# Patient Record
Sex: MALE | Race: BLACK OR AFRICAN AMERICAN | NOT HISPANIC OR LATINO | Employment: FULL TIME | URBAN - METROPOLITAN AREA
[De-identification: names, ages, dates, MRNs, and addresses within clinical notes are randomized per-mention and may not be internally consistent; named-entity substitution may affect disease eponyms.]

---

## 2017-03-06 DIAGNOSIS — J45.41 MODERATE PERSISTENT ASTHMA WITH EXACERBATION: ICD-10-CM

## 2017-03-06 RX ORDER — BUDESONIDE AND FORMOTEROL FUMARATE DIHYDRATE 160; 4.5 UG/1; UG/1
2 AEROSOL RESPIRATORY (INHALATION) 2 TIMES DAILY
Qty: 3 INHALER | Refills: 0 | Status: SHIPPED | OUTPATIENT
Start: 2017-03-06 | End: 2017-06-23

## 2017-03-06 NOTE — TELEPHONE ENCOUNTER
Prescription approved per [facility] Refill Protocol.  Ashley Vitale PharmD   [facility]  [email or web address]   Float pharmacist on behalf of [facility].

## 2017-03-14 DIAGNOSIS — E78.5 HYPERLIPIDEMIA LDL GOAL <100: ICD-10-CM

## 2017-03-14 DIAGNOSIS — I10 ESSENTIAL HYPERTENSION: ICD-10-CM

## 2017-03-14 DIAGNOSIS — E11.9 TYPE 2 DIABETES MELLITUS WITHOUT COMPLICATION (H): ICD-10-CM

## 2017-03-14 RX ORDER — PRAVASTATIN SODIUM 20 MG
20 TABLET ORAL DAILY
Qty: 90 TABLET | Refills: 1 | Status: SHIPPED | OUTPATIENT
Start: 2017-03-14 | End: 2017-06-23

## 2017-03-14 RX ORDER — NIFEDIPINE 30 MG/1
30 TABLET, EXTENDED RELEASE ORAL DAILY
Qty: 90 TABLET | Refills: 1 | Status: SHIPPED | OUTPATIENT
Start: 2017-03-14 | End: 2017-06-23

## 2017-04-18 DIAGNOSIS — I10 ESSENTIAL HYPERTENSION: ICD-10-CM

## 2017-04-18 NOTE — TELEPHONE ENCOUNTER
losartan (COZAAR) 100 MG tablet      Last Written Prescription Date: 8/24/16  Last Fill Quantity: 90, # refills: 3  Last Office Visit with G, P or [facility] prescribing provider: 10/26/16       Potassium   Date Value Ref Range Status   03/03/2016 4.1 3.4 - 5.3 mmol/L Final     Creatinine   Date Value Ref Range Status   03/03/2016 0.92 0.66 - 1.25 mg/dL Final     BP Readings from Last 3 Encounters:   10/26/16 125/87   02/24/16 122/86   01/07/16 (!) 147/99

## 2017-04-21 RX ORDER — LOSARTAN POTASSIUM 100 MG/1
TABLET ORAL
Qty: 90 TABLET | Refills: 1 | Status: SHIPPED | OUTPATIENT
Start: 2017-04-21 | End: 2017-06-23

## 2017-04-25 DIAGNOSIS — K21.9 GASTROESOPHAGEAL REFLUX DISEASE WITHOUT ESOPHAGITIS: ICD-10-CM

## 2017-04-26 NOTE — TELEPHONE ENCOUNTER
Omeprazole      Last Written Prescription Date: 2/24/2016  Last Fill Quantity: 90,  # refills: 3   Last Office Visit with G, [facility] or [facility] prescribing provider: 10/26/2016

## 2017-05-31 ENCOUNTER — TELEPHONE (OUTPATIENT)
Dept: FAMILY MEDICINE | Facility: CLINIC | Age: 47
End: 2017-05-31

## 2017-06-01 ASSESSMENT — ASTHMA QUESTIONNAIRES: ACT_TOTALSCORE: 15

## 2017-06-18 DIAGNOSIS — I10 ESSENTIAL HYPERTENSION: ICD-10-CM

## 2017-06-18 DIAGNOSIS — J45.40 MODERATE PERSISTENT ASTHMA WITHOUT COMPLICATION: ICD-10-CM

## 2017-06-18 RX ORDER — CHLORTHALIDONE 25 MG/1
TABLET ORAL
Qty: 90 TABLET | Refills: 3 | Status: CANCELLED | OUTPATIENT
Start: 2017-06-18

## 2017-06-18 RX ORDER — ALBUTEROL SULFATE 90 UG/1
AEROSOL, METERED RESPIRATORY (INHALATION)
Qty: 18 G | Refills: 0 | Status: CANCELLED | OUTPATIENT
Start: 2017-06-18

## 2017-06-19 NOTE — TELEPHONE ENCOUNTER
Ventolin       Last Written Prescription Date: 10/26/2016  Last Fill Quantity: 1 inhaler, # refills: 11    Last Office Visit with [facility], [facility] or  Health prescribing provider:  10/26/2016   Future Office Visit:    Next 5 appointments (look out 90 days)     Jun 27, 2017  8:40 AM CDT   [facility]man [facility]   [facility]    [address] [address]   [phone]                   Date of Last Asthma Action Plan Letter:   Asthma Action Plan Q1 Year    Topic Date Due     Asthma Action Plan - yearly  10/26/2017      Asthma Control Test:   ACT Total Scores 5/31/2017   ACT TOTAL SCORE (Goal Greater than or Equal to 20) 15   In the past 12 months, how many times did you visit the emergency room for your asthma without being admitted to the hospital? 0   In the past 12 months, how many times were you hospitalized overnight because of your asthma? 0       Date of Last Spirometry Test:   No results found for this or any previous visit.      Chlorthalidone      Last Written Prescription Date: 02/24/2016  Last Fill Quantity: 90, # refills: 3  Last Office Visit with [facility], [facility] or  Health prescribing provider: 10/26/2016  Next 5 appointments (look out 90 days)     Jun 27, 2017  8:40 AM CDT   SHORT with Deann Gilman NP   [facility]    [address] [address]   [phone]                   Potassium   Date Value Ref Range Status   03/03/2016 4.1 3.4 - 5.3 mmol/L Final     Creatinine   Date Value Ref Range Status   03/03/2016 0.92 0.66 - 1.25 mg/dL Final     BP Readings from Last 3 Encounters:   10/26/16 125/87   02/24/16 122/86   01/07/16 (!) 147/99       Klever CERNA (R)

## 2017-06-23 ENCOUNTER — OFFICE VISIT (OUTPATIENT)
Dept: FAMILY MEDICINE | Facility: CLINIC | Age: 47
End: 2017-06-23
Payer: COMMERCIAL

## 2017-06-23 VITALS
DIASTOLIC BLOOD PRESSURE: 92 MMHG | OXYGEN SATURATION: 96 % | WEIGHT: 229 LBS | HEART RATE: 103 BPM | BODY MASS INDEX: 28.47 KG/M2 | SYSTOLIC BLOOD PRESSURE: 133 MMHG | TEMPERATURE: 98.6 F | HEIGHT: 75 IN

## 2017-06-23 DIAGNOSIS — E78.5 HYPERLIPIDEMIA LDL GOAL <100: ICD-10-CM

## 2017-06-23 DIAGNOSIS — I10 BENIGN ESSENTIAL HYPERTENSION: ICD-10-CM

## 2017-06-23 DIAGNOSIS — J45.40 MODERATE PERSISTENT ASTHMA WITHOUT COMPLICATION: ICD-10-CM

## 2017-06-23 DIAGNOSIS — E11.9 TYPE 2 DIABETES MELLITUS WITHOUT COMPLICATION, WITHOUT LONG-TERM CURRENT USE OF INSULIN (H): Primary | ICD-10-CM

## 2017-06-23 PROCEDURE — 99214 OFFICE O/P EST MOD 30 MIN: CPT | Performed by: INTERNAL MEDICINE

## 2017-06-23 RX ORDER — NIFEDIPINE 30 MG/1
30 TABLET, EXTENDED RELEASE ORAL DAILY
Qty: 90 TABLET | Refills: 3 | Status: SHIPPED | OUTPATIENT
Start: 2017-06-23 | End: 2018-07-31

## 2017-06-23 RX ORDER — CHLORTHALIDONE 25 MG/1
25 TABLET ORAL DAILY
Qty: 90 TABLET | Refills: 3 | Status: SHIPPED | OUTPATIENT
Start: 2017-06-23 | End: 2017-09-28

## 2017-06-23 RX ORDER — LOSARTAN POTASSIUM 100 MG/1
100 TABLET ORAL DAILY
Qty: 90 TABLET | Refills: 3 | Status: SHIPPED | OUTPATIENT
Start: 2017-06-23 | End: 2018-07-31

## 2017-06-23 RX ORDER — BUDESONIDE AND FORMOTEROL FUMARATE DIHYDRATE 160; 4.5 UG/1; UG/1
2 AEROSOL RESPIRATORY (INHALATION) 2 TIMES DAILY
Qty: 1 INHALER | Refills: 11 | Status: SHIPPED | OUTPATIENT
Start: 2017-06-23 | End: 2018-07-21

## 2017-06-23 RX ORDER — PRAVASTATIN SODIUM 20 MG
20 TABLET ORAL DAILY
Qty: 90 TABLET | Refills: 3 | Status: SHIPPED | OUTPATIENT
Start: 2017-06-23 | End: 2018-07-31

## 2017-06-23 RX ORDER — ALBUTEROL SULFATE 90 UG/1
2 AEROSOL, METERED RESPIRATORY (INHALATION) EVERY 4 HOURS PRN
Qty: 1 INHALER | Refills: 11 | Status: SHIPPED | OUTPATIENT
Start: 2017-06-23 | End: 2018-01-14

## 2017-06-23 NOTE — NURSING NOTE
"Chief Complaint   Patient presents with     Asthma     Diabetes       Initial BP (!) 133/92 (BP Location: Right arm, Patient Position: Chair, Cuff Size: Adult Large)  Pulse 103  Temp 98.6  F (37  C) (Tympanic)  Ht 6' 3\" (1.905 m)  Wt 229 lb (103.9 kg)  SpO2 96%  BMI 28.62 kg/m2 Estimated body mass index is 28.62 kg/(m^2) as calculated from the following:    Height as of this encounter: 6' 3\" (1.905 m).    Weight as of this encounter: 229 lb (103.9 kg).  Medication Reconciliation: complete   Tiffany CAABLLERO CMA (AAMA)    "

## 2017-06-23 NOTE — MR AVS SNAPSHOT
After Visit Summary   6/23/2017    Juan Pablo Abraham    MRN: [id number]           Patient Information     Date Of Birth          1970        Visit Information        Provider Department      6/23/2017 7:00 AM John Langford MD [facility]        Today's Diagnoses     Type 2 diabetes mellitus without complication, without long-term current use of insulin (H)    -  1    Benign essential hypertension        Hyperlipidemia LDL goal <100        Essential hypertension        Moderate persistent asthma with exacerbation        Moderate persistent asthma without complication          Care Instructions    I would recommend getting an eye exam done soon- this should be done yearly when people have diabetes.            Follow-ups after your visit        Future tests that were ordered for you today     Open Future Orders        Priority Expected Expires Ordered    Basic metabolic panel Routine  7/24/2017 6/23/2017            Who to contact     If you have questions or need follow up information about today's clinic visit or your schedule please contact [facility] directly at [phone].  Normal or non-critical lab and imaging results will be communicated to you by MyChart, letter or phone within 4 business days after the clinic has received the results. If you do not hear from us within 7 days, please contact the clinic through [employer] or phone. If you have a critical or abnormal lab result, we will notify you by phone as soon as possible.  Submit refill requests through [employer] or call your pharmacy and they will forward the refill request to us. Please allow 3 business days for your refill to be completed.          Additional Information About Your Visit        MyChart Information     [employer] gives you secure access to your electronic health record. If you see a primary care provider, you can also send messages to your care team and make appointments. If you have questions, please  "call your primary care clinic.  If you do not have a primary care provider, please call [phone] and they will assist you.        Care EveryWhere ID     This is your Care EveryWhere ID. This could be used by other organizations to access your [facility] medical records  [id number]        Your Vitals Were     Pulse Temperature Height Pulse Oximetry BMI (Body Mass Index)       103 98.6  F (37  C) (Tympanic) 6' 3\" (1.905 m) 96% 28.62 kg/m2        Blood Pressure from Last 3 Encounters:   06/23/17 (!) 133/92   10/26/16 125/87   02/24/16 122/86    Weight from Last 3 Encounters:   06/23/17 229 lb (103.9 kg)   10/26/16 234 lb (106.1 kg)   02/24/16 217 lb (98.4 kg)              We Performed the Following     Albumin Random Urine Quantitative     Hemoglobin A1c          Today's Medication Changes          These changes are accurate as of: 6/23/17  7:26 AM.  If you have any questions, ask your nurse or doctor.               These medicines have changed or have updated prescriptions.        Dose/Directions    losartan 100 MG tablet   Commonly known as:  COZAAR   This may have changed:  See the new instructions.   Used for:  Essential hypertension   Changed by:  John Langford MD        Dose:  100 mg   Take 1 tablet (100 mg) by mouth daily   Quantity:  90 tablet   Refills:  3         Stop taking these medicines if you haven't already. Please contact your care team if you have questions.     predniSONE 20 MG tablet   Commonly known as:  DELTASONE   Stopped by:  John Langford MD                Where to get your medicines      These medications were sent to [facility] [address]  [address] [address]     Phone:  [phone]     albuterol 108 (90 BASE) MCG/ACT Inhaler    budesonide-formoterol 160-4.5 MCG/ACT Inhaler    chlorthalidone 25 MG tablet    losartan 100 MG tablet    NIFEdipine ER osmotic 30 MG 24 hr tablet    pravastatin 20 MG tablet                Primary Care " Provider Office Phone # Fax #    Deann Gilman, EBONY [phone]       [facility] [address] [address]        Equal Access to Services     PERLA GUTIERREZ : Hadii aad ku hadoliviero Soomaali, waaxda luqadaha, qaybta kaalmada adeegyada, gilbert sharpn samiranish monroeflo zambrano. So [facility] [phone].    ATENCIÓN: Si habla español, tiene a lees disposición servicios gratuitos de asistencia lingüística. Llame al [phone].    We comply with applicable federal civil rights laws and [place] laws. We do not discriminate on the basis of race, color, national origin, age, disability sex, sexual orientation or gender identity.            Thank you!     Thank you for choosing [facility]  for your care. Our goal is always to provide you with excellent care. Hearing back from our patients is one way we can continue to improve our services. Please take a few minutes to complete the written survey that you may receive in the mail after your visit with us. Thank you!             Your Updated Medication List - Protect others around you: Learn how to safely use, store and throw away your medicines at www.disposemymeds.org.          This list is accurate as of: 6/23/17  7:26 AM.  Always use your most recent med list.                   Brand Name Dispense Instructions for use Diagnosis    * albuterol (2.5 MG/3ML) 0.083% neb solution     30 vial    Take 1 vial (2.5 mg) by nebulization every 6 hours as needed for shortness of breath / dyspnea or wheezing    Moderate persistent asthma without complication       * albuterol 108 (90 BASE) MCG/ACT Inhaler    VENTOLIN HFA    1 Inhaler    Inhale 2 puffs into the lungs every 4 hours as needed ntbs    Moderate persistent asthma without complication       ASPIRIN NOT PRESCRIBED    INTENTIONAL    1 each    continuous prn for other Antiplatelet medication not prescribed intentionally due to Not indicated based on age        blood glucose  monitoring lancets     100 each    Use 1 daily    Type 2 diabetes, HbA1c goal < 7% (H)       blood glucose monitoring test strip    FREESTYLE LITE    100 strip    Use to test blood sugars 2 times daily or as directed.    Type 2 diabetes, HbA1c goal < 7% (H)       budesonide-formoterol 160-4.5 MCG/ACT Inhaler    SYMBICORT    1 Inhaler    Inhale 2 puffs into the lungs 2 times daily    Moderate persistent asthma with exacerbation       chlorthalidone 25 MG tablet    HYGROTON    90 tablet    Take 1 tablet (25 mg) by mouth daily    Essential hypertension       losartan 100 MG tablet    COZAAR    90 tablet    Take 1 tablet (100 mg) by mouth daily    Essential hypertension       NIFEdipine ER osmotic 30 MG 24 hr tablet    PROCARDIA XL    90 tablet    Take 1 tablet (30 mg) by mouth daily    Essential hypertension       omeprazole 20 MG CR capsule    priLOSEC    90 capsule    TAKE ONE CAPSULE BY MOUTH EVERY DAY    Gastroesophageal reflux disease without esophagitis       pravastatin 20 MG tablet    PRAVACHOL    90 tablet    Take 1 tablet (20 mg) by mouth daily    Hyperlipidemia LDL goal <100       STATIN NOT PRESCRIBED (INTENTIONAL)     0 each    Statin not prescribed intentionally due to Intolerance (with supporting documentation of trying a statin at least once within the last 5 years)    Statin intolerance       * Notice:  This list has 2 medication(s) that are the same as other medications prescribed for you. Read the directions carefully, and ask your doctor or other care provider to review them with you.

## 2017-06-23 NOTE — LETTER
[facility]  [address] [address]  [phone]        October 4, 2018    Juan Pablo Abraham  [address] [address]              Dear Juan Pablo Abraham    This is to remind you that your Urinalysis is due.    You may call our office at [phone] to schedule an appointment.    Please disregard this notice if you have already had your labs drawn or made an appointment.        Sincerely,        John Langford MD

## 2017-06-23 NOTE — PROGRESS NOTES
SUBJECTIVE:                                                    Juan Pablo Abraham is a 47 year old male who presents to clinic today for the following health issues:    Chief Complaint   Patient presents with     Asthma     Diabetes         Diabetes Follow-up      Patient is checking blood sugars: not at all    Diabetic concerns: None     Symptoms of hypoglycemia (low blood sugar): none     Paresthesias (numbness or burning in feet) or sores: No     Date of last diabetic eye exam: never had one     Diabetes is diet controlled.      Hyperlipidemia Follow-Up      Rate your low fat/cholesterol diet?: not monitoring fat    Taking statin?  Yes, no muscle aches from statin    Other lipid medications/supplements?:  none     Hypertension Follow-up      Outpatient blood pressures are being checked at work.  Results are 130s/90s.    Low Salt Diet: not monitoring salt       Asthma Follow-Up    Was ACT completed today?    Yes    ACT Total Scores 6/23/2017   ACT TOTAL SCORE (Goal Greater than or Equal to 20) 19   In the past 12 months, how many times did you visit the emergency room for your asthma without being admitted to the hospital? 0   In the past 12 months, how many times were you hospitalized overnight because of your asthma? 0       Recent asthma triggers that patient is dealing with: None        Amount of exercise or physical activity: 2-3 days/week for an average of 30-45 minutes    Problems taking medications regularly: No    Medication side effects: none    Diet: regular (no restrictions) and tries to watch carbohydrates and sugar    Juan Pablo states that his asthma is at its baseline.  Triggers are cold weather, exercise, and dust.  He does not believe he has any problems with seasonal allergies, and does not routinely take antihistamine.  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   Occasional coughing and wheezing.        Problem list and histories reviewed & adjusted, as indicated.  Additional history: as  documented    Current Outpatient Prescriptions   Medication Sig Dispense Refill     pravastatin (PRAVACHOL) 20 MG tablet Take 1 tablet (20 mg) by mouth daily 90 tablet 3     chlorthalidone (HYGROTON) 25 MG tablet Take 1 tablet (25 mg) by mouth daily 90 tablet 3     budesonide-formoterol (SYMBICORT) 160-4.5 MCG/ACT Inhaler Inhale 2 puffs into the lungs 2 times daily 1 Inhaler 11     albuterol (VENTOLIN HFA) 108 (90 BASE) MCG/ACT Inhaler Inhale 2 puffs into the lungs every 4 hours as needed ntbs 1 Inhaler 11     NIFEdipine ER osmotic (PROCARDIA XL) 30 MG 24 hr tablet Take 1 tablet (30 mg) by mouth daily 90 tablet 3     losartan (COZAAR) 100 MG tablet Take 1 tablet (100 mg) by mouth daily 90 tablet 3     omeprazole (PRILOSEC) 20 MG CR capsule TAKE ONE CAPSULE BY MOUTH EVERY DAY 90 capsule 1     [DISCONTINUED] losartan (COZAAR) 100 MG tablet TAKE ONE TABLET BY MOUTH EVERY DAY 90 tablet 1     [DISCONTINUED] NIFEdipine ER osmotic (PROCARDIA XL) 30 MG 24 hr tablet Take 1 tablet (30 mg) by mouth daily 90 tablet 1     [DISCONTINUED] pravastatin (PRAVACHOL) 20 MG tablet Take 1 tablet (20 mg) by mouth daily 90 tablet 1     [DISCONTINUED] budesonide-formoterol (SYMBICORT) 160-4.5 MCG/ACT Inhaler Inhale 2 puffs into the lungs 2 times daily 3 Inhaler 0     albuterol (2.5 MG/3ML) 0.083% nebulizer solution Take 1 vial (2.5 mg) by nebulization every 6 hours as needed for shortness of breath / dyspnea or wheezing 30 vial 2     [DISCONTINUED] albuterol (VENTOLIN HFA) 108 (90 BASE) MCG/ACT inhaler Inhale 2 puffs into the lungs every 4 hours as needed ntbs 1 Inhaler 11     [DISCONTINUED] chlorthalidone (HYGROTON) 25 MG tablet Take 1 tablet (25 mg) by mouth daily 90 tablet 3     STATIN NOT PRESCRIBED, INTENTIONAL, Statin not prescribed intentionally due to Intolerance (with supporting documentation of trying a statin at least once within the last 5 years) 0 each 0     ASPIRIN NOT PRESCRIBED, INTENTIONAL, continuous prn for other  "Antiplatelet medication not prescribed intentionally due to Not indicated based on age 1 each 0     glucose blood VI test strips (FREESTYLE LITE) strip Use to test blood sugars 2 times daily or as directed. 100 strip 12     FREESTYLE LANCETS MISC Use 1 daily 100 each 12     No Known Allergies    Reviewed and updated as needed this visit by clinical staff  Tobacco  Allergies  Med Hx  Surg Hx  Fam Hx  Soc Hx      Reviewed and updated as needed this visit by Provider         ROS:  Constitutional, pulmonary systems are negative, except as otherwise noted.    OBJECTIVE:                                                    BP (!) 133/92 (BP Location: Right arm, Patient Position: Chair, Cuff Size: Adult Large)  Pulse 103  Temp 98.6  F (37  C) (Tympanic)  Ht 6' 3\" (1.905 m)  Wt 229 lb (103.9 kg)  SpO2 96%  BMI 28.62 kg/m2  Body mass index is 28.62 kg/(m^2).  GENERAL: healthy, alert and no distress  RESP: lungs clear to auscultation - no rales, rhonchi or wheezes  CV: regular rate and rhythm, normal S1 S2, no S3 or S4, no murmur, click or rub, no peripheral edema   Diabetic foot exam: normal DP and PT pulses, no trophic changes or ulcerative lesions and normal monofilament exam       ASSESSMENT/PLAN:                                                        1. Type 2 diabetes mellitus without complication, without long-term current use of insulin (H)    Diet controlled.  Due for A1C and microalbumin- ordered.  Reminded him to get annual eye exam.  Normal foot exam today.      - **A1C FUTURE anytime; Future  - **Albumin Random Urine Quant FUTURE anytime; Future    2. Benign essential hypertension    DBP slightly high, but SBP good, so will continue current meds.  Due for BMP- ordered.     - Basic metabolic panel; Future  - chlorthalidone (HYGROTON) 25 MG tablet; Take 1 tablet (25 mg) by mouth daily  Dispense: 90 tablet; Refill: 3  - NIFEdipine ER osmotic (PROCARDIA XL) 30 MG 24 hr tablet; Take 1 tablet (30 mg) by mouth " daily  Dispense: 90 tablet; Refill: 3  - losartan (COZAAR) 100 MG tablet; Take 1 tablet (100 mg) by mouth daily  Dispense: 90 tablet; Refill: 3    3. Hyperlipidemia LDL goal <100    Lipids checked last November, continue pravastatin.     - pravastatin (PRAVACHOL) 20 MG tablet; Take 1 tablet (20 mg) by mouth daily  Dispense: 90 tablet; Refill: 3    6. Moderate persistent asthma without complication    ACT score is 19, but he reports he feels at his baseline and is overall satisfied with the current control, so will not change meds.  Discussed getting updated PFTs since it has been awhile, but he says he knows it will show obstruction so he's not really interested in updating this at the current time.  Print out of asthma action plan provided.     - budesonide-formoterol (SYMBICORT) 160-4.5 MCG/ACT Inhaler; Inhale 2 puffs into the lungs 2 times daily  Dispense: 1 Inhaler; Refill: 11  - albuterol (VENTOLIN HFA) 108 (90 BASE) MCG/ACT Inhaler; Inhale 2 puffs into the lungs every 4 hours as needed ntbs  Dispense: 1 Inhaler; Refill: 11      John Langford MD  [facility]

## 2017-06-23 NOTE — PATIENT INSTRUCTIONS
I would recommend getting an eye exam done soon- this should be done yearly when people have diabetes.

## 2017-06-24 DIAGNOSIS — E11.9 TYPE 2 DIABETES MELLITUS WITHOUT COMPLICATION, WITHOUT LONG-TERM CURRENT USE OF INSULIN (H): ICD-10-CM

## 2017-06-24 DIAGNOSIS — I10 BENIGN ESSENTIAL HYPERTENSION: ICD-10-CM

## 2017-06-24 LAB
ANION GAP SERPL CALCULATED.3IONS-SCNC: 7 MMOL/L (ref 3–14)
BUN SERPL-MCNC: 16 MG/DL (ref 7–30)
CALCIUM SERPL-MCNC: 10 MG/DL (ref 8.5–10.1)
CHLORIDE SERPL-SCNC: 107 MMOL/L (ref 94–109)
CO2 SERPL-SCNC: 25 MMOL/L (ref 20–32)
CREAT SERPL-MCNC: 0.9 MG/DL (ref 0.66–1.25)
GFR SERPL CREATININE-BSD FRML MDRD: ABNORMAL ML/MIN/1.7M2
GLUCOSE SERPL-MCNC: 107 MG/DL (ref 70–99)
HBA1C MFR BLD: 6 % (ref 4.3–6)
POTASSIUM SERPL-SCNC: 3.4 MMOL/L (ref 3.4–5.3)
SODIUM SERPL-SCNC: 139 MMOL/L (ref 133–144)

## 2017-06-24 PROCEDURE — 36415 COLL VENOUS BLD VENIPUNCTURE: CPT | Performed by: INTERNAL MEDICINE

## 2017-06-24 PROCEDURE — 80048 BASIC METABOLIC PNL TOTAL CA: CPT | Performed by: INTERNAL MEDICINE

## 2017-06-24 PROCEDURE — 83036 HEMOGLOBIN GLYCOSYLATED A1C: CPT | Performed by: INTERNAL MEDICINE

## 2017-06-24 ASSESSMENT — ASTHMA QUESTIONNAIRES: ACT_TOTALSCORE: 19

## 2017-08-09 ENCOUNTER — TELEPHONE (OUTPATIENT)
Dept: FAMILY MEDICINE | Facility: CLINIC | Age: 47
End: 2017-08-09

## 2017-08-09 NOTE — TELEPHONE ENCOUNTER
Reviewing chart and noticed your blood pressure was above goal  BP Readings from Last 3 Encounters:   06/23/17 (!) 133/92   10/26/16 125/87   02/24/16 122/86     Can you come and have the RN check blood pressure some time over the next 1-2 weeks.    Ask patient is he is still smoking as well.  If so find out if he is interested in any resources or medications to help him quit smoking.  We also have MTM to help with this as well.  Place referral if patient interested.    Call patient with above.  Update smoking history if patient no longer smoking.    DENISSE Kerr

## 2017-08-09 NOTE — LETTER
September 5, 2017      Juan Pablo Abraham  [address] [address]        Dear Juan Pablo,     We have been unable to reach you by phone.     Your provider was reviewing your chart and noticed your blood pressure was above goal.  Could you please come into the clinic for a no charge nurse visit for a recheck of your blood pressure.  You can call the clinic at [phone] to schedule this.    Thank you for trusting us with your health care.      Sincerely,        Your [facility] Team/lw

## 2017-09-05 NOTE — TELEPHONE ENCOUNTER
Panel Management Review        Composite cancer screening  Chart review shows that this patient is due/due soon for the following None  Summary:    Patient is due/failing the following:   BP CHECK    Action needed:   Patient needs nurse only appointment.    Type of outreach:    No response to phone calls, letter with recommendations mailed to patient.    Questions for provider review:    None                                                                                                                                    FRIDA Faria, KIMBERLY

## 2017-09-28 ENCOUNTER — OFFICE VISIT (OUTPATIENT)
Dept: FAMILY MEDICINE | Facility: CLINIC | Age: 47
End: 2017-09-28
Payer: COMMERCIAL

## 2017-09-28 VITALS
DIASTOLIC BLOOD PRESSURE: 81 MMHG | BODY MASS INDEX: 27.48 KG/M2 | HEIGHT: 75 IN | WEIGHT: 221 LBS | HEART RATE: 78 BPM | TEMPERATURE: 98.5 F | SYSTOLIC BLOOD PRESSURE: 123 MMHG

## 2017-09-28 DIAGNOSIS — K21.9 GASTROESOPHAGEAL REFLUX DISEASE WITHOUT ESOPHAGITIS: ICD-10-CM

## 2017-09-28 DIAGNOSIS — I10 ESSENTIAL HYPERTENSION: ICD-10-CM

## 2017-09-28 DIAGNOSIS — J45.40 MODERATE PERSISTENT ASTHMA WITHOUT COMPLICATION: ICD-10-CM

## 2017-09-28 DIAGNOSIS — E78.5 HYPERLIPIDEMIA LDL GOAL <100: Primary | ICD-10-CM

## 2017-09-28 DIAGNOSIS — E11.9 TYPE 2 DIABETES MELLITUS WITHOUT COMPLICATION, WITHOUT LONG-TERM CURRENT USE OF INSULIN (H): ICD-10-CM

## 2017-09-28 PROCEDURE — 99214 OFFICE O/P EST MOD 30 MIN: CPT | Performed by: NURSE PRACTITIONER

## 2017-09-28 RX ORDER — ALBUTEROL SULFATE 0.83 MG/ML
1 SOLUTION RESPIRATORY (INHALATION) EVERY 6 HOURS PRN
Qty: 30 VIAL | Refills: 2 | Status: SHIPPED | OUTPATIENT
Start: 2017-09-28 | End: 2019-03-20

## 2017-09-28 NOTE — PATIENT INSTRUCTIONS
Return to do A1C and Lipids.    Follow up with me in 3-6 months or sooner if problems arise.    DENISSE Kerr

## 2017-09-28 NOTE — MR AVS SNAPSHOT
After Visit Summary   9/28/2017    Juan Pablo Abraham    MRN: [id number]           Patient Information     Date Of Birth          1970        Visit Information        Provider Department      9/28/2017 8:20 AM Deann Gilman NP [facility]        Today's Diagnoses     Hyperlipidemia LDL goal <100    -  1    Essential hypertension        Moderate persistent asthma without complication        Gastroesophageal reflux disease without esophagitis        Type 2 diabetes mellitus without complication, without long-term current use of insulin (H)          Care Instructions    Return to do A1C and Lipids.    Follow up with me in 3-6 months or sooner if problems arise.    DENISSE Kerr            Follow-ups after your visit        Future tests that were ordered for you today     Open Future Orders        Priority Expected Expires Ordered    Hemoglobin A1c Routine  9/28/2018 9/28/2017    Lipid panel reflex to direct LDL Routine  9/28/2018 9/28/2017            Who to contact     If you have questions or need follow up information about today's clinic visit or your schedule please contact [facility] directly at [phone].  Normal or non-critical lab and imaging results will be communicated to you by Cocodothart, letter or phone within 4 business days after the clinic has received the results. If you do not hear from us within 7 days, please contact the clinic through Style on Screent or phone. If you have a critical or abnormal lab result, we will notify you by phone as soon as possible.  Submit refill requests through [employer] or call your pharmacy and they will forward the refill request to us. Please allow 3 business days for your refill to be completed.          Additional Information About Your Visit        Cocodothart Information     [employer] gives you secure access to your electronic health record. If you see a primary care provider, you can also send messages to your care team and  "make appointments. If you have questions, please call your primary care clinic.  If you do not have a primary care provider, please call [phone] and they will assist you.        Care EveryWhere ID     This is your Care EveryWhere ID. This could be used by other organizations to access your [place] medical records  [id number]        Your Vitals Were     Pulse Temperature Height BMI (Body Mass Index)          78 98.5  F (36.9  C) (Tympanic) 6' 3\" (1.905 m) 27.62 kg/m2         Blood Pressure from Last 3 Encounters:   09/28/17 123/81   06/23/17 (!) 133/92   10/26/16 125/87    Weight from Last 3 Encounters:   09/28/17 221 lb (100.2 kg)   06/23/17 229 lb (103.9 kg)   10/26/16 234 lb (106.1 kg)                 Today's Medication Changes          These changes are accurate as of: 9/28/17  9:12 AM.  If you have any questions, ask your nurse or doctor.               These medicines have changed or have updated prescriptions.        Dose/Directions    omeprazole 20 MG CR capsule   Commonly known as:  priLOSEC   This may have changed:  See the new instructions.   Used for:  Gastroesophageal reflux disease without esophagitis   Changed by:  Deann Gilman NP        Dose:  20 mg   Take 1 capsule (20 mg) by mouth daily   Quantity:  90 capsule   Refills:  1            Where to get your medicines      These medications were sent to [facility] [address] [address]     Phone:  [phone]     albuterol (2.5 MG/3ML) 0.083% neb solution    omeprazole 20 MG CR capsule                Primary Care Provider Office Phone # Fax #    Deann Gilman [phone] [address] [facility] [address]        Equal Access to Services     PERLA GUTIERREZ : Charito Mao, walily luqermias, qaybta kaalmakecia gonzales, gilbert zambrano. So [facility] [phone].    ATENCIÓN: Si adolfo pinto, " tiene a lees disposición servicios gratuitos de asistencia lingüística. Veronica mcdonald [phone].    We comply with applicable federal civil rights laws and [place] laws. We do not discriminate on the basis of race, color, national origin, age, disability sex, sexual orientation or gender identity.            Thank you!     Thank you for choosing [facility]  for your care. Our goal is always to provide you with excellent care. Hearing back from our patients is one way we can continue to improve our services. Please take a few minutes to complete the written survey that you may receive in the mail after your visit with us. Thank you!             Your Updated Medication List - Protect others around you: Learn how to safely use, store and throw away your medicines at www.disposemymeds.org.          This list is accurate as of: 9/28/17  9:12 AM.  Always use your most recent med list.                   Brand Name Dispense Instructions for use Diagnosis    * albuterol 108 (90 BASE) MCG/ACT Inhaler    VENTOLIN HFA    1 Inhaler    Inhale 2 puffs into the lungs every 4 hours as needed ntbs    Moderate persistent asthma without complication       * albuterol (2.5 MG/3ML) 0.083% neb solution     30 vial    Take 1 vial (2.5 mg) by nebulization every 6 hours as needed for shortness of breath / dyspnea or wheezing    Moderate persistent asthma without complication       ASPIRIN NOT PRESCRIBED    INTENTIONAL    1 each    continuous prn for other Antiplatelet medication not prescribed intentionally due to Not indicated based on age        blood glucose monitoring lancets     100 each    Use 1 daily    Type 2 diabetes, HbA1c goal < 7% (H)       blood glucose monitoring test strip    FREESTYLE LITE    100 strip    Use to test blood sugars 2 times daily or as directed.    Type 2 diabetes, HbA1c goal < 7% (H)       budesonide-formoterol 160-4.5 MCG/ACT Inhaler    SYMBICORT    1 Inhaler    Inhale 2 puffs into the lungs 2  times daily        losartan 100 MG tablet    COZAAR    90 tablet    Take 1 tablet (100 mg) by mouth daily        NIFEdipine ER osmotic 30 MG 24 hr tablet    PROCARDIA XL    90 tablet    Take 1 tablet (30 mg) by mouth daily        omeprazole 20 MG CR capsule    priLOSEC    90 capsule    Take 1 capsule (20 mg) by mouth daily    Gastroesophageal reflux disease without esophagitis       pravastatin 20 MG tablet    PRAVACHOL    90 tablet    Take 1 tablet (20 mg) by mouth daily    Hyperlipidemia LDL goal <100       * Notice:  This list has 2 medication(s) that are the same as other medications prescribed for you. Read the directions carefully, and ask your doctor or other care provider to review them with you.

## 2017-09-28 NOTE — PROGRESS NOTES
SUBJECTIVE:   Juan Pablo Abraham is a 47 year old male who presents to clinic today for the following health issues:    Diabetes Follow-up      Patient is checking blood sugars: not at all, he has not been checking lately.     Diabetic concerns: None     Symptoms of hypoglycemia (low blood sugar): none     Paresthesias (numbness or burning in feet) or sores: No     Date of last diabetic eye exam: None     Hyperlipidemia Follow-Up      Rate your low fat/cholesterol diet?: good    Taking statin?  Yes, no muscle aches from statin    Other lipid medications/supplements?:  none    Hypertension Follow-up      Outpatient blood pressures are being checked at work.  Results are bettter.    Low Salt Diet: no added salt    Stopped Chlorthalidone due to erectile dysfunction issues.    Since stopping this and stopping alcohol use and increasing exercise and diet changes he has not had these issues since    Blood pressure has been well controlled.    BP Readings from Last 3 Encounters:   09/28/17 123/81   06/23/17 (!) 133/92   10/26/16 125/87     Body mass index is 27.62 kg/(m^2).  Wt Readings from Last 2 Encounters:   09/28/17 221 lb (100.2 kg)   06/23/17 229 lb (103.9 kg)         Asthma Follow-Up    Was ACT completed today?    Yes    ACT Total Scores 6/23/2017   ACT TOTAL SCORE -   ASTHMA ER VISITS -   ASTHMA HOSPITALIZATIONS -   ACT TOTAL SCORE (Goal Greater than or Equal to 20) 19   In the past 12 months, how many times did you visit the emergency room for your asthma without being admitted to the hospital? 0   In the past 12 months, how many times were you hospitalized overnight because of your asthma? 0       Recent asthma triggers that patient is dealing with: upper respiratory infections and exercise or sports              Amount of exercise or physical activity: 2-3 days/week for an average of 15-30 minutes    Problems taking medications regularly: No    Medication side effects: none      Diet: regular (no  restrictions)      Problem list and histories reviewed & adjusted, as indicated.  Additional history: as documented    Patient Active Problem List   Diagnosis     Hyperlipidemia LDL goal <100     Type 2 diabetes mellitus without complication (H)     Essential hypertension     Moderate persistent asthma without complication     Gastroesophageal reflux disease without esophagitis     History reviewed. No pertinent surgical history.    Social History   Substance Use Topics     Smoking status: Former Smoker     Types: Cigarettes, Dip, chew, snus or snuff     Smokeless tobacco: Former User     Types: Chew     Quit date: 2007      Comment: smoked cigarettes but not a regular basis a long time ago now, occ chew-4 cans a month     Alcohol use 0.0 oz/week     0 Standard drinks or equivalent per week      Comment: 1 case a week     Family History   Problem Relation Age of Onset     Hypertension Father      CEREBROVASCULAR DISEASE Father       at 56, CVA in his 40s     CANCER Father      Circulatory Maternal Grandmother      CHF     Hypertension Maternal Grandmother      Alcohol/Drug Maternal Grandfather      Unknown/Adopted Paternal Grandmother      Unknown/Adopted Paternal Grandfather      Hypertension Mother      DIABETES No family hx of          Current Outpatient Prescriptions   Medication Sig Dispense Refill     albuterol (2.5 MG/3ML) 0.083% neb solution Take 1 vial (2.5 mg) by nebulization every 6 hours as needed for shortness of breath / dyspnea or wheezing 30 vial 2     omeprazole (PRILOSEC) 20 MG CR capsule Take 1 capsule (20 mg) by mouth daily 90 capsule 1     pravastatin (PRAVACHOL) 20 MG tablet Take 1 tablet (20 mg) by mouth daily 90 tablet 3     budesonide-formoterol (SYMBICORT) 160-4.5 MCG/ACT Inhaler Inhale 2 puffs into the lungs 2 times daily 1 Inhaler 11     albuterol (VENTOLIN HFA) 108 (90 BASE) MCG/ACT Inhaler Inhale 2 puffs into the lungs every 4 hours as needed ntbs 1 Inhaler 11     NIFEdipine ER  "osmotic (PROCARDIA XL) 30 MG 24 hr tablet Take 1 tablet (30 mg) by mouth daily 90 tablet 3     losartan (COZAAR) 100 MG tablet Take 1 tablet (100 mg) by mouth daily 90 tablet 3     ASPIRIN NOT PRESCRIBED, INTENTIONAL, continuous prn for other Antiplatelet medication not prescribed intentionally due to Not indicated based on age 1 each 0     glucose blood VI test strips (FREESTYLE LITE) strip Use to test blood sugars 2 times daily or as directed. 100 strip 12     FREESTYLE LANCETS MISC Use 1 daily 100 each 12     No Known Allergies  Recent Labs   Lab Test  06/24/17   0725  11/11/16   0655  03/03/16   0603  05/09/15   0720  06/27/14   0644   A1C  6.0  5.8  6.0  6.0  5.8   LDL   --   131*  112*  158*  151*   --    HDL   --   50  60  43   --    TRIG   --   73  70  76   --    CR  0.90   --   0.92  0.95  1.03   GFRESTIMATED  >90  Non  GFR Calc     --   89  86  78   GFRESTBLACK  >90   GFR Calc     --   >90   GFR Calc    >90   GFR Calc    >90   POTASSIUM  3.4   --   4.1  3.5  3.7   TSH   --   1.60   --    --   2.05      BP Readings from Last 3 Encounters:   09/28/17 123/81   06/23/17 (!) 133/92   10/26/16 125/87    Wt Readings from Last 3 Encounters:   09/28/17 221 lb (100.2 kg)   06/23/17 229 lb (103.9 kg)   10/26/16 234 lb (106.1 kg)                  Labs reviewed in EPIC          Reviewed and updated as needed this visit by clinical staff     Reviewed and updated as needed this visit by Provider         ROS:  Constitutional, HEENT, cardiovascular, pulmonary, GI, , musculoskeletal, neuro, skin, endocrine and psych systems are negative, except as otherwise noted.      OBJECTIVE:   /81  Pulse 78  Temp 98.5  F (36.9  C) (Tympanic)  Ht 6' 3\" (1.905 m)  Wt 221 lb (100.2 kg)  BMI 27.62 kg/m2  Body mass index is 27.62 kg/(m^2).  GENERAL: healthy, alert and no distress  NECK: no adenopathy, no asymmetry, masses, or scars and thyroid normal to " palpation  RESP: lungs clear to auscultation - no rales, rhonchi or wheezes  CV: regular rate and rhythm, normal S1 S2, no S3 or S4, no murmur, click or rub, no peripheral edema and peripheral pulses strong  ABDOMEN: soft, nontender, no hepatosplenomegaly, no masses and bowel sounds normal  MS: no gross musculoskeletal defects noted, no edema  PSYCH: mentation appears normal, affect normal/bright    Diagnostic Test Results:  Results for orders placed or performed in visit on 06/24/17   Basic metabolic panel   Result Value Ref Range    Sodium 139 133 - 144 mmol/L    Potassium 3.4 3.4 - 5.3 mmol/L    Chloride 107 94 - 109 mmol/L    Carbon Dioxide 25 20 - 32 mmol/L    Anion Gap 7 3 - 14 mmol/L    Glucose 107 (H) 70 - 99 mg/dL    Urea Nitrogen 16 7 - 30 mg/dL    Creatinine 0.90 0.66 - 1.25 mg/dL    GFR Estimate >90  Non  GFR Calc   >60 mL/min/1.7m2    GFR Estimate If Black >90   GFR Calc   >60 mL/min/1.7m2    Calcium 10.0 8.5 - 10.1 mg/dL   **A1C FUTURE anytime   Result Value Ref Range    Hemoglobin A1C 6.0 4.3 - 6.0 %       ASSESSMENT/PLAN:     1. Essential hypertension  Controlled despite stopping Chlorthalidone.  Continue other blood pressure medications.  No need to restart this.  Suspect blood pressure reductions due to weight loss, diet changes, and stopping alcohol, and adding in exercise.  Encouraged continuing these lifestyle changes.    2. Moderate persistent asthma without complication   Controlled.  Reviewed ACT.  Doing well.    - albuterol (2.5 MG/3ML) 0.083% neb solution; Take 1 vial (2.5 mg) by nebulization every 6 hours as needed for shortness of breath / dyspnea or wheezing  Dispense: 30 vial; Refill: 2    3. Hyperlipidemia LDL goal <100   Recheck this given weight loss - suspect this will have improved.  Goal to have LDL < 70 for maximum CV benefit given race, age, sex and DIABETES history.    - Lipid panel reflex to direct LDL; Future    4. Gastroesophageal reflux  disease without esophagitis     - omeprazole (PRILOSEC) 20 MG CR capsule; Take 1 capsule (20 mg) by mouth daily  Dispense: 90 capsule; Refill: 1    5. Type 2 diabetes mellitus without complication, without long-term current use of insulin (H)   Controlled - A1C at goal.    - Hemoglobin A1c; Future    Patient Instructions   Return to do A1C and Lipids.    Follow up with me in 3-6 months or sooner if problems arise.    DENISSE Kerr NP  [facility]

## 2017-09-28 NOTE — NURSING NOTE
"Initial /81  Pulse 78  Temp 98.5  F (36.9  C) (Tympanic)  Ht 6' 3\" (1.905 m)  Wt 221 lb (100.2 kg)  BMI 27.62 kg/m2 Estimated body mass index is 27.62 kg/(m^2) as calculated from the following:    Height as of this encounter: 6' 3\" (1.905 m).    Weight as of this encounter: 221 lb (100.2 kg). .    Batool Kta CMA ([facility])  "

## 2017-09-29 ASSESSMENT — ASTHMA QUESTIONNAIRES: ACT_TOTALSCORE: 21

## 2018-01-14 DIAGNOSIS — J45.40 MODERATE PERSISTENT ASTHMA WITHOUT COMPLICATION: ICD-10-CM

## 2018-01-15 NOTE — TELEPHONE ENCOUNTER
"Requested Prescriptions   Pending Prescriptions Disp Refills     VENTOLIN  (90 BASE) MCG/ACT Inhaler [Pharmacy Med Name: VENTOLIN HFA 108MCG/ACT AERS]  Last Written Prescription Date:  06/23/2017  Last Fill Quantity: 1,  # refills: 11   Last Office Visit with G, P or [facility] prescribing provider:  09/28/2017   Future Office Visit:      18 g 5     Sig: INHALE TWO PUFFS INTO THE LUNGS EVERY 4 HOURS AS NEEDED    Asthma Maintenance Inhalers - Anticholinergics Passed    1/14/2018  7:57 PM       Passed - Patient is age 12 years or older       Passed - Asthma control test score is 20 or greater in last 6 months    Please review ACT score.          Passed - Recent (6 mo) or future visit with authorizing provider's specialty    Patient had office visit in the last 6 months or has a visit in the next 30 days with authorizing provider.  See \"Patient Info\" tab in inbasket, or \"Choose Columns\" in Meds & Orders section of the refill encounter.           Klever Brown RT (R)    "

## 2018-01-16 RX ORDER — ALBUTEROL SULFATE 90 UG/1
AEROSOL, METERED RESPIRATORY (INHALATION)
Qty: 18 G | Refills: 5 | Status: SHIPPED | OUTPATIENT
Start: 2018-01-16 | End: 2018-05-19

## 2018-05-19 ENCOUNTER — TELEPHONE (OUTPATIENT)
Dept: FAMILY MEDICINE | Facility: CLINIC | Age: 48
End: 2018-05-19

## 2018-05-19 DIAGNOSIS — J45.40 MODERATE PERSISTENT ASTHMA WITHOUT COMPLICATION: ICD-10-CM

## 2018-05-21 RX ORDER — ALBUTEROL SULFATE 90 UG/1
AEROSOL, METERED RESPIRATORY (INHALATION)
Qty: 18 G | Refills: 5 | Status: SHIPPED | OUTPATIENT
Start: 2018-05-21 | End: 2018-07-31

## 2018-05-21 NOTE — TELEPHONE ENCOUNTER
"Requested Prescriptions   Pending Prescriptions Disp Refills     VENTOLIN  (90 Base) MCG/ACT Inhaler [Pharmacy Med Name: VENTOLIN HFA 108MCG/ACT AERS]  Last Written Prescription Date:  01/16/18  Last Fill Quantity: 18g,  # refills: 5   Last office visit: 9/28/2017 with prescribing provider:  09/28/17   Future Office Visit:     18 g 5     Sig: INHALE TWO PUFFS INTO THE LUNGS EVERY 4 HOURS AS NEEDED    Asthma Maintenance Inhalers - Anticholinergics Failed    5/19/2018  5:27 PM       Failed - Asthma control assessment score within normal limits in last 6 months    Please review ACT score.   ACT Total Scores 5/31/2017 6/23/2017 9/28/2017   ACT TOTAL SCORE - - -   ASTHMA ER VISITS - - -   ASTHMA HOSPITALIZATIONS - - -   ACT TOTAL SCORE (Goal Greater than or Equal to 20) 15 19 21   In the past 12 months, how many times did you visit the emergency room for your asthma without being admitted to the hospital? 0 0 0   In the past 12 months, how many times were you hospitalized overnight because of your asthma? 0 0 0          Failed - Recent (6 mo) or future (30 days) visit within the authorizing provider's specialty    Patient had office visit in the last 6 months or has a visit in the next 30 days with authorizing provider or within the authorizing provider's specialty.  See \"Patient Info\" tab in inbasket, or \"Choose Columns\" in Meds & Orders section of the refill encounter.           Passed - Patient is age 12 years or older          "

## 2018-05-21 NOTE — TELEPHONE ENCOUNTER
Routing refill request to provider for review/approval because:  Last OV and ACT score was > 6 months ago non 9/28/17    ACT Total Scores 5/31/2017 6/23/2017 9/28/2017   ACT TOTAL SCORE - - -   ASTHMA ER VISITS - - -   ASTHMA HOSPITALIZATIONS - - -   ACT TOTAL SCORE (Goal Greater than or Equal to 20) 15 19 21   In the past 12 months, how many times did you visit the emergency room for your asthma without being admitted to the hospital? 0 0 0   In the past 12 months, how many times were you hospitalized overnight because of your asthma? 0 0 0     Cecy SOTOMAYOR RN

## 2018-05-24 NOTE — TELEPHONE ENCOUNTER
Patient is contacted and he has the ACT in hand and will turn it in when he comes back to work.  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 Maria T QUINTEROS RN

## 2018-05-26 ENCOUNTER — TELEPHONE (OUTPATIENT)
Dept: FAMILY MEDICINE | Facility: CLINIC | Age: 48
End: 2018-05-26

## 2018-05-27 ASSESSMENT — ASTHMA QUESTIONNAIRES: ACT_TOTALSCORE: 21

## 2018-07-21 DIAGNOSIS — J45.40 MODERATE PERSISTENT ASTHMA WITHOUT COMPLICATION: Primary | ICD-10-CM

## 2018-07-23 NOTE — TELEPHONE ENCOUNTER
"Requested Prescriptions   Pending Prescriptions Disp Refills     SYMBICORT 160-4.5 MCG/ACT Inhaler [Pharmacy Med Name: SYMBICORT 160-4.5MCG/ACT AERO]  Last Written Prescription Date:  6/23/2017  Last Fill Quantity: 1 inhaler,  # refills: 11   Last office visit: 9/28/2017 with prescribing provider:  Kalina   Future Office Visit:     10.2 g 1     Sig: INHALE 2 PUFFS BY MOUTH INTO THE LUNGS TWO TIMES A DAY    Inhaled Steroids Protocol Failed    7/21/2018  7:15 PM       Failed - Recent (6 mo) or future (30 days) visit within the authorizing provider's specialty    Patient had office visit in the last 6 months or has a visit in the next 30 days with authorizing provider or within the authorizing provider's specialty.  See \"Patient Info\" tab in inbasket, or \"Choose Columns\" in Meds & Orders section of the refill encounter.           Passed - Patient is age 12 or older       Passed - Asthma control assessment score within normal limits in last 6 months    Please review ACT score.             "

## 2018-07-24 RX ORDER — BUDESONIDE AND FORMOTEROL FUMARATE DIHYDRATE 160; 4.5 UG/1; UG/1
AEROSOL RESPIRATORY (INHALATION)
Qty: 10.2 G | Refills: 0 | Status: SHIPPED | OUTPATIENT
Start: 2018-07-24 | End: 2018-07-31

## 2018-07-24 NOTE — TELEPHONE ENCOUNTER
Due for OV, asthma Q 6 months. LOV 9/28/2017.    Patient Instructions   Return to do A1C and Lipids.     Follow up with me in 3-6 months or sooner if problems arise.     DENISSE Kerr          -patient has not followed up, fasting labs pending.    Patient notified and understands plan.  Gave one inhaler to get him by for the meantime until he is able to make an OV.    Edie DANIEL RN

## 2018-07-31 ENCOUNTER — OFFICE VISIT (OUTPATIENT)
Dept: FAMILY MEDICINE | Facility: CLINIC | Age: 48
End: 2018-07-31
Payer: COMMERCIAL

## 2018-07-31 VITALS
TEMPERATURE: 97.9 F | SYSTOLIC BLOOD PRESSURE: 116 MMHG | WEIGHT: 226.7 LBS | HEART RATE: 72 BPM | BODY MASS INDEX: 27.61 KG/M2 | HEIGHT: 76 IN | DIASTOLIC BLOOD PRESSURE: 82 MMHG

## 2018-07-31 DIAGNOSIS — E78.5 HYPERLIPIDEMIA LDL GOAL <100: ICD-10-CM

## 2018-07-31 DIAGNOSIS — R59.1 LYMPHADENOPATHY: ICD-10-CM

## 2018-07-31 DIAGNOSIS — J45.40 MODERATE PERSISTENT ASTHMA WITHOUT COMPLICATION: ICD-10-CM

## 2018-07-31 DIAGNOSIS — K21.9 GASTROESOPHAGEAL REFLUX DISEASE WITHOUT ESOPHAGITIS: ICD-10-CM

## 2018-07-31 DIAGNOSIS — Z00.00 ENCOUNTER FOR ROUTINE ADULT HEALTH EXAMINATION WITHOUT ABNORMAL FINDINGS: ICD-10-CM

## 2018-07-31 DIAGNOSIS — I10 ESSENTIAL HYPERTENSION: Primary | ICD-10-CM

## 2018-07-31 DIAGNOSIS — E11.9 TYPE 2 DIABETES MELLITUS WITHOUT COMPLICATION, WITHOUT LONG-TERM CURRENT USE OF INSULIN (H): ICD-10-CM

## 2018-07-31 PROCEDURE — 99396 PREV VISIT EST AGE 40-64: CPT | Performed by: NURSE PRACTITIONER

## 2018-07-31 RX ORDER — PRAVASTATIN SODIUM 20 MG
20 TABLET ORAL DAILY
Qty: 90 TABLET | Refills: 3 | Status: SHIPPED | OUTPATIENT
Start: 2018-07-31 | End: 2019-03-20

## 2018-07-31 RX ORDER — ALBUTEROL SULFATE 90 UG/1
AEROSOL, METERED RESPIRATORY (INHALATION)
Qty: 18 G | Refills: 6 | Status: SHIPPED | OUTPATIENT
Start: 2018-07-31 | End: 2019-01-25

## 2018-07-31 RX ORDER — LOSARTAN POTASSIUM 100 MG/1
100 TABLET ORAL DAILY
Qty: 90 TABLET | Refills: 3 | Status: SHIPPED | OUTPATIENT
Start: 2018-07-31 | End: 2019-07-19

## 2018-07-31 RX ORDER — NIFEDIPINE 30 MG/1
30 TABLET, EXTENDED RELEASE ORAL DAILY
Qty: 90 TABLET | Refills: 3 | Status: SHIPPED | OUTPATIENT
Start: 2018-07-31 | End: 2019-07-19

## 2018-07-31 RX ORDER — BUDESONIDE AND FORMOTEROL FUMARATE DIHYDRATE 160; 4.5 UG/1; UG/1
AEROSOL RESPIRATORY (INHALATION)
Qty: 10.2 G | Refills: 11 | Status: SHIPPED | OUTPATIENT
Start: 2018-07-31 | End: 2019-07-19

## 2018-07-31 NOTE — PROGRESS NOTES
SUBJECTIVE:   CC: Juan Pablo Abraham is an 48 year old male who presents for preventative health visit.     Healthy Habits:    Do you get at least three servings of calcium containing foods daily (dairy, green leafy vegetables, etc.)? yes    Amount of exercise or daily activities, outside of work: 4 day(s) per week    Problems taking medications regularly No    Medication side effects: No    Have you had an eye exam in the past two years? no    Do you see a dentist twice per year? no    Do you have sleep apnea, excessive snoring or daytime drowsiness?no       Diabetes Follow-up      Patient is checking blood sugars: not at all    Diabetic concerns: None     Symptoms of hypoglycemia (low blood sugar): shaky, dizzy     Paresthesias (numbness or burning in feet) or sores: No     Date of last diabetic eye exam: not done.        Diabetes Management Resources    Hyperlipidemia Follow-Up      Rate your low fat/cholesterol diet?: good    Taking statin?  Yes, no muscle aches from statin    Other lipid medications/supplements?:  none    Hypertension Follow-up      Outpatient blood pressures are not being checked.    Low Salt Diet: no added salt    BP Readings from Last 2 Encounters:   07/31/18 116/82   09/28/17 123/81     Hemoglobin A1C (%)   Date Value   06/24/2017 6.0   11/11/2016 5.8     LDL Cholesterol Calculated (mg/dL)   Date Value   11/11/2016 131 (H)   03/03/2016 112 (H)     LDL Cholesterol Direct (mg/dL)   Date Value   03/03/2016 158 (H)     Asthma Follow-Up    Was ACT completed today?    Yes    ACT Total Scores 5/26/2018   ACT TOTAL SCORE -   ASTHMA ER VISITS -   ASTHMA HOSPITALIZATIONS -   ACT TOTAL SCORE (Goal Greater than or Equal to 20) 21   In the past 12 months, how many times did you visit the emergency room for your asthma without being admitted to the hospital? 0   In the past 12 months, how many times were you hospitalized overnight because of your asthma? 0       Recent asthma triggers that patient is  dealing with: upper respiratory infections and dust mites        Today's PHQ-2 Score:   PHQ-2 ( 1999 [employer]) 7/31/2018 9/28/2017   Q1: Little interest or pleasure in doing things 0 0   Q2: Feeling down, depressed or hopeless 0 0   PHQ-2 Score 0 0       Abuse: Current or Past(Physical, Sexual or Emotional)- No  Do you feel safe in your environment - Yes    Social History   Substance Use Topics     Smoking status: Former Smoker     Types: Cigarettes, Dip, chew, snus or snuff     Smokeless tobacco: Former User     Types: Chew     Quit date: 7/8/2007      Comment: smoked cigarettes but not a regular basis a long time ago now, occ chew-4 cans a month     Alcohol use 0.0 oz/week     0 Standard drinks or equivalent per week      Comment: 1 case a week      If you drink alcohol do you typically have >3 drinks per day or >7 drinks per week? A case of beer a week, varies                      Last PSA: No results found for: PSA    Reviewed orders with patient. Reviewed health maintenance and updated orders accordingly - Yes  Labs reviewed in EPIC  BP Readings from Last 3 Encounters:   07/31/18 116/82   09/28/17 123/81   06/23/17 (!) 133/92    Wt Readings from Last 3 Encounters:   07/31/18 226 lb 11.2 oz (102.8 kg)   09/28/17 221 lb (100.2 kg)   06/23/17 229 lb (103.9 kg)                  Patient Active Problem List   Diagnosis     Hyperlipidemia LDL goal <100     Type 2 diabetes mellitus without complication (H)     Essential hypertension     Moderate persistent asthma without complication     Gastroesophageal reflux disease without esophagitis     History reviewed. No pertinent surgical history.    Social History   Substance Use Topics     Smoking status: Former Smoker     Types: Cigarettes, Dip, chew, snus or snuff     Smokeless tobacco: Former User     Types: Chew     Quit date: 7/8/2007      Comment: smoked cigarettes but not a regular basis a long time ago now, occ chew-4 cans a month     Alcohol use 0.0 oz/week     0  Standard drinks or equivalent per week      Comment: 1 case a week     Family History   Problem Relation Age of Onset     Hypertension Father      Cerebrovascular Disease Father       at 56, CVA in his 40s     Cancer Father      Circulatory Maternal Grandmother      CHF     Hypertension Maternal Grandmother      Alcohol/Drug Maternal Grandfather      Unknown/Adopted Paternal Grandmother      Unknown/Adopted Paternal Grandfather      Hypertension Mother      Diabetes No family hx of          Current Outpatient Prescriptions   Medication Sig Dispense Refill     albuterol (2.5 MG/3ML) 0.083% neb solution Take 1 vial (2.5 mg) by nebulization every 6 hours as needed for shortness of breath / dyspnea or wheezing 30 vial 2     albuterol (VENTOLIN HFA) 108 (90 Base) MCG/ACT Inhaler INHALE TWO PUFFS INTO THE LUNGS EVERY 4 HOURS AS NEEDED 18 g 6     budesonide-formoterol (SYMBICORT) 160-4.5 MCG/ACT Inhaler INHALE 2 PUFFS BY MOUTH INTO THE LUNGS TWO TIMES A DAY 10.2 g 11     losartan (COZAAR) 100 MG tablet Take 1 tablet (100 mg) by mouth daily 90 tablet 3     NIFEdipine ER osmotic (PROCARDIA XL) 30 MG 24 hr tablet Take 1 tablet (30 mg) by mouth daily 90 tablet 3     omeprazole (PRILOSEC) 20 MG CR capsule Take 1 capsule (20 mg) by mouth daily 90 capsule 1     pravastatin (PRAVACHOL) 20 MG tablet Take 1 tablet (20 mg) by mouth daily 90 tablet 3     ASPIRIN NOT PRESCRIBED, INTENTIONAL, continuous prn for other Antiplatelet medication not prescribed intentionally due to Not indicated based on age 1 each 0     FREESTYLE LANCETS MISC Use 1 daily 100 each 12     glucose blood VI test strips (FREESTYLE LITE) strip Use to test blood sugars 2 times daily or as directed. 100 strip 12     [DISCONTINUED] losartan (COZAAR) 100 MG tablet Take 1 tablet (100 mg) by mouth daily 90 tablet 3     [DISCONTINUED] NIFEdipine ER osmotic (PROCARDIA XL) 30 MG 24 hr tablet Take 1 tablet (30 mg) by mouth daily 90 tablet 3     [DISCONTINUED]  pravastatin (PRAVACHOL) 20 MG tablet Take 1 tablet (20 mg) by mouth daily 90 tablet 3     [DISCONTINUED] SYMBICORT 160-4.5 MCG/ACT Inhaler INHALE 2 PUFFS BY MOUTH INTO THE LUNGS TWO TIMES A DAY 10.2 g 0     [DISCONTINUED] VENTOLIN  (90 Base) MCG/ACT Inhaler INHALE TWO PUFFS INTO THE LUNGS EVERY 4 HOURS AS NEEDED 18 g 5     No Known Allergies  Recent Labs   Lab Test  06/24/17   0725  11/11/16   0655  03/03/16   0603  05/09/15   0720  06/27/14   0644   A1C  6.0  5.8  6.0  6.0  5.8   LDL   --   131*  112*  158*  151*   --    HDL   --   50  60  43   --    TRIG   --   73  70  76   --    CR  0.90   --   0.92  0.95  1.03   GFRESTIMATED  >90  Non  GFR Calc     --   89  86  78   GFRESTBLACK  >90   GFR Calc     --   >90   GFR Calc    >90   GFR Calc    >90   POTASSIUM  3.4   --   4.1  3.5  3.7   TSH   --   1.60   --    --   2.05        Reviewed and updated as needed this visit by clinical staff  Tobacco  Allergies  Meds  Problems  Med Hx  Surg Hx  Fam Hx  Soc Hx          Reviewed and updated as needed this visit by Provider  Allergies  Meds  Problems          History reviewed. No pertinent past medical history.   History reviewed. No pertinent surgical history.         ROS:  CONSTITUTIONAL: NEGATIVE for fever, chills, change in weight  INTEGUMENTARY/SKIN: NEGATIVE for worrisome rashes, moles or lesions  EYES: NEGATIVE for vision changes or irritation  ENT: NEGATIVE for ear, mouth and throat problems  RESP: NEGATIVE for significant cough or SOB  CV: NEGATIVE for chest pain, palpitations or peripheral edema  GI: NEGATIVE for nausea, abdominal pain, heartburn, or change in bowel habits   male: negative for dysuria, hematuria, decreased urinary stream, erectile dysfunction, urethral discharge  MUSCULOSKELETAL: NEGATIVE for significant arthralgias or myalgia  NEURO: NEGATIVE for weakness, dizziness or paresthesias  PSYCHIATRIC: NEGATIVE for changes  "in mood or affect    OBJECTIVE:   /82  Pulse 72  Temp 97.9  F (36.6  C) (Tympanic)  Ht 6' 3.5\" (1.918 m)  Wt 226 lb 11.2 oz (102.8 kg)  BMI 27.96 kg/m2  EXAM:  GENERAL: healthy, alert and no distress  EYES: Eyes grossly normal to inspection, PERRL and conjunctivae and sclerae normal  HENT: ear canals and TM's normal, nose and mouth without ulcers or lesions  NECK: no asymmetry, masses, or scars, thyroid normal to palpation and left anterior lymphadenopathy soft, non mobile measuring 2 cm in size.  RESP: lungs clear to auscultation - no rales, rhonchi or wheezes  CV: regular rate and rhythm, normal S1 S2, no S3 or S4, no murmur, click or rub, no peripheral edema and peripheral pulses strong  ABDOMEN: soft, nontender, no hepatosplenomegaly, no masses and bowel sounds normal  MS: no gross musculoskeletal defects noted, no edema  SKIN: no suspicious lesions or rashes  NEURO: Normal strength and tone, mentation intact and speech normal  PSYCH: mentation appears normal, affect normal/bright    Diagnostic Test Results:  Labs ordered.    ASSESSMENT/PLAN:   1. Encounter for routine adult health examination without abnormal findings       2. Essential hypertension  Controlled.    - NIFEdipine ER osmotic (PROCARDIA XL) 30 MG 24 hr tablet; Take 1 tablet (30 mg) by mouth daily  Dispense: 90 tablet; Refill: 3  - losartan (COZAAR) 100 MG tablet; Take 1 tablet (100 mg) by mouth daily  Dispense: 90 tablet; Refill: 3    3. Moderate persistent asthma without complication  Controlled.  ACT reviewed.    - budesonide-formoterol (SYMBICORT) 160-4.5 MCG/ACT Inhaler; INHALE 2 PUFFS BY MOUTH INTO THE LUNGS TWO TIMES A DAY  Dispense: 10.2 g; Refill: 11  - albuterol (VENTOLIN HFA) 108 (90 Base) MCG/ACT Inhaler; INHALE TWO PUFFS INTO THE LUNGS EVERY 4 HOURS AS NEEDED  Dispense: 18 g; Refill: 6    4. Type 2 diabetes mellitus without complication, without long-term current use of insulin (H)  Controlled.  Due for labs.    - " "OPHTHALMOLOGY ADULT REFERRAL  - NIFEdipine ER osmotic (PROCARDIA XL) 30 MG 24 hr tablet; Take 1 tablet (30 mg) by mouth daily  Dispense: 90 tablet; Refill: 3  - losartan (COZAAR) 100 MG tablet; Take 1 tablet (100 mg) by mouth daily  Dispense: 90 tablet; Refill: 3  - Hemoglobin A1c; Future  - TSH; Future  - T4 FREE; Future  - Basic metabolic panel; Future  - ALT; Future  - Lipid panel reflex to direct LDL Fasting; Future    5. Lymphadenopathy   Monitor this.  History of allergies - this has been worse lately.  If not resolving over the next 2-3 weeks follow up with me for further imaging.    6. Hyperlipidemia LDL goal <100     - pravastatin (PRAVACHOL) 20 MG tablet; Take 1 tablet (20 mg) by mouth daily  Dispense: 90 tablet; Refill: 3  - ALT; Future  - Lipid panel reflex to direct LDL Fasting; Future    7. Gastroesophageal reflux disease without esophagitis     - omeprazole (PRILOSEC) 20 MG CR capsule; Take 1 capsule (20 mg) by mouth daily  Dispense: 90 capsule; Refill: 1    COUNSELING:  Reviewed preventive health counseling, as reflected in patient instructions       Regular exercise       Healthy diet/nutrition       Aspirin Prophylaxsis    BP Readings from Last 1 Encounters:   07/31/18 116/82     Estimated body mass index is 27.96 kg/(m^2) as calculated from the following:    Height as of this encounter: 6' 3.5\" (1.918 m).    Weight as of this encounter: 226 lb 11.2 oz (102.8 kg).           reports that he has quit smoking. His smoking use included Cigarettes and Dip, chew, snus or snuff. He quit smokeless tobacco use about 11 years ago. His smokeless tobacco use included Chew.      Counseling Resources:  ATP IV Guidelines  Pooled Cohorts Equation Calculator  FRAX Risk Assessment  ICSI Preventive Guidelines  Dietary Guidelines for Americans, 2010  [employer]'s MyPlate  ASA Prophylaxis  Lung CA Screening    Deann Gilman NP  [facility]  "

## 2018-07-31 NOTE — MR AVS SNAPSHOT
After Visit Summary   7/31/2018    Juan Pablo Abraham    MRN: [id number]           Patient Information     Date Of Birth          1970        Visit Information        Provider Department      7/31/2018 10:40 AM Deann Gilman NP [facility]        Today's Diagnoses     Essential hypertension    -  1    Moderate persistent asthma without complication        Type 2 diabetes mellitus without complication, without long-term current use of insulin (H)        Hyperlipidemia LDL goal <100        Gastroesophageal reflux disease without esophagitis          Care Instructions      Preventive Health Recommendations  Male Ages 40 to 49    Yearly exam:             See your health care provider every year in order to  o   Review health changes.   o   Discuss preventive care.    o   Review your medicines if your doctor has prescribed any.    You should be tested each year for STDs (sexually transmitted diseases) if you re at risk.     Have a cholesterol test every 5 years.     Have a colonoscopy (test for colon cancer) if someone in your family has had colon cancer or polyps before age 50.     After age 45, have a diabetes test (fasting glucose). If you are at risk for diabetes, you should have this test every 3 years.      Talk with your health care provider about whether or not a prostate cancer screening test (PSA) is right for you.    Shots: Get a flu shot each year. Get a tetanus shot every 10 years.     Nutrition:    Eat at least 5 servings of fruits and vegetables daily.     Eat whole-grain bread, whole-wheat pasta and brown rice instead of white grains and rice.     Get adequate Calcium and Vitamin D.     Lifestyle    Exercise for at least 150 minutes a week (30 minutes a day, 5 days a week). This will help you control your weight and prevent disease.     Limit alcohol to one drink per day.     No smoking.     Wear sunscreen to prevent skin cancer.     See your dentist every six months  for an exam and cleaning.              Follow-ups after your visit        Additional Services     OPHTHALMOLOGY ADULT REFERRAL       Your provider has referred you to: [facility]: [facility] [phone]   [email or web address]    Please be aware that coverage of these services is subject to the terms and limitations of your health insurance plan.  Call member services at your health plan with any benefit or coverage questions.      Please bring the following with you to your appointment:    (1) Any X-Rays, CTs or MRIs which have been performed.  Contact the facility where they were done to arrange for  prior to your scheduled appointment.    (2) List of current medications  (3) This referral request   (4) Any documents/labs given to you for this referral                  Who to contact     If you have questions or need follow up information about today's clinic visit or your schedule please contact [facility] directly at [phone].  Normal or non-critical lab and imaging results will be communicated to you by MyChart, letter or phone within 4 business days after the clinic has received the results. If you do not hear from us within 7 days, please contact the clinic through [employer] or phone. If you have a critical or abnormal lab result, we will notify you by phone as soon as possible.  Submit refill requests through [employer] or call your pharmacy and they will forward the refill request to us. Please allow 3 business days for your refill to be completed.          Additional Information About Your Visit        [employer] Information     [employer] gives you secure access to your electronic health record. If you see a primary care provider, you can also send messages to your care team and make appointments. If you have questions, please call your primary care clinic.  If you do not have a primary care provider, please call [phone] and they will assist you.        Care  "EveryWhere ID     This is your Care EveryWhere ID. This could be used by other organizations to access your [place] medical records  [id number]        Your Vitals Were     Pulse Temperature Height BMI (Body Mass Index)          72 97.9  F (36.6  C) (Tympanic) 6' 3.5\" (1.918 m) 27.96 kg/m2         Blood Pressure from Last 3 Encounters:   07/31/18 116/82   09/28/17 123/81   06/23/17 (!) 133/92    Weight from Last 3 Encounters:   07/31/18 226 lb 11.2 oz (102.8 kg)   09/28/17 221 lb (100.2 kg)   06/23/17 229 lb (103.9 kg)              We Performed the Following     Basic metabolic panel     Hemoglobin A1c     Lipid panel reflex to direct LDL     OPHTHALMOLOGY ADULT REFERRAL     T4 FREE     TSH          Where to get your medicines      These medications were sent to [facility] [address]  [address] [facility] [address]     Phone:  [phone]     albuterol 108 (90 Base) MCG/ACT Inhaler    budesonide-formoterol 160-4.5 MCG/ACT Inhaler    losartan 100 MG tablet    NIFEdipine ER osmotic 30 MG 24 hr tablet    omeprazole 20 MG CR capsule    pravastatin 20 MG tablet          Primary Care Provider Office Phone # Fax #    Deann Gilman [phone] [address] [facility] [address]        Equal Access to Services     PERLA GUTIERREZ AH: Hadii aad ku hadasho Soomaali, waaxda luqadaha, qaybta kaalmada adeegyada, waxay srinath hayshobha zambrano. So [facility] [phone].    ATENCIÓN: Si habla español, tiene a lees disposición servicios gratuitos de asistencia lingüística. Veronica mcdonald [phone].    We comply with applicable federal civil rights laws and [place] laws. We do not discriminate on the basis of race, color, national origin, age, disability, sex, sexual orientation, or gender identity.            Thank you!     Thank you for choosing [facility]  for your care. Our goal is always to provide you with excellent care. Hearing back " from our patients is one way we can continue to improve our services. Please take a few minutes to complete the written survey that you may receive in the mail after your visit with us. Thank you!             Your Updated Medication List - Protect others around you: Learn how to safely use, store and throw away your medicines at www.disposemymeds.org.          This list is accurate as of 7/31/18 11:16 AM.  Always use your most recent med list.                   Brand Name Dispense Instructions for use Diagnosis    * albuterol (2.5 MG/3ML) 0.083% neb solution     30 vial    Take 1 vial (2.5 mg) by nebulization every 6 hours as needed for shortness of breath / dyspnea or wheezing    Moderate persistent asthma without complication       * albuterol 108 (90 Base) MCG/ACT Inhaler    VENTOLIN HFA    18 g    INHALE TWO PUFFS INTO THE LUNGS EVERY 4 HOURS AS NEEDED    Moderate persistent asthma without complication       ASPIRIN NOT PRESCRIBED    INTENTIONAL    1 each    continuous prn for other Antiplatelet medication not prescribed intentionally due to Not indicated based on age        blood glucose monitoring lancets     100 each    Use 1 daily    Type 2 diabetes, HbA1c goal < 7% (H)       blood glucose monitoring test strip    FREESTYLE LITE    100 strip    Use to test blood sugars 2 times daily or as directed.    Type 2 diabetes, HbA1c goal < 7% (H)       budesonide-formoterol 160-4.5 MCG/ACT Inhaler    SYMBICORT    10.2 g    INHALE 2 PUFFS BY MOUTH INTO THE LUNGS TWO TIMES A DAY    Moderate persistent asthma without complication       losartan 100 MG tablet    COZAAR    90 tablet    Take 1 tablet (100 mg) by mouth daily    Essential hypertension, Type 2 diabetes mellitus without complication, without long-term current use of insulin (H)       NIFEdipine ER osmotic 30 MG 24 hr tablet    PROCARDIA XL    90 tablet    Take 1 tablet (30 mg) by mouth daily    Essential hypertension, Type 2 diabetes mellitus without  complication, without long-term current use of insulin (H)       omeprazole 20 MG CR capsule    priLOSEC    90 capsule    Take 1 capsule (20 mg) by mouth daily    Gastroesophageal reflux disease without esophagitis       pravastatin 20 MG tablet    PRAVACHOL    90 tablet    Take 1 tablet (20 mg) by mouth daily    Hyperlipidemia LDL goal <100       * Notice:  This list has 2 medication(s) that are the same as other medications prescribed for you. Read the directions carefully, and ask your doctor or other care provider to review them with you.

## 2018-08-14 DIAGNOSIS — E11.9 TYPE 2 DIABETES MELLITUS WITHOUT COMPLICATION, WITHOUT LONG-TERM CURRENT USE OF INSULIN (H): ICD-10-CM

## 2018-08-14 DIAGNOSIS — E78.5 HYPERLIPIDEMIA LDL GOAL <100: ICD-10-CM

## 2018-08-14 LAB
ALT SERPL W P-5'-P-CCNC: 132 U/L (ref 0–70)
ANION GAP SERPL CALCULATED.3IONS-SCNC: 7 MMOL/L (ref 3–14)
BUN SERPL-MCNC: 20 MG/DL (ref 7–30)
CALCIUM SERPL-MCNC: 9 MG/DL (ref 8.5–10.1)
CHLORIDE SERPL-SCNC: 109 MMOL/L (ref 94–109)
CHOLEST SERPL-MCNC: 183 MG/DL
CO2 SERPL-SCNC: 23 MMOL/L (ref 20–32)
CREAT SERPL-MCNC: 0.93 MG/DL (ref 0.66–1.25)
GFR SERPL CREATININE-BSD FRML MDRD: 86 ML/MIN/1.7M2
GLUCOSE SERPL-MCNC: 84 MG/DL (ref 70–99)
HBA1C MFR BLD: 5.3 % (ref 0–5.6)
HDLC SERPL-MCNC: 44 MG/DL
LDLC SERPL CALC-MCNC: 125 MG/DL
NONHDLC SERPL-MCNC: 139 MG/DL
POTASSIUM SERPL-SCNC: 3.6 MMOL/L (ref 3.4–5.3)
SODIUM SERPL-SCNC: 139 MMOL/L (ref 133–144)
T4 FREE SERPL-MCNC: 0.91 NG/DL (ref 0.76–1.46)
TRIGL SERPL-MCNC: 69 MG/DL
TSH SERPL DL<=0.005 MIU/L-ACNC: 1.78 MU/L (ref 0.4–4)

## 2018-08-14 PROCEDURE — 84439 ASSAY OF FREE THYROXINE: CPT | Performed by: NURSE PRACTITIONER

## 2018-08-14 PROCEDURE — 84460 ALANINE AMINO (ALT) (SGPT): CPT | Performed by: NURSE PRACTITIONER

## 2018-08-14 PROCEDURE — 84443 ASSAY THYROID STIM HORMONE: CPT | Performed by: NURSE PRACTITIONER

## 2018-08-14 PROCEDURE — 83036 HEMOGLOBIN GLYCOSYLATED A1C: CPT | Performed by: NURSE PRACTITIONER

## 2018-08-14 PROCEDURE — 80048 BASIC METABOLIC PNL TOTAL CA: CPT | Performed by: NURSE PRACTITIONER

## 2018-08-14 PROCEDURE — 80061 LIPID PANEL: CPT | Performed by: NURSE PRACTITIONER

## 2018-08-14 PROCEDURE — 36415 COLL VENOUS BLD VENIPUNCTURE: CPT | Performed by: NURSE PRACTITIONER

## 2019-01-25 DIAGNOSIS — J45.40 MODERATE PERSISTENT ASTHMA WITHOUT COMPLICATION: ICD-10-CM

## 2019-01-25 RX ORDER — ALBUTEROL SULFATE 90 UG/1
AEROSOL, METERED RESPIRATORY (INHALATION)
Qty: 18 G | Refills: 0 | Status: SHIPPED | OUTPATIENT
Start: 2019-01-25 | End: 2019-02-13

## 2019-01-25 NOTE — LETTER
[facility]  [address] [address]  Phone: [phone]       January 25, 2019         Juan Pablo Abraham  [address] [place] [id number]            Dear Juan Pablo:    We are concerned about your health care.  We recently provided you with medication refills.  Many medications require routine follow-up with your doctor. Please contact the clinic to update your symptom questionnaire.     Your prescription(s) have been refilled for 30 days so you may have time for the above noted follow-up. Please call to schedule soon so we can assure you have an appointment before your next refills are needed.    Thank you,      DENISSE Kerr / anne

## 2019-01-25 NOTE — TELEPHONE ENCOUNTER
"Due for clinic visit and ACT update  30 day marcial fill given. Reminder letter sent with ACT enclosed.      Aishwarya POOLE RN    Requested Prescriptions   Pending Prescriptions Disp Refills     albuterol (VENTOLIN HFA) 108 (90 Base) MCG/ACT inhaler [Pharmacy Med Name: VENTOLIN HFA 108MCG/ACT AERS] 18 g 1     Sig: INHALE 2 PUFFS INTO THE LUNGS EVERY 4 HOURS AS NEEDED    Asthma Maintenance Inhalers - Anticholinergics Failed - 1/25/2019  4:51 PM       Failed - Asthma control assessment score within normal limits in last 6 months    Please review ACT score.          Passed - Patient is age 12 years or older       Passed - Medication is active on med list       Passed - Recent (6 mo) or future (30 days) visit within the authorizing provider's specialty    Patient had office visit in the last 6 months or has a visit in the next 30 days with authorizing provider or within the authorizing provider's specialty.  See \"Patient Info\" tab in inbasket, or \"Choose Columns\" in Meds & Orders section of the refill encounter.              "

## 2019-02-13 DIAGNOSIS — J45.40 MODERATE PERSISTENT ASTHMA WITHOUT COMPLICATION: ICD-10-CM

## 2019-02-14 NOTE — TELEPHONE ENCOUNTER
"Requested Prescriptions   Pending Prescriptions Disp Refills     albuterol (VENTOLIN HFA) 108 (90 Base) MCG/ACT inhaler [Pharmacy Med Name: VENTOLIN HFA 108MCG/ACT AERS]  Last Written Prescription Date:  1/25/2019  Last Fill Quantity: 18g,  # refills: 0   Last office visit: 7/31/2018 with prescribing provider:  Kalina  Future Office Visit:     18 g 1     Sig: INHALE 2 PUFFS INTO THE LUNGS EVERY 4 HOURS AS NEEDED    Asthma Maintenance Inhalers - Anticholinergics Failed - 2/13/2019  8:05 AM       Failed - Asthma control assessment score within normal limits in last 6 months    Please review ACT score.          Failed - Recent (6 mo) or future (30 days) visit within the authorizing provider's specialty    Patient had office visit in the last 6 months or has a visit in the next 30 days with authorizing provider or within the authorizing provider's specialty.  See \"Patient Info\" tab in inbasket, or \"Choose Columns\" in Meds & Orders section of the refill encounter.           Passed - Patient is age 12 years or older       Passed - Medication is active on med list          "

## 2019-02-15 NOTE — TELEPHONE ENCOUNTER
Has not completed ACT yet.  LOV was 7-31-18, he is due for asthma recheck as well.  Dayna FRANKLIN RN

## 2019-02-18 RX ORDER — ALBUTEROL SULFATE 90 UG/1
AEROSOL, METERED RESPIRATORY (INHALATION)
Qty: 18 G | Refills: 1 | Status: SHIPPED | OUTPATIENT
Start: 2019-02-18 | End: 2019-04-26

## 2019-02-18 NOTE — TELEPHONE ENCOUNTER
Routing refill request to provider for review/approval because:  Dee Dee given x1 and patient did not follow up, please advise    Due for asthma recheck and updated ACT. ACT was sent via gripNote and to home address.     TAD HernandezN, RN

## 2019-03-20 ENCOUNTER — ANCILLARY PROCEDURE (OUTPATIENT)
Dept: GENERAL RADIOLOGY | Facility: CLINIC | Age: 49
End: 2019-03-20
Attending: NURSE PRACTITIONER
Payer: COMMERCIAL

## 2019-03-20 ENCOUNTER — OFFICE VISIT (OUTPATIENT)
Dept: FAMILY MEDICINE | Facility: CLINIC | Age: 49
End: 2019-03-20
Payer: COMMERCIAL

## 2019-03-20 VITALS
SYSTOLIC BLOOD PRESSURE: 134 MMHG | WEIGHT: 244 LBS | RESPIRATION RATE: 18 BRPM | TEMPERATURE: 97.5 F | DIASTOLIC BLOOD PRESSURE: 80 MMHG | HEART RATE: 104 BPM | HEIGHT: 76 IN | BODY MASS INDEX: 29.71 KG/M2

## 2019-03-20 DIAGNOSIS — R07.81 RIB PAIN ON RIGHT SIDE: ICD-10-CM

## 2019-03-20 DIAGNOSIS — S22.41XA CLOSED FRACTURE OF MULTIPLE RIBS OF RIGHT SIDE, INITIAL ENCOUNTER: Primary | ICD-10-CM

## 2019-03-20 PROCEDURE — 99213 OFFICE O/P EST LOW 20 MIN: CPT | Performed by: NURSE PRACTITIONER

## 2019-03-20 PROCEDURE — 71101 X-RAY EXAM UNILAT RIBS/CHEST: CPT | Mod: RT

## 2019-03-20 RX ORDER — CYCLOBENZAPRINE HCL 5 MG
5-10 TABLET ORAL 3 TIMES DAILY PRN
Qty: 45 TABLET | Refills: 0 | Status: SHIPPED | OUTPATIENT
Start: 2019-03-20 | End: 2019-03-27

## 2019-03-20 RX ORDER — HYDROCODONE BITARTRATE AND ACETAMINOPHEN 5; 325 MG/1; MG/1
1 TABLET ORAL EVERY 8 HOURS PRN
Qty: 60 TABLET | Refills: 0 | Status: SHIPPED | OUTPATIENT
Start: 2019-03-20 | End: 2019-04-01

## 2019-03-20 ASSESSMENT — MIFFLIN-ST. JEOR: SCORE: 2070.34

## 2019-03-20 ASSESSMENT — ASTHMA QUESTIONNAIRES
QUESTION_5 LAST FOUR WEEKS HOW WOULD YOU RATE YOUR ASTHMA CONTROL: WELL CONTROLLED
QUESTION_2 LAST FOUR WEEKS HOW OFTEN HAVE YOU HAD SHORTNESS OF BREATH: ONCE OR TWICE A WEEK
QUESTION_3 LAST FOUR WEEKS HOW OFTEN DID YOUR ASTHMA SYMPTOMS (WHEEZING, COUGHING, SHORTNESS OF BREATH, CHEST TIGHTNESS OR PAIN) WAKE YOU UP AT NIGHT OR EARLIER THAN USUAL IN THE MORNING: NOT AT ALL
ACUTE_EXACERBATION_TODAY: NO
QUESTION_4 LAST FOUR WEEKS HOW OFTEN HAVE YOU USED YOUR RESCUE INHALER OR NEBULIZER MEDICATION (SUCH AS ALBUTEROL): ONCE A WEEK OR LESS
ACT_TOTALSCORE: 21
QUESTION_1 LAST FOUR WEEKS HOW MUCH OF THE TIME DID YOUR ASTHMA KEEP YOU FROM GETTING AS MUCH DONE AT WORK, SCHOOL OR AT HOME: A LITTLE OF THE TIME

## 2019-03-20 NOTE — LETTER
[facility]  [address] [place] [address]  Phone: [phone]  Fax: [phone]    March 20, 2019        Juan Pablo Abraham  [address] [place] [id number]          To whom it may concern:    RE: Juan Pablo Abraham    Patient was seen and treated today at our clinic and missed work.     Will be off of work for the next 10 days.     Please contact me for questions or concerns.      Sincerely,        DOE Massey CNP

## 2019-03-20 NOTE — PATIENT INSTRUCTIONS
Patient Education     Rib Fracture    You broke one or more ribs. This is called a rib fracture. Rib fractures don't need a cast like other bones. They will heal by themselves in about 4 to 6 weeks. The first 3 to 4 weeks will be the most painful. During this time deep breathing, coughing, or changing position from sitting to lying down, may cause the broken ends to move slightly.  Home care    Rest. You should not be doing any heavy lifting or strenuous exertion until the pain goes away.    It hurts to breathe when you have a broken rib. This puts you at risk of getting pneumonia from poor airflow through your lungs. To prevent this:  ? Take several very deep breaths once an hour while you're awake. Breathe out through pursed lips as if you are blowing up a balloon. If possible, actually blow up a balloon or a rubber glove. This exercise builds up pressure inside the lung and prevents collapse of the small air sacs of the lung. This exercise may cause some pain at the site of injury. This is normal.  ? You may have gotten a breathing exercise device called an incentive spirometer. Use it at least 4 times a day, or as directed.    Apply an ice pack over the injured area for 15 to 20 minutes every 1 to 2 hours. You should do this for the first 24 to 48 hours. To make an ice pack, put ice cubes in a plastic bag that seals at the top. Wrap the bag in a clean, thin towel or cloth. Never put ice or an ice pack directly on the skin. Keep using ice packs as needed for the relief of pain and swelling.    You may use over-the-counter pain medicine to control pain, unless another pain medicine was prescribed. If you have chronic liver or kidney disease or ever had a stomach ulcer or GI (gastrointestinal) bleeding, talk with your healthcare provider before using these medicines.    If your pain is not controlled, contact your healthcare provider. Sometimes a stronger pain medicine may be needed. A nerve block can be done in  case of severe pain. It will numb the nerve between the ribs.  Follow-up care  Follow up with your healthcare provider, or as advised. In rare cases, a broken rib will cause complications in the first few days that may not be clearly seen during your initial exam. This can include collapsed lung, bleeding around the lung or into the belly (abdomen), or pneumonia. So watch for the signs below.  If X-rays were taken, you will be told of any new findings that may affect your care.  Call 911  Call 911 if you have:    Dizziness, weakness or fainting    Shortness of breath with or without chest discomfort    New or worsening abdominal pain    Discomfort in other areas of your upper body such as your shoulders, jaw, neck, or arms  When to seek medical advice  Call your healthcare provider right away if any of these occur:    Increasing chest pain with breathing    Fever of 100.4 F (38 C) or above, or as directed by your healthcare provider    Congested cough, nausea, or vomiting  Date Last Reviewed: 4/1/2018 2000-2018 [employer]. [address] [place] [address]. All rights reserved. This information is not intended as a substitute for professional medical care. Always follow your healthcare professional's instructions.           Patient Education     Rib Fracture (Broken Rib)     A chest x-ray may be done.     Your ribs are curved bones in your chest. They help protect your lungs and expand and contract when you breathe. Children's ribs bend easily and can often withstand a blow or fall. But adult ribs are more likely to break (fracture) under stress. Even coughing or a hard sneeze can fracture a rib.  When to go to the Emergency Room (ER)  Although they can be painful, most rib fractures aren't serious. But they often make it hard to cough or breathe deeply. Get medical care right away if you have:    Trouble breathing.    Nausea, vomiting, or stomach pain with a sore or bruised rib.    Pain  that worsens over time.    An injury to the chest or stomach.  What to expect in the ER  Here is what will happen in the ER:     A healthcare provider will ask about your injury and examine you carefully.    An X-ray of your chest will likely be taken to show any major damage to ribs and lungs. But ribs can have small breaks that don't show up on X-rays, even though they still hurt.    You may be given medicine to ease your discomfort.    In rare cases, rib fractures can cause a lung to collapse or lead to bleeding in the chest. In these cases, a tube will be inserted into the chest to reinflate the lung or drain the blood.  Follow-up  You are likely to heal in 6 to 8 weeks. Most rib fractures heal on their own with no lasting effects. Call your healthcare provider right away if you notice any of these symptoms:    Increased chest pain    Shortness of breath    Fever or chills    Coughing up blood  Date Last Reviewed: 4/1/2018 2000-2018 [employer]. [address] [place] [address], [place] [address]. All rights reserved. This information is not intended as a substitute for professional medical care. Always follow your healthcare professional's instructions.

## 2019-03-20 NOTE — PROGRESS NOTES
SUBJECTIVE:   Juan Pablo Abraham is a 48 year old male who presents to clinic today for the following health issues:    Concern - rib pain  Onset: two days    Description:   Patient fell on ice two days ago and injured the right side of his ribcage.    Intensity: severe    Progression of Symptoms:  same    Accompanying Signs & Symptoms:  Bruising, muscle spasms    Previous history of similar problem:   none    Precipitating factors:   Worsened by: moving    Alleviating factors:  Improved by: nothing    Therapies Tried and outcome: Advil, aleve, alcohol      Problem list and histories reviewed & adjusted, as indicated.  Additional history: as documented    Patient Active Problem List   Diagnosis     Hyperlipidemia LDL goal <100     Type 2 diabetes mellitus without complication (H)     Essential hypertension     Moderate persistent asthma without complication     Gastroesophageal reflux disease without esophagitis     History reviewed. No pertinent surgical history.    Social History     Tobacco Use     Smoking status: Former Smoker     Types: Cigarettes, Dip, chew, snus or snuff     Smokeless tobacco: Former User     Types: Chew     Quit date: 2007     Tobacco comment: smoked cigarettes but not a regular basis a long time ago now, occ chew-4 cans a month   Substance Use Topics     Alcohol use: Yes     Alcohol/week: 0.0 oz     Comment: 1 case a week     Family History   Problem Relation Age of Onset     Hypertension Father      Cerebrovascular Disease Father          at 56, CVA in his 40s     Cancer Father      Circulatory Maternal Grandmother         CHF     Hypertension Maternal Grandmother      Alcohol/Drug Maternal Grandfather      Unknown/Adopted Paternal Grandmother      Unknown/Adopted Paternal Grandfather      Hypertension Mother      Diabetes No family hx of          Current Outpatient Medications   Medication Sig Dispense Refill     albuterol (VENTOLIN HFA) 108 (90 Base) MCG/ACT inhaler INHALE 2 PUFFS  "INTO THE LUNGS EVERY 4 HOURS AS NEEDED 18 g 1     budesonide-formoterol (SYMBICORT) 160-4.5 MCG/ACT Inhaler INHALE 2 PUFFS BY MOUTH INTO THE LUNGS TWO TIMES A DAY 10.2 g 11     FREESTYLE LANCETS MISC Use 1 daily 100 each 12     glucose blood VI test strips (FREESTYLE LITE) strip Use to test blood sugars 2 times daily or as directed. 100 strip 12     losartan (COZAAR) 100 MG tablet Take 1 tablet (100 mg) by mouth daily 90 tablet 3     NIFEdipine ER osmotic (PROCARDIA XL) 30 MG 24 hr tablet Take 1 tablet (30 mg) by mouth daily 90 tablet 3     omeprazole (PRILOSEC) 20 MG CR capsule Take 1 capsule (20 mg) by mouth daily 90 capsule 1     ASPIRIN NOT PRESCRIBED, INTENTIONAL, continuous prn for other Antiplatelet medication not prescribed intentionally due to Not indicated based on age 1 each 0     No Known Allergies  Recent Labs   Lab Test 08/14/18  0650 06/24/17  0725 11/11/16  0655 03/03/16  0603   A1C 5.3 6.0 5.8 6.0   *  --  131* 112*  158*   HDL 44  --  50 60   TRIG 69  --  73 70   *  --   --   --    CR 0.93 0.90  --  0.92   GFRESTIMATED 86 >90  Non  GFR Calc    --  89   GFRESTBLACK >90 >90  African American GFR Calc    --  >90   GFR Calc     POTASSIUM 3.6 3.4  --  4.1   TSH 1.78  --  1.60  --       BP Readings from Last 3 Encounters:   03/20/19 134/80   07/31/18 116/82   09/28/17 123/81    Wt Readings from Last 3 Encounters:   03/20/19 110.7 kg (244 lb)   07/31/18 102.8 kg (226 lb 11.2 oz)   09/28/17 100.2 kg (221 lb)                    Reviewed and updated as needed this visit by clinical staff       Reviewed and updated as needed this visit by Provider         ROS:  Constitutional, HEENT, cardiovascular, pulmonary, gi and gu systems are negative, except as otherwise noted.    OBJECTIVE:     /80 (BP Location: Right arm, Cuff Size: Adult Large)   Pulse 104   Temp 97.5  F (36.4  C) (Tympanic)   Resp 18   Ht 1.918 m (6' 3.5\")   Wt 110.7 kg (244 lb)   BMI 30.10 " kg/m    Body mass index is 30.1 kg/m .  GENERAL: healthy, alert and no distress  EYES: Eyes grossly normal to inspection, PERRL and conjunctivae and sclerae normal  HENT: ear canals and TM's normal, nose and mouth without ulcers or lesions  NECK: no adenopathy, no asymmetry, masses, or scars and thyroid normal to palpation  RESP: lungs clear to auscultation - no rales, rhonchi or wheezes  CV: regular rate and rhythm, normal S1 S2, no S3 or S4, no murmur, click or rub, no peripheral edema and peripheral pulses strong tenderness to the right ninth through 12th ribs at the mid axillary line.  ABDOMEN: soft, nontender, no hepatosplenomegaly, no masses and bowel sounds normal  MS: no gross musculoskeletal defects noted, no edema  SKIN: no suspicious lesions or rashes  NEURO: Normal strength and tone, mentation intact and speech normal  PSYCH: mentation appears normal, affect normal/bright    CHEST X-RAY AND RIGHT RIBS 3 VIEWS 3/20/2019 8:50 AM     HISTORY: rib pain post fall; Rib pain on right side     COMPARISON: None                                                                      IMPRESSION:      Heart and pulmonary vessels within normal limits. Lungs clear. No  pleural effusion. No evidence for pneumothorax. Nondisplaced fractures  right 9th, 10th and 11th ribs.    ASSESSMENT/PLAN:   (S22.41XA) Closed fracture of multiple ribs of right side, initial encounter  (primary encounter diagnosis)  Comment: Patient will be started on pain medications.  A 2-week period of pain medications was provided today.  Patient can have one refill without being re-seen.  Would be the goal after 3 weeks to 4 weeks the pain would be getting better.  So further pain medications are required after that patient should be re-seen.  Did provide patient with an incentive spirometer patient should use 2-3 times a day to help prevent pneumonia  Plan: Continue to monitor if not improving should return    (R07.81) Rib pain on right  side  Comment:   Plan: XR Ribs & Chest Right G/E 3 Views,         cyclobenzaprine (FLEXERIL) 5 MG tablet,         HYDROcodone-acetaminophen (NORCO) 5-325 MG         tablet          DOE Massey CNP  [facility]

## 2019-03-20 NOTE — LETTER
My Asthma Action Plan  Name: Juan Pablo Abraham   YOB: 1970  Date: 3/20/2019   My doctor: DOE Massey CNP   My clinic: [facility]        My Control Medicine: None  My Rescue Medicine: Albuterol (Proair/Ventolin/Proventil) inhaler .   My Asthma Severity: moderate persistent  Avoid your asthma triggers: Patient is unaware of triggers  upper respiratory infections  dust mites            GREEN ZONE   Good Control    I feel good    No cough or wheeze    Can work, sleep and play without asthma symptoms       Take your asthma control medicine every day.     1. If exercise triggers your asthma, take your rescue medication    15 minutes before exercise or sports, and    During exercise if you have asthma symptoms  2. Spacer to use with inhaler: If you have a spacer, make sure to use it with your inhaler             YELLOW ZONE Getting Worse  I have ANY of these:    I do not feel good    Cough or wheeze    Chest feels tight    Wake up at night   1. Keep taking your Green Zone medications  2. Start taking your rescue medicine:    every 20 minutes for up to 1 hour. Then every 4 hours for 24-48 hours.  3. If you stay in the Yellow Zone for more than 12-24 hours, contact your doctor.  4. If you do not return to the Green Zone in 12-24 hours or you get worse, start taking your oral steroid medicine if prescribed by your provider.           RED ZONE Medical Alert - Get Help  I have ANY of these:    I feel awful    Medicine is not helping    Breathing getting harder    Trouble walking or talking    Nose opens wide to breathe       1. Take your rescue medicine NOW  2. If your provider has prescribed an oral steroid medicine, start taking it NOW  3. Call your doctor NOW  4. If you are still in the Red Zone after 20 minutes and you have not reached your doctor:    Take your rescue medicine again and    Call 911 or go to the emergency room right away    See your regular doctor within 2 weeks of  an Emergency Room or Urgent Care visit for follow-up treatment.          Annual Reminders:  Meet with Asthma Educator,  Flu Shot in the Fall, consider Pneumonia Vaccination for patients with asthma (aged 19 and older).    Pharmacy:    [facility] [place], [place] - [address] [facility] [place], [place] - [address] [place] [address]                      Asthma Triggers  How To Control Things That Make Your Asthma Worse    Triggers are things that make your asthma worse.  Look at the list below to help you find your triggers and what you can do about them.  You can help prevent asthma flare-ups by staying away from your triggers.      Trigger                                                          What you can do   Cigarette Smoke  Tobacco smoke can make asthma worse. Do not allow smoking in your home, car or around you.  Be sure no one smokes at a child s day care or school.  If you smoke, ask your health care provider for ways to help you quit.  Ask family members to quit too.  Ask your health care provider for a referral to Quit Plan to help you quit smoking, or call 1-572-680-PLAN.     Colds, Flu, Bronchitis  These are common triggers of asthma. Wash your hands often.  Don t touch your eyes, nose or mouth.  Get a flu shot every year.     Dust Mites  These are tiny bugs that live in cloth or carpet. They are too small to see. Wash sheets and blankets in hot water every week.   Encase pillows and mattress in dust mite proof covers.  Avoid having carpet if you can. If you have carpet, vacuum weekly.   Use a dust mask and HEPA vacuum.   Pollen and Outdoor Mold  Some people are allergic to trees, grass, or weed pollen, or molds. Try to keep your windows closed.  Limit time out doors when pollen count is high.   Ask you health care provider about taking medicine during allergy season.     Animal Dander  Some people are allergic to skin flakes, urine or saliva from pets with fur or  feathers. Keep pets with fur or feathers out of your home.    If you can t keep the pet outdoors, then keep the pet out of your bedroom.  Keep the bedroom door closed.  Keep pets off cloth furniture and away from stuffed toys.     Mice, Rats, and Cockroaches  Some people are allergic to the waste from these pests.   Cover food and garbage.  Clean up spills and food crumbs.  Store grease in the refrigerator.   Keep food out of the bedroom.   Indoor Mold  This can be a trigger if your home has high moisture. Fix leaking faucets, pipes, or other sources of water.   Clean moldy surfaces.  Dehumidify basement if it is damp and smelly.   Smoke, Strong Odors, and Sprays  These can reduce air quality. Stay away from strong odors and sprays, such as perfume, powder, hair spray, paints, smoke incense, paint, cleaning products, candles and new carpet.   Exercise or Sports  Some people with asthma have this trigger. Be active!  Ask your doctor about taking medicine before sports or exercise to prevent symptoms.    Warm up for 5-10 minutes before and after sports or exercise.     Other Triggers of Asthma  Cold air:  Cover your nose and mouth with a scarf.  Sometimes laughing or crying can be a trigger.  Some medicines and food can trigger asthma.

## 2019-03-21 ASSESSMENT — ASTHMA QUESTIONNAIRES: ACT_TOTALSCORE: 21

## 2019-03-27 DIAGNOSIS — R07.81 RIB PAIN ON RIGHT SIDE: ICD-10-CM

## 2019-03-27 NOTE — TELEPHONE ENCOUNTER
cyclobenzaprine (FLEXERIL) 5 MG tablet      Last Written Prescription Date:  3/20/19  Last Fill Quantity: 45,   # refills: 0  Last Office Visit: 3/20/19  Future Office visit:       Routing refill request to provider for review/approval because:  Drug not on the [facility], [facility] or [facility] refill protocol or controlled substance

## 2019-03-29 RX ORDER — CYCLOBENZAPRINE HCL 5 MG
TABLET ORAL
Qty: 45 TABLET | Refills: 0 | Status: SHIPPED | OUTPATIENT
Start: 2019-03-29 | End: 2019-07-19

## 2019-04-01 ENCOUNTER — OFFICE VISIT (OUTPATIENT)
Dept: FAMILY MEDICINE | Facility: CLINIC | Age: 49
End: 2019-04-01
Payer: COMMERCIAL

## 2019-04-01 VITALS
HEIGHT: 76 IN | DIASTOLIC BLOOD PRESSURE: 70 MMHG | RESPIRATION RATE: 18 BRPM | SYSTOLIC BLOOD PRESSURE: 138 MMHG | BODY MASS INDEX: 28.25 KG/M2 | HEART RATE: 92 BPM | TEMPERATURE: 97.8 F | WEIGHT: 232 LBS

## 2019-04-01 DIAGNOSIS — R07.81 RIB PAIN ON RIGHT SIDE: ICD-10-CM

## 2019-04-01 DIAGNOSIS — S22.41XD CLOSED FRACTURE OF MULTIPLE RIBS OF RIGHT SIDE WITH ROUTINE HEALING, SUBSEQUENT ENCOUNTER: Primary | ICD-10-CM

## 2019-04-01 PROCEDURE — 99213 OFFICE O/P EST LOW 20 MIN: CPT | Performed by: NURSE PRACTITIONER

## 2019-04-01 ASSESSMENT — MIFFLIN-ST. JEOR: SCORE: 2015.91

## 2019-04-01 NOTE — PATIENT INSTRUCTIONS
Patient Education     Rib Fracture    You broke one or more ribs. This is called a rib fracture. Rib fractures don't need a cast like other bones. They will heal by themselves in about 4 to 6 weeks. The first 3 to 4 weeks will be the most painful. During this time deep breathing, coughing, or changing position from sitting to lying down, may cause the broken ends to move slightly.  Home care    Rest. You should not be doing any heavy lifting or strenuous exertion until the pain goes away.    It hurts to breathe when you have a broken rib. This puts you at risk of getting pneumonia from poor airflow through your lungs. To prevent this:  ? Take several very deep breaths once an hour while you're awake. Breathe out through pursed lips as if you are blowing up a balloon. If possible, actually blow up a balloon or a rubber glove. This exercise builds up pressure inside the lung and prevents collapse of the small air sacs of the lung. This exercise may cause some pain at the site of injury. This is normal.  ? You may have gotten a breathing exercise device called an incentive spirometer. Use it at least 4 times a day, or as directed.    Apply an ice pack over the injured area for 15 to 20 minutes every 1 to 2 hours. You should do this for the first 24 to 48 hours. To make an ice pack, put ice cubes in a plastic bag that seals at the top. Wrap the bag in a clean, thin towel or cloth. Never put ice or an ice pack directly on the skin. Keep using ice packs as needed for the relief of pain and swelling.    You may use over-the-counter pain medicine to control pain, unless another pain medicine was prescribed. If you have chronic liver or kidney disease or ever had a stomach ulcer or GI (gastrointestinal) bleeding, talk with your healthcare provider before using these medicines.    If your pain is not controlled, contact your healthcare provider. Sometimes a stronger pain medicine may be needed. A nerve block can be done in  case of severe pain. It will numb the nerve between the ribs.  Follow-up care  Follow up with your healthcare provider, or as advised. In rare cases, a broken rib will cause complications in the first few days that may not be clearly seen during your initial exam. This can include collapsed lung, bleeding around the lung or into the belly (abdomen), or pneumonia. So watch for the signs below.  If X-rays were taken, you will be told of any new findings that may affect your care.  Call 911  Call 911 if you have:    Dizziness, weakness or fainting    Shortness of breath with or without chest discomfort    New or worsening abdominal pain    Discomfort in other areas of your upper body such as your shoulders, jaw, neck, or arms  When to seek medical advice  Call your healthcare provider right away if any of these occur:    Increasing chest pain with breathing    Fever of 100.4 F (38 C) or above, or as directed by your healthcare provider    Congested cough, nausea, or vomiting  Date Last Reviewed: 4/1/2018 2000-2018 [employer]. [address] [place] [address], [place] [address]. All rights reserved. This information is not intended as a substitute for professional medical care. Always follow your healthcare professional's instructions.

## 2019-04-01 NOTE — PROGRESS NOTES
SUBJECTIVE:   Juan Pablo Abraham is a 48 year old male who presents to clinic today for the following health issues:    Patient is here to follow up with his rib injury. Patient states that he is feeling much better than he was originally.      Problem list and histories reviewed & adjusted, as indicated.  Additional history: as documented    Patient Active Problem List   Diagnosis     Hyperlipidemia LDL goal <100     Type 2 diabetes mellitus without complication (H)     Essential hypertension     Moderate persistent asthma without complication     Gastroesophageal reflux disease without esophagitis     History reviewed. No pertinent surgical history.    Social History     Tobacco Use     Smoking status: Former Smoker     Types: Cigarettes, Dip, chew, snus or snuff     Smokeless tobacco: Former User     Types: Chew     Quit date: 2007     Tobacco comment: smoked cigarettes but not a regular basis a long time ago now, occ chew-4 cans a month   Substance Use Topics     Alcohol use: Yes     Alcohol/week: 0.0 oz     Comment: 1 case a week     Family History   Problem Relation Age of Onset     Hypertension Father      Cerebrovascular Disease Father          at 56, CVA in his 40s     Cancer Father      Circulatory Maternal Grandmother         CHF     Hypertension Maternal Grandmother      Alcohol/Drug Maternal Grandfather      Unknown/Adopted Paternal Grandmother      Unknown/Adopted Paternal Grandfather      Hypertension Mother      Diabetes No family hx of          Current Outpatient Medications   Medication Sig Dispense Refill     albuterol (VENTOLIN HFA) 108 (90 Base) MCG/ACT inhaler INHALE 2 PUFFS INTO THE LUNGS EVERY 4 HOURS AS NEEDED 18 g 1     ASPIRIN NOT PRESCRIBED, INTENTIONAL, continuous prn for other Antiplatelet medication not prescribed intentionally due to Not indicated based on age 1 each 0     budesonide-formoterol (SYMBICORT) 160-4.5 MCG/ACT Inhaler INHALE 2 PUFFS BY MOUTH INTO THE LUNGS TWO TIMES  "A DAY 10.2 g 11     cyclobenzaprine (FLEXERIL) 5 MG tablet TAKE 1 TO 2 TABLETS BY MOUTH 3 TIMES DAILY AS NEEDED FOR MUSCLE SPASMS 45 tablet 0     FREESTYLE LANCETS MISC Use 1 daily 100 each 12     glucose blood VI test strips (FREESTYLE LITE) strip Use to test blood sugars 2 times daily or as directed. 100 strip 12     losartan (COZAAR) 100 MG tablet Take 1 tablet (100 mg) by mouth daily 90 tablet 3     NIFEdipine ER osmotic (PROCARDIA XL) 30 MG 24 hr tablet Take 1 tablet (30 mg) by mouth daily 90 tablet 3     omeprazole (PRILOSEC) 20 MG CR capsule Take 1 capsule (20 mg) by mouth daily 90 capsule 1     No Known Allergies  Recent Labs   Lab Test 08/14/18  0650 06/24/17  0725 11/11/16  0655 03/03/16  0603   A1C 5.3 6.0 5.8 6.0   *  --  131* 112*  158*   HDL 44  --  50 60   TRIG 69  --  73 70   *  --   --   --    CR 0.93 0.90  --  0.92   GFRESTIMATED 86 >90  Non  GFR Calc    --  89   GFRESTBLACK >90 >90  African American GFR Calc    --  >90   GFR Calc     POTASSIUM 3.6 3.4  --  4.1   TSH 1.78  --  1.60  --       BP Readings from Last 3 Encounters:   04/01/19 138/70   03/20/19 134/80   07/31/18 116/82    Wt Readings from Last 3 Encounters:   04/01/19 105.2 kg (232 lb)   03/20/19 110.7 kg (244 lb)   07/31/18 102.8 kg (226 lb 11.2 oz)                 Reviewed and updated as needed this visit by clinical staff       Reviewed and updated as needed this visit by Provider         ROS:  Constitutional, HEENT, cardiovascular, pulmonary, gi and gu systems are negative, except as otherwise noted.    OBJECTIVE:     /70 (BP Location: Right arm, Cuff Size: Adult Large)   Pulse 92   Temp 97.8  F (36.6  C) (Tympanic)   Resp 18   Ht 1.918 m (6' 3.5\")   Wt 105.2 kg (232 lb)   BMI 28.62 kg/m    Body mass index is 28.62 kg/m .  GENERAL: healthy, alert and no distress  EYES: Eyes grossly normal to inspection, PERRL and conjunctivae and sclerae normal  HENT: ear canals and TM's " normal, nose and mouth without ulcers or lesions  NECK: no adenopathy, no asymmetry, masses, or scars and thyroid normal to palpation  RESP: lungs clear to auscultation - no rales, rhonchi or wheezes  CV: regular rate and rhythm, normal S1 S2, no S3 or S4, no murmur, click or rub, no peripheral edema and peripheral pulses strong   MS: no gross musculoskeletal defects noted, no edema  SKIN: no suspicious lesions or rashes  NEURO: Normal strength and tone, mentation intact and speech normal  PSYCH: mentation appears normal, affect normal/bright    ASSESSMENT/PLAN:   (S22.41XD) Closed fracture of multiple ribs of right side with routine healing, subsequent encounter  (primary encounter diagnosis)  Comment: rib pain has improved   Plan: Patient status improved.  Patient will return to work without restrictions    (R07.81) Rib pain on right side  Comment:   Plan:       DOE Massey [facility]

## 2019-04-01 NOTE — LETTER
[facility]  [address] [place] [id number]  Phone: [phone]  Fax: [phone]    April 1, 2019        Juan Pablo Abraham  [address] [place] [id number]          To whom it may concern:    RE: Juan Pablo Abraham    Patient was seen and treated today at our clinic.  Patient may return to work 4/1/2019 with the following:  No working or lifting restrictions    Please contact me for questions or concerns.      Sincerely,        DOE Massey CNP

## 2019-04-26 DIAGNOSIS — J45.40 MODERATE PERSISTENT ASTHMA WITHOUT COMPLICATION: ICD-10-CM

## 2019-04-26 DIAGNOSIS — E11.9 TYPE 2 DIABETES MELLITUS WITHOUT COMPLICATION, WITH LONG-TERM CURRENT USE OF INSULIN (H): Primary | ICD-10-CM

## 2019-04-26 DIAGNOSIS — Z79.4 TYPE 2 DIABETES MELLITUS WITHOUT COMPLICATION, WITH LONG-TERM CURRENT USE OF INSULIN (H): Primary | ICD-10-CM

## 2019-04-29 RX ORDER — ALBUTEROL SULFATE 90 UG/1
AEROSOL, METERED RESPIRATORY (INHALATION)
Qty: 18 G | Refills: 1 | Status: SHIPPED | OUTPATIENT
Start: 2019-04-29 | End: 2019-07-19

## 2019-04-29 NOTE — TELEPHONE ENCOUNTER
"Requested Prescriptions   Pending Prescriptions Disp Refills     albuterol (VENTOLIN HFA) 108 (90 Base) MCG/ACT inhaler [Pharmacy Med Name: VENTOLIN HFA 108MCG/ACT AERS] 18 g 1     Sig: INHALE TWO PUFFS BY MOUTH EVERY 4 HOURS AS NEEDED   Last Written Prescription Date:  2/18/19  Last Fill Quantity: 18g,  # refills: 1   Last office visit: 4/1/2019 with prescribing provider:  FREDY Holt   Future Office Visit:        Asthma Maintenance Inhalers - Anticholinergics Passed - 4/26/2019  8:59 PM        Passed - Patient is age 12 years or older        Passed - Asthma control assessment score within normal limits in last 6 months     Please review ACT score.           Passed - Medication is active on med list        Passed - Recent (6 mo) or future (30 days) visit within the authorizing provider's specialty     Patient had office visit in the last 6 months or has a visit in the next 30 days with authorizing provider or within the authorizing provider's specialty.  See \"Patient Info\" tab in inbasket, or \"Choose Columns\" in Meds & Orders section of the refill encounter.              "

## 2019-04-29 NOTE — TELEPHONE ENCOUNTER
Prescription approved per [facility] Refill Protocol.  Camryn Marrero RNC

## 2019-06-24 ENCOUNTER — TELEPHONE (OUTPATIENT)
Dept: FAMILY MEDICINE | Facility: CLINIC | Age: 49
End: 2019-06-24

## 2019-06-24 NOTE — TELEPHONE ENCOUNTER
Panel Management Review      Patient has the following on his problem list:     Diabetes    ASA: Not Required     Last A1C  Lab Results   Component Value Date    A1C 5.3 08/14/2018    A1C 6.0 06/24/2017    A1C 5.8 11/11/2016    A1C 6.0 03/03/2016    A1C 6.0 05/09/2015     A1C tested: Passed    Last LDL:    Lab Results   Component Value Date    CHOL 183 08/14/2018     Lab Results   Component Value Date    HDL 44 08/14/2018     Lab Results   Component Value Date     08/14/2018     Lab Results   Component Value Date    TRIG 69 08/14/2018     Lab Results   Component Value Date    CHOLHDLRATIO 4.9 05/09/2015     Lab Results   Component Value Date    NHDL 139 08/14/2018       Is the patient on a Statin? NO             Is the patient on Aspirin? NO        Last three blood pressure readings:  BP Readings from Last 3 Encounters:   04/01/19 138/70   03/20/19 134/80   07/31/18 116/82       Date of last diabetes office visit: 7/31/18     Tobacco History:     History   Smoking Status     Former Smoker     Types: Cigarettes, Dip, chew, snus or snuff   Smokeless Tobacco     Former User     Types: Chew     Quit date: 7/8/2007     Comment: smoked cigarettes but not a regular basis a long time ago now, occ chew-4 cans a month           Composite cancer screening  Chart review shows that this patient is due/due soon for the following None  Summary:    Patient is due/failing the following:   Microalbumin, A1C, eye exam    Action needed:   Patient needs office visit for diabetes check and labs.    Type of outreach:    Phone, left message for patient to call back.     Questions for provider review:    None                                                                                                                                    Criss Toledo MA      Chart routed to Care Team .

## 2019-07-19 ENCOUNTER — OFFICE VISIT (OUTPATIENT)
Dept: FAMILY MEDICINE | Facility: CLINIC | Age: 49
End: 2019-07-19
Payer: COMMERCIAL

## 2019-07-19 VITALS
HEART RATE: 86 BPM | HEIGHT: 76 IN | DIASTOLIC BLOOD PRESSURE: 86 MMHG | WEIGHT: 223.9 LBS | RESPIRATION RATE: 16 BRPM | BODY MASS INDEX: 27.27 KG/M2 | SYSTOLIC BLOOD PRESSURE: 110 MMHG | TEMPERATURE: 97.5 F | OXYGEN SATURATION: 94 %

## 2019-07-19 DIAGNOSIS — I10 ESSENTIAL HYPERTENSION: ICD-10-CM

## 2019-07-19 DIAGNOSIS — J45.40 MODERATE PERSISTENT ASTHMA WITHOUT COMPLICATION: Primary | ICD-10-CM

## 2019-07-19 PROCEDURE — 99214 OFFICE O/P EST MOD 30 MIN: CPT | Performed by: NURSE PRACTITIONER

## 2019-07-19 RX ORDER — ALBUTEROL SULFATE 90 UG/1
AEROSOL, METERED RESPIRATORY (INHALATION)
Qty: 18 G | Refills: 11 | Status: SHIPPED | OUTPATIENT
Start: 2019-07-19 | End: 2020-05-20

## 2019-07-19 RX ORDER — NIFEDIPINE 30 MG/1
30 TABLET, EXTENDED RELEASE ORAL DAILY
Qty: 90 TABLET | Refills: 3 | Status: SHIPPED | OUTPATIENT
Start: 2019-07-19 | End: 2020-07-06

## 2019-07-19 RX ORDER — BUDESONIDE AND FORMOTEROL FUMARATE DIHYDRATE 160; 4.5 UG/1; UG/1
AEROSOL RESPIRATORY (INHALATION)
Qty: 10.2 G | Refills: 11 | Status: SHIPPED | OUTPATIENT
Start: 2019-07-19 | End: 2020-06-12

## 2019-07-19 RX ORDER — LOSARTAN POTASSIUM 100 MG/1
100 TABLET ORAL DAILY
Qty: 90 TABLET | Refills: 3 | Status: SHIPPED | OUTPATIENT
Start: 2019-07-19 | End: 2020-07-06

## 2019-07-19 ASSESSMENT — MIFFLIN-ST. JEOR: SCORE: 1974.16

## 2019-07-19 NOTE — PROGRESS NOTES
"Subjective     Juan Pablo Abraham is a 49 year old male who presents to clinic today for the following health issues: asthma and hypertension follow up. States asthma symptoms usually slightly worse during summer, he feels wheezy today, but denies shortness of breath  And would like to continue current treatment plan. Also, needs BP medications, BP well controlled     Chief Complaint   Patient presents with     Asthma     Refill Request     Albuterol, Symbicort      HPI   Asthma Follow-Up    Was ACT completed today?    Yes    ACT Total Scores 7/19/2019   ACT TOTAL SCORE -   ASTHMA ER VISITS -   ASTHMA HOSPITALIZATIONS -   ACT TOTAL SCORE (Goal Greater than or Equal to 20) 17   In the past 12 months, how many times did you visit the emergency room for your asthma without being admitted to the hospital? 0   In the past 12 months, how many times were you hospitalized overnight because of your asthma? 0       How many days per week do you miss taking your asthma controller medication?  I do not have an asthma controller medication    Please describe any recent triggers for your asthma: None    Have you had any Emergency Room Visits, Urgent Care Visits, or Hospital Admissions since your last office visit?  No        Amount of exercise or physical activity: None    Problems taking medications regularly: No    Medication side effects: none    Diet: regular (no restrictions)    Reviewed and updated as needed this visit by Provider         Review of Systems   ROS COMP: Constitutional, HEENT, cardiovascular, pulmonary, gi and gu systems are negative, except as otherwise noted.      Objective    /86 (BP Location: Left arm, Patient Position: Sitting, Cuff Size: Adult Regular)   Pulse 86   Temp 97.5  F (36.4  C) (Tympanic)   Resp 16   Ht 1.918 m (6' 3.5\")   Wt 101.6 kg (223 lb 14.4 oz)   SpO2 94%   BMI 27.62 kg/m    Body mass index is 27.62 kg/m .  Physical Exam   GENERAL: healthy, alert and no distress  RESP: mild " bilateral expiratory wheezes   CV: regular rate and rhythm, normal S1 S2, no S3 or S4, no murmur, click or rub, no peripheral edema and peripheral pulses strong  PSYCH: mentation appears normal, affect normal/bright    Diagnostic Test Results:  Labs reviewed in Epic        Assessment & Plan     1. Moderate persistent asthma without complication    - albuterol (VENTOLIN HFA) 108 (90 Base) MCG/ACT inhaler; INHALE TWO PUFFS BY MOUTH EVERY 4 HOURS AS NEEDED  Dispense: 18 g; Refill: 11  - budesonide-formoterol (SYMBICORT) 160-4.5 MCG/ACT Inhaler; INHALE 2 PUFFS BY MOUTH INTO THE LUNGS TWO TIMES A DAY  Dispense: 10.2 g; Refill: 11    2. Essential hypertension  -well controlled, refill provided   - losartan (COZAAR) 100 MG tablet; Take 1 tablet (100 mg) by mouth daily  Dispense: 90 tablet; Refill: 3  - NIFEdipine ER OSMOTIC (PROCARDIA XL) 30 MG 24 hr tablet; Take 1 tablet (30 mg) by mouth daily  Dispense: 90 tablet; Refill: 3    See Patient Instructions    Return in about 1 year (around 7/19/2020) for Routine Visit.    DOE Hernandez [facility]

## 2019-07-20 ASSESSMENT — ASTHMA QUESTIONNAIRES: ACT_TOTALSCORE: 17

## 2019-08-09 ENCOUNTER — TELEPHONE (OUTPATIENT)
Dept: FAMILY MEDICINE | Facility: CLINIC | Age: 49
End: 2019-08-09

## 2019-08-09 NOTE — TELEPHONE ENCOUNTER
Panel Management Review      Patient has the following on his problem list:     Asthma review     ACT Total Scores 7/19/2019   ACT TOTAL SCORE -   ASTHMA ER VISITS -   ASTHMA HOSPITALIZATIONS -   ACT TOTAL SCORE (Goal Greater than or Equal to 20) 17   In the past 12 months, how many times did you visit the emergency room for your asthma without being admitted to the hospital? 0   In the past 12 months, how many times were you hospitalized overnight because of your asthma? 0      1. Is Asthma diagnosis on the Problem List? Yes    2. Is Asthma listed on Health Maintenance? Yes    3. Patient is due for:  ACT    Diabetes    ASA: Passed    Last A1C  Lab Results   Component Value Date    A1C 5.3 08/14/2018    A1C 6.0 06/24/2017    A1C 5.8 11/11/2016    A1C 6.0 03/03/2016    A1C 6.0 05/09/2015     A1C tested: FAILED    Last LDL:    Lab Results   Component Value Date    CHOL 183 08/14/2018     Lab Results   Component Value Date    HDL 44 08/14/2018     Lab Results   Component Value Date     08/14/2018     Lab Results   Component Value Date    TRIG 69 08/14/2018     Lab Results   Component Value Date    CHOLHDLRATIO 4.9 05/09/2015     Lab Results   Component Value Date    NHDL 139 08/14/2018       Is the patient on a Statin? YES             Is the patient on Aspirin? YES        Last three blood pressure readings:  BP Readings from Last 3 Encounters:   07/19/19 110/86   04/01/19 138/70   03/20/19 134/80       Date of last diabetes office visit: 8/14/2018     Tobacco History:     History   Smoking Status     Former Smoker     Types: Cigarettes, Dip, chew, snus or snuff   Smokeless Tobacco     Former User     Types: Chew     Quit date: 7/8/2007     Comment: smoked cigarettes but not a regular basis a long time ago now, occ chew-4 cans a month           Composite cancer screening  Chart review shows that this patient is due/due soon for the following None  Summary:    Patient is due/failing the following:   A1C and  ACT    Action needed:   Patient needs office visit for DM. and Patient needs to do ACT.    Type of outreach:    Sent [employer] message.    Questions for provider review:    None                                                                                                                                    Nayla Vitale MA

## 2019-09-09 ENCOUNTER — TELEPHONE (OUTPATIENT)
Dept: FAMILY MEDICINE | Facility: CLINIC | Age: 49
End: 2019-09-09

## 2019-09-09 NOTE — TELEPHONE ENCOUNTER
Panel Management Review      Patient has the following on his problem list:     Asthma review     ACT Total Scores 7/19/2019   ACT TOTAL SCORE -   ASTHMA ER VISITS -   ASTHMA HOSPITALIZATIONS -   ACT TOTAL SCORE (Goal Greater than or Equal to 20) 17   In the past 12 months, how many times did you visit the emergency room for your asthma without being admitted to the hospital? 0   In the past 12 months, how many times were you hospitalized overnight because of your asthma? 0      1. Is Asthma diagnosis on the Problem List? Yes    2. Is Asthma listed on Health Maintenance? Yes    3. Patient is due for:  ACT    Diabetes    ASA: Passed    Last A1C  Lab Results   Component Value Date    A1C 5.3 08/14/2018    A1C 6.0 06/24/2017    A1C 5.8 11/11/2016    A1C 6.0 03/03/2016    A1C 6.0 05/09/2015     A1C tested: Passed    Last LDL:    Lab Results   Component Value Date    CHOL 183 08/14/2018     Lab Results   Component Value Date    HDL 44 08/14/2018     Lab Results   Component Value Date     08/14/2018     Lab Results   Component Value Date    TRIG 69 08/14/2018     Lab Results   Component Value Date    CHOLHDLRATIO 4.9 05/09/2015     Lab Results   Component Value Date    NHDL 139 08/14/2018       Is the patient on a Statin? NO             Is the patient on Aspirin? NO    Medications     Salicylates     ASPIRIN NOT PRESCRIBED, INTENTIONAL,             Last three blood pressure readings:  BP Readings from Last 3 Encounters:   07/19/19 110/86   04/01/19 138/70   03/20/19 134/80       Date of last diabetes office visit: 7/31/18     Tobacco History:     History   Smoking Status     Former Smoker     Types: Cigarettes, Dip, chew, snus or snuff   Smokeless Tobacco     Former User     Types: Chew     Quit date: 7/8/2007     Comment: smoked cigarettes but not a regular basis a long time ago now, occ chew-4 cans a month           Composite cancer screening  Chart review shows that this patient is due/due soon for the  following None  Summary:    Patient is due/failing the following:   A1C and ACT    Action needed:   Patient needs office visit for a diabetes and asthma check.    Type of outreach:    Phone, left message for patient to call back.     Questions for provider review:    None                                                                                                                                    Criss Toledo MA      Chart routed to Care Team .

## 2019-09-16 NOTE — TELEPHONE ENCOUNTER
Called and left message for patient to call clinic back. He needs an appointment for a diabetes check and labs.    Criss Toledo MA

## 2019-09-23 NOTE — TELEPHONE ENCOUNTER
Called and left message for patient to call clinic back. He needs to schedule a diabetes check and labs.    Criss Toledo MA

## 2019-10-16 ENCOUNTER — TELEPHONE (OUTPATIENT)
Dept: FAMILY MEDICINE | Facility: CLINIC | Age: 49
End: 2019-10-16

## 2019-10-16 NOTE — TELEPHONE ENCOUNTER
Panel Management Review      Patient has the following on his problem list:     Asthma review     ACT Total Scores 7/19/2019   ACT TOTAL SCORE -   ASTHMA ER VISITS -   ASTHMA HOSPITALIZATIONS -   ACT TOTAL SCORE (Goal Greater than or Equal to 20) 17   In the past 12 months, how many times did you visit the emergency room for your asthma without being admitted to the hospital? 0   In the past 12 months, how many times were you hospitalized overnight because of your asthma? 0      1. Is Asthma diagnosis on the Problem List? Yes    2. Is Asthma listed on Health Maintenance? Yes    3. Patient is due for:  none    Diabetes    ASA: Intentionally not prescribed.    Last A1C  Lab Results   Component Value Date    A1C 5.3 08/14/2018    A1C 6.0 06/24/2017    A1C 5.8 11/11/2016    A1C 6.0 03/03/2016    A1C 6.0 05/09/2015     A1C tested: Passed    Last LDL:    Lab Results   Component Value Date    CHOL 183 08/14/2018     Lab Results   Component Value Date    HDL 44 08/14/2018     Lab Results   Component Value Date     08/14/2018     Lab Results   Component Value Date    TRIG 69 08/14/2018     Lab Results   Component Value Date    CHOLHDLRATIO 4.9 05/09/2015     Lab Results   Component Value Date    NHDL 139 08/14/2018       Is the patient on a Statin? NO             Is the patient on Aspirin? NO    Medications     Salicylates     ASPIRIN NOT PRESCRIBED, INTENTIONAL,             Last three blood pressure readings:  BP Readings from Last 3 Encounters:   07/19/19 110/86   04/01/19 138/70   03/20/19 134/80       Date of last diabetes office visit: 7/31/2018     Tobacco History:     History   Smoking Status     Former Smoker     Types: Cigarettes, Dip, chew, snus or snuff   Smokeless Tobacco     Former User     Types: Chew     Quit date: 7/8/2007     Comment: smoked cigarettes but not a regular basis a long time ago now, occ chew-4 cans a month         Hypertension   Last three blood pressure readings:  BP Readings from  Last 3 Encounters:   07/19/19 110/86   04/01/19 138/70   03/20/19 134/80     Blood pressure: Passed    HTN Guidelines:  Less than 140/90      Composite cancer screening  Chart review shows that this patient is due/due soon for the following None  Summary:    Patient is due/failing the following:   A1C and LDL    Action needed:   Patient needs office visit for diabetic check .    Type of outreach:    Attempted to call patient. Patients voicemail is full. Unable to leave message. Will reach out to patient again in 1 month.     Questions for provider review:    None                                                                                                                                    Nandini De La Torre CMA       Chart routed to Care Team .

## 2019-11-06 ENCOUNTER — HEALTH MAINTENANCE LETTER (OUTPATIENT)
Age: 49
End: 2019-11-06

## 2019-12-09 ENCOUNTER — TELEPHONE (OUTPATIENT)
Dept: FAMILY MEDICINE | Facility: CLINIC | Age: 49
End: 2019-12-09

## 2019-12-09 NOTE — TELEPHONE ENCOUNTER
Panel Management Review      Patient has the following on his problem list:     Asthma review     ACT Total Scores 7/19/2019   ACT TOTAL SCORE -   ASTHMA ER VISITS -   ASTHMA HOSPITALIZATIONS -   ACT TOTAL SCORE (Goal Greater than or Equal to 20) 17   In the past 12 months, how many times did you visit the emergency room for your asthma without being admitted to the hospital? 0   In the past 12 months, how many times were you hospitalized overnight because of your asthma? 0      1. Is Asthma diagnosis on the Problem List? Yes    2. Is Asthma listed on Health Maintenance? Yes    3. Patient is due for:  none    Diabetes    ASA: Not Required     Last A1C  Lab Results   Component Value Date    A1C 5.3 08/14/2018    A1C 6.0 06/24/2017    A1C 5.8 11/11/2016    A1C 6.0 03/03/2016    A1C 6.0 05/09/2015     A1C tested: Passed    Last LDL:    Lab Results   Component Value Date    CHOL 183 08/14/2018     Lab Results   Component Value Date    HDL 44 08/14/2018     Lab Results   Component Value Date     08/14/2018     Lab Results   Component Value Date    TRIG 69 08/14/2018     Lab Results   Component Value Date    CHOLHDLRATIO 4.9 05/09/2015     Lab Results   Component Value Date    NHDL 139 08/14/2018       Is the patient on a Statin? NO             Is the patient on Aspirin? NO    Medications     Salicylates     ASPIRIN NOT PRESCRIBED, INTENTIONAL,             Last three blood pressure readings:  BP Readings from Last 3 Encounters:   07/19/19 110/86   04/01/19 138/70   03/20/19 134/80       Date of last diabetes office visit: 7/31/18     Tobacco History:     History   Smoking Status     Former Smoker     Types: Cigarettes, Dip, chew, snus or snuff   Smokeless Tobacco     Former User     Types: Chew     Quit date: 7/8/2007     Comment: smoked cigarettes but not a regular basis a long time ago now, occ chew-4 cans a month           Composite cancer screening  Chart review shows that this patient is due/due soon for the  following None  Summary:    Patient is due/failing the following:   A1C and LDL    Action needed:   Patient needs office visit for diabetes.    Type of outreach:    Phone, spoke to patient.  Patient states that he will call back to schedule appointment.    Questions for provider review:    None                                                                                                                                    Nandini De La Torre CMA       Chart routed to Care Team .

## 2020-01-06 ENCOUNTER — TELEPHONE (OUTPATIENT)
Dept: FAMILY MEDICINE | Facility: CLINIC | Age: 50
End: 2020-01-06

## 2020-01-06 NOTE — LETTER
[facility]  [address] [place] [id number] [phone]      January 22, 2020    Juan Pablo Abraham                                                                                                                     [address] [place] [id number]              Dear Juan Pablo Abraham,    At [facility] we care about your health and well-being. A review of your chart has indicated that you are due for a diabetic check and labs. Please contact us at [phone] to schedule your appointment.    If you have already had one or all of the above screening tests at another facility, please call us to update your chart.     You may contact the clinic at [phone] if you have any questions or concerns about this request.    Sincerely,    [facility] Care Staff/ ss

## 2020-01-06 NOTE — TELEPHONE ENCOUNTER
Panel Management Review      Patient has the following on his problem list:     Diabetes    ASA: Passed    Last A1C  Lab Results   Component Value Date    A1C 5.3 08/14/2018    A1C 6.0 06/24/2017    A1C 5.8 11/11/2016    A1C 6.0 03/03/2016    A1C 6.0 05/09/2015     A1C tested: Passed    Last LDL:    Lab Results   Component Value Date    CHOL 183 08/14/2018     Lab Results   Component Value Date    HDL 44 08/14/2018     Lab Results   Component Value Date     08/14/2018     Lab Results   Component Value Date    TRIG 69 08/14/2018     Lab Results   Component Value Date    CHOLHDLRATIO 4.9 05/09/2015     Lab Results   Component Value Date    NHDL 139 08/14/2018       Is the patient on a Statin? NO             Is the patient on Aspirin? YES    Medications     Salicylates     ASPIRIN NOT PRESCRIBED, INTENTIONAL,             Last three blood pressure readings:  BP Readings from Last 3 Encounters:   07/19/19 110/86   04/01/19 138/70   03/20/19 134/80       Date of last diabetes office visit: 7/19/19     Tobacco History:     History   Smoking Status     Former Smoker     Types: Cigarettes, Dip, chew, snus or snuff   Smokeless Tobacco     Former User     Types: Chew     Quit date: 7/8/2007     Comment: smoked cigarettes but not a regular basis a long time ago now, occ chew-4 cans a month           Composite cancer screening  Chart review shows that this patient is due/due soon for the following None  Summary:    Patient is due/failing the following:   A1C and ACT    Action needed:   Patient needs office visit for a diabetes and asthma check.    Type of outreach:    Phone, left message for patient to call back.     Questions for provider review:    None                                                                                                                                    Criss Toledo MA      Chart routed to Care Team .

## 2020-01-13 NOTE — TELEPHONE ENCOUNTER
Called and left message for patient to call clinic back. He needs to schedule a diabetes check with labs.    Criss Toledo MA

## 2020-01-17 NOTE — TELEPHONE ENCOUNTER
Called and left message for patient to call clinic back. He needs to schedule a diabetes check.    Criss Toledo MA

## 2020-02-10 ENCOUNTER — TELEPHONE (OUTPATIENT)
Dept: FAMILY MEDICINE | Facility: CLINIC | Age: 50
End: 2020-02-10

## 2020-02-10 NOTE — TELEPHONE ENCOUNTER
Panel Management Review      Patient has the following on his problem list:     Asthma review     ACT Total Scores 7/19/2019   ACT TOTAL SCORE -   ASTHMA ER VISITS -   ASTHMA HOSPITALIZATIONS -   ACT TOTAL SCORE (Goal Greater than or Equal to 20) 17   In the past 12 months, how many times did you visit the emergency room for your asthma without being admitted to the hospital? 0   In the past 12 months, how many times were you hospitalized overnight because of your asthma? 0      1. Is Asthma diagnosis on the Problem List? Yes    2. Is Asthma listed on Health Maintenance? Yes    3. Patient is due for:  ACT      Composite cancer screening  Chart review shows that this patient is due/due soon for the following None  Summary:    Patient is due/failing the following:   ACT    Action needed:   Patient needs to do ACT.    Type of outreach:    Copy of ACT mailed to patient, will reach out in 5 days.    Questions for provider review:    None                                                                                                                                    Criss Toledo MA      Chart routed to Care Team .

## 2020-03-03 ENCOUNTER — TELEPHONE (OUTPATIENT)
Dept: FAMILY MEDICINE | Facility: CLINIC | Age: 50
End: 2020-03-03

## 2020-03-03 NOTE — LETTER
[facility]  [address] [place] [id number] [phone]      March 3, 2020    Juan Pablo Abraham                                                                                                                     [address] [place] [id number]            Dear Juan Pablo Abraham,    At [facility] we care about your health and well-being. A review of your chart has indicated that you are due for a diabetes check and asthma follow up. Please contact us at [phone] to schedule your appointment.    If you have already had one or all of the above screening tests at another facility, please call us to update your chart.     You may contact the clinic at [phone] if you have any questions or concerns about this request.    Sincerely,    [facility] Care Staff/ ss

## 2020-03-03 NOTE — TELEPHONE ENCOUNTER
Panel Management Review      Patient has the following on his problem list:     Asthma review     ACT Total Scores 7/19/2019   ACT TOTAL SCORE -   ASTHMA ER VISITS -   ASTHMA HOSPITALIZATIONS -   ACT TOTAL SCORE (Goal Greater than or Equal to 20) 17   In the past 12 months, how many times did you visit the emergency room for your asthma without being admitted to the hospital? 0   In the past 12 months, how many times were you hospitalized overnight because of your asthma? 0      1. Is Asthma diagnosis on the Problem List? Yes    2. Is Asthma listed on Health Maintenance? Yes    3. Patient is due for:  ACT and AAP    Diabetes    ASA: Passed    Last A1C  Lab Results   Component Value Date    A1C 5.3 08/14/2018    A1C 6.0 06/24/2017    A1C 5.8 11/11/2016    A1C 6.0 03/03/2016    A1C 6.0 05/09/2015     A1C tested: Passed    Last LDL:    Lab Results   Component Value Date    CHOL 183 08/14/2018     Lab Results   Component Value Date    HDL 44 08/14/2018     Lab Results   Component Value Date     08/14/2018     Lab Results   Component Value Date    TRIG 69 08/14/2018     Lab Results   Component Value Date    CHOLHDLRATIO 4.9 05/09/2015     Lab Results   Component Value Date    NHDL 139 08/14/2018       Is the patient on a Statin? NO             Is the patient on Aspirin? YES    Medications     Salicylates     ASPIRIN NOT PRESCRIBED, INTENTIONAL,             Last three blood pressure readings:  BP Readings from Last 3 Encounters:   07/19/19 110/86   04/01/19 138/70   03/20/19 134/80       Date of last diabetes office visit: 7/19/19     Tobacco History:     History   Smoking Status     Former Smoker     Types: Cigarettes, Dip, chew, snus or snuff   Smokeless Tobacco     Former User     Types: Chew     Quit date: 7/8/2007     Comment: smoked cigarettes but not a regular basis a long time ago now, occ chew-4 cans a month           Composite cancer screening  Chart review shows that this patient is due/due soon for  the following None  Summary:    Patient is due/failing the following:   A1C, AAP and ACT    Action needed:   Patient needs office visit for a diabetes and asthma check.    Type of outreach:    Sent letter.    Questions for provider review:    None                                                                                                                                    Criss Toledo MA      Chart routed to Care Team .

## 2020-05-02 DIAGNOSIS — J45.40 MODERATE PERSISTENT ASTHMA WITHOUT COMPLICATION: ICD-10-CM

## 2020-05-04 NOTE — TELEPHONE ENCOUNTER
"Requested Prescriptions   Pending Prescriptions Disp Refills     albuterol (VENTOLIN HFA) 108 (90 Base) MCG/ACT inhaler [Pharmacy Med Name: VENTOLIN HFA 108MCG/ACT AERS]  Last Written Prescription Date:  7/19/19  Last Fill Quantity: 18,  # refills: 11   Last Office Visit with [facility] or [facility] prescribing provider:  7/19/19   Future Office Visit:      18 g 3     Sig: INHALE TWO PUFFS BY MOUTH EVERY FOUR HOURS AS NEEDED       Asthma Maintenance Inhalers - Anticholinergics Failed - 5/2/2020  7:23 PM        Failed - Asthma control assessment score within normal limits in last 6 months     Please review ACT score.           Failed - Recent (6 mo) or future (30 days) visit within the authorizing provider's specialty     Patient had office visit in the last 6 months or has a visit in the next 30 days with authorizing provider or within the authorizing provider's specialty.  See \"Patient Info\" tab in inbasket, or \"Choose Columns\" in Meds & Orders section of the refill encounter.            Passed - Patient is age 12 years or older        Passed - Medication is active on med list       Short-Acting Beta Agonist Inhalers Protocol  Failed - 5/2/2020  7:23 PM        Failed - Asthma control assessment score within normal limits in last 6 months     Please review ACT score.           Failed - Recent (6 mo) or future (30 days) visit within the authorizing provider's specialty     Patient had office visit in the last 6 months or has a visit in the next 30 days with authorizing provider or within the authorizing provider's specialty.  See \"Patient Info\" tab in inbasket, or \"Choose Columns\" in Meds & Orders section of the refill encounter.            Passed - Patient is age 12 or older        Passed - Medication is active on med list             "

## 2020-05-05 RX ORDER — ALBUTEROL SULFATE 90 UG/1
AEROSOL, METERED RESPIRATORY (INHALATION)
Qty: 18 G | Refills: 3 | OUTPATIENT
Start: 2020-05-05

## 2020-05-20 DIAGNOSIS — J45.40 MODERATE PERSISTENT ASTHMA WITHOUT COMPLICATION: ICD-10-CM

## 2020-05-20 RX ORDER — ALBUTEROL SULFATE 90 UG/1
AEROSOL, METERED RESPIRATORY (INHALATION)
Qty: 18 G | Refills: 0 | Status: SHIPPED | OUTPATIENT
Start: 2020-05-20 | End: 2020-06-11

## 2020-06-10 DIAGNOSIS — J45.40 MODERATE PERSISTENT ASTHMA WITHOUT COMPLICATION: ICD-10-CM

## 2020-06-11 DIAGNOSIS — J45.40 MODERATE PERSISTENT ASTHMA WITHOUT COMPLICATION: ICD-10-CM

## 2020-06-11 RX ORDER — ALBUTEROL SULFATE 90 UG/1
AEROSOL, METERED RESPIRATORY (INHALATION)
Qty: 18 G | Refills: 1 | Status: SHIPPED | OUTPATIENT
Start: 2020-06-11 | End: 2020-07-06

## 2020-06-11 NOTE — TELEPHONE ENCOUNTER
"Routing refill request to provider for review/approval because:  Over due for asthma recheck.  ACT overdue.    ACT Total Scores 5/26/2018 3/20/2019 7/19/2019   ACT TOTAL SCORE - - -   ASTHMA ER VISITS - - -   ASTHMA HOSPITALIZATIONS - - -   ACT TOTAL SCORE (Goal Greater than or Equal to 20) 21 21 17   In the past 12 months, how many times did you visit the emergency room for your asthma without being admitted to the hospital? 0 0 0   In the past 12 months, how many times were you hospitalized overnight because of your asthma? 0 0 0           Requested Prescriptions   Pending Prescriptions Disp Refills     albuterol (VENTOLIN HFA) 108 (90 Base) MCG/ACT inhaler [Pharmacy Med Name: VENTOLIN HFA 108MCG/ACT AERS] 18 g 0     Sig: INHALE 2 PUFFS INTO THE LUNGS EVERY 4 HOURS AS NEEDED       Asthma Maintenance Inhalers - Anticholinergics Failed - 6/10/2020  1:13 PM        Failed - Asthma control assessment score within normal limits in last 6 months     Please review ACT score.           Failed - Recent (6 mo) or future (30 days) visit within the authorizing provider's specialty     Patient had office visit in the last 6 months or has a visit in the next 30 days with authorizing provider or within the authorizing provider's specialty.  See \"Patient Info\" tab in inbasket, or \"Choose Columns\" in Meds & Orders section of the refill encounter.            Passed - Patient is age 12 years or older        Passed - Medication is active on med list       Short-Acting Beta Agonist Inhalers Protocol  Failed - 6/10/2020  1:13 PM        Failed - Asthma control assessment score within normal limits in last 6 months     Please review ACT score.           Failed - Recent (6 mo) or future (30 days) visit within the authorizing provider's specialty     Patient had office visit in the last 6 months or has a visit in the next 30 days with authorizing provider or within the authorizing provider's specialty.  See \"Patient Info\" tab in inbasket, " "or \"Choose Columns\" in Meds & Orders section of the refill encounter.            Passed - Patient is age 12 or older        Passed - Medication is active on med list             "

## 2020-06-12 RX ORDER — BUDESONIDE AND FORMOTEROL FUMARATE DIHYDRATE 160; 4.5 UG/1; UG/1
AEROSOL RESPIRATORY (INHALATION)
Qty: 10.2 G | Refills: 5 | Status: SHIPPED | OUTPATIENT
Start: 2020-06-12 | End: 2020-07-06

## 2020-06-12 NOTE — TELEPHONE ENCOUNTER
"Requested Prescriptions   Pending Prescriptions Disp Refills     budesonide-formoterol (SYMBICORT) 160-4.5 MCG/ACT Inhaler [Pharmacy Med Name: SYMBICORT 160-4.5MCG/ACT AERO] 10.2 g 5     Sig: INHALE TWO PUFFS BY MOUTH INTO THE LUNGS TWICE A DAY       Inhaled Steroids Protocol Failed - 6/11/2020  4:32 PM        Failed - Asthma control assessment score within normal limits in last 6 months     Please review ACT score.           Failed - Recent (6 mo) or future (30 days) visit within the authorizing provider's specialty     Patient had office visit in the last 6 months or has a visit in the next 30 days with authorizing provider or within the authorizing provider's specialty.  See \"Patient Info\" tab in inbasket, or \"Choose Columns\" in Meds & Orders section of the refill encounter.            Passed - Patient is age 12 or older        Passed - Medication is active on med list       Long-Acting Beta Agonist Inhalers Protocol  Failed - 6/11/2020  4:32 PM        Failed - Asthma control assessment score within normal limits in last 6 months     Please review ACT score.           Failed - Order for Serevent, Striverdi, or Foradil and pt has steroid inhaler        Failed - Recent (6 mo) or future (30 days) visit within the authorizing provider's specialty     Patient had office visit in the last 6 months or has a visit in the next 30 days with authorizing provider or within the authorizing provider's specialty.  See \"Patient Info\" tab in inbasket, or \"Choose Columns\" in Meds & Orders section of the refill encounter.            Passed - Patient is age 12 or older        Passed - Medication is active on med list           ACT Total Scores 5/26/2018 3/20/2019 7/19/2019   ACT TOTAL SCORE - - -   ASTHMA ER VISITS - - -   ASTHMA HOSPITALIZATIONS - - -   ACT TOTAL SCORE (Goal Greater than or Equal to 20) 21 21 17   In the past 12 months, how many times did you visit the emergency room for your asthma without being admitted to the " hospital? 0 0 0   In the past 12 months, how many times were you hospitalized overnight because of your asthma? 0 0 0     Last Written Prescription Date:  7/19/2019  Last Fill Quantity: 10.2g,  # refills: 11   Last office visit: 7/19/2019 with prescribing provider:  Laina   Future Office Visit:

## 2020-07-06 ENCOUNTER — OFFICE VISIT (OUTPATIENT)
Dept: FAMILY MEDICINE | Facility: CLINIC | Age: 50
End: 2020-07-06
Payer: COMMERCIAL

## 2020-07-06 VITALS
WEIGHT: 221.8 LBS | OXYGEN SATURATION: 97 % | SYSTOLIC BLOOD PRESSURE: 140 MMHG | RESPIRATION RATE: 16 BRPM | DIASTOLIC BLOOD PRESSURE: 96 MMHG | BODY MASS INDEX: 27.36 KG/M2 | TEMPERATURE: 98.2 F | HEART RATE: 75 BPM

## 2020-07-06 DIAGNOSIS — I10 ESSENTIAL HYPERTENSION: ICD-10-CM

## 2020-07-06 DIAGNOSIS — Z12.11 SPECIAL SCREENING FOR MALIGNANT NEOPLASMS, COLON: Primary | ICD-10-CM

## 2020-07-06 DIAGNOSIS — J45.40 MODERATE PERSISTENT ASTHMA WITHOUT COMPLICATION: ICD-10-CM

## 2020-07-06 DIAGNOSIS — Z23 NEED FOR VACCINATION: ICD-10-CM

## 2020-07-06 LAB
ANION GAP SERPL CALCULATED.3IONS-SCNC: 5 MMOL/L (ref 3–14)
BUN SERPL-MCNC: 12 MG/DL (ref 7–30)
CALCIUM SERPL-MCNC: 9.4 MG/DL (ref 8.5–10.1)
CHLORIDE SERPL-SCNC: 106 MMOL/L (ref 94–109)
CO2 SERPL-SCNC: 25 MMOL/L (ref 20–32)
CREAT SERPL-MCNC: 0.79 MG/DL (ref 0.66–1.25)
GFR SERPL CREATININE-BSD FRML MDRD: >90 ML/MIN/{1.73_M2}
GLUCOSE SERPL-MCNC: 95 MG/DL (ref 70–99)
POTASSIUM SERPL-SCNC: 3.5 MMOL/L (ref 3.4–5.3)
SODIUM SERPL-SCNC: 136 MMOL/L (ref 133–144)

## 2020-07-06 PROCEDURE — 36415 COLL VENOUS BLD VENIPUNCTURE: CPT | Performed by: NURSE PRACTITIONER

## 2020-07-06 PROCEDURE — 90714 TD VACC NO PRESV 7 YRS+ IM: CPT | Performed by: NURSE PRACTITIONER

## 2020-07-06 PROCEDURE — 90471 IMMUNIZATION ADMIN: CPT | Performed by: NURSE PRACTITIONER

## 2020-07-06 PROCEDURE — 99214 OFFICE O/P EST MOD 30 MIN: CPT | Mod: 25 | Performed by: NURSE PRACTITIONER

## 2020-07-06 PROCEDURE — 80048 BASIC METABOLIC PNL TOTAL CA: CPT | Performed by: NURSE PRACTITIONER

## 2020-07-06 RX ORDER — BUDESONIDE AND FORMOTEROL FUMARATE DIHYDRATE 160; 4.5 UG/1; UG/1
AEROSOL RESPIRATORY (INHALATION)
Qty: 10.2 G | Refills: 11 | Status: SHIPPED | OUTPATIENT
Start: 2020-07-06 | End: 2021-04-22

## 2020-07-06 RX ORDER — LOSARTAN POTASSIUM 100 MG/1
100 TABLET ORAL DAILY
Qty: 90 TABLET | Refills: 3 | Status: SHIPPED | OUTPATIENT
Start: 2020-07-06 | End: 2021-04-22

## 2020-07-06 RX ORDER — NIFEDIPINE 30 MG/1
30 TABLET, EXTENDED RELEASE ORAL DAILY
Qty: 90 TABLET | Refills: 3 | Status: SHIPPED | OUTPATIENT
Start: 2020-07-06 | End: 2021-04-22

## 2020-07-06 RX ORDER — PREDNISONE 20 MG/1
20 TABLET ORAL 2 TIMES DAILY
Qty: 10 TABLET | Refills: 0 | Status: SHIPPED | OUTPATIENT
Start: 2020-07-06 | End: 2021-04-22

## 2020-07-06 RX ORDER — ALBUTEROL SULFATE 90 UG/1
AEROSOL, METERED RESPIRATORY (INHALATION)
Qty: 18 G | Refills: 11 | Status: SHIPPED | OUTPATIENT
Start: 2020-07-06 | End: 2021-04-11

## 2020-07-06 NOTE — PROGRESS NOTES
Subjective     Juan Pablo Abraham is a 50 year old male who presents to clinic today for the following health issues:    HPI   Asthma Follow-Up    Was ACT completed today?    Yes    ACT Total Scores 7/6/2020   ACT TOTAL SCORE -   ASTHMA ER VISITS -   ASTHMA HOSPITALIZATIONS -   ACT TOTAL SCORE (Goal Greater than or Equal to 20) 18   In the past 12 months, how many times did you visit the emergency room for your asthma without being admitted to the hospital? 0   In the past 12 months, how many times were you hospitalized overnight because of your asthma? 0       How many days per week do you miss taking your asthma controller medication?  I do not have an asthma controller medication    Please describe any recent triggers for your asthma: Humidity     Have you had any Emergency Room Visits, Urgent Care Visits, or Hospital Admissions since your last office visit?  No  Hypertension Follow-up      Do you check your blood pressure regularly outside of the clinic? Yes     Are you following a low salt diet? No    Are your blood pressures ever more than 140 on the top number (systolic) OR more   than 90 on the bottom number (diastolic), for example 140/90? No        Reviewed and updated as needed this visit by Provider         Review of Systems   Constitutional, HEENT, cardiovascular, pulmonary, gi and gu systems are negative, except as otherwise noted.      Objective    BP (!) 140/96 (BP Location: Right arm, Patient Position: Sitting, Cuff Size: Adult Large)   Pulse 75   Temp 98.2  F (36.8  C) (Tympanic)   Resp 16   Wt 100.6 kg (221 lb 12.8 oz)   SpO2 97%   BMI 27.36 kg/m    Body mass index is 27.36 kg/m .  Physical Exam   GENERAL: healthy, alert and no distress  EYES: Eyes grossly normal to inspection, PERRL and conjunctivae and sclerae normal  RESP: mild anterior upper wheezing, otherwise clear   CV: regular rate and rhythm, normal S1 S2, no S3 or S4, no murmur, click or rub, no peripheral edema and peripheral pulses  strong  PSYCH: mentation appears normal, affect normal/bright    Diagnostic Test Results:  Labs reviewed in Epic        Assessment & Plan     1. Special screening for malignant neoplasms, colon    - Fecal colorectal cancer screen (FIT); Future    2. Moderate persistent asthma without complication  -slightly uncontrolled, have been using Albuterol more frequently recently due to hot weather and humidity, provided with prednisone for current flare  - albuterol (VENTOLIN HFA) 108 (90 Base) MCG/ACT inhaler; INHALE 2 PUFFS INTO THE LUNGS EVERY 4 HOURS AS NEEDED, follow up for future refills  Dispense: 18 g; Refill: 11  - budesonide-formoterol (SYMBICORT) 160-4.5 MCG/ACT Inhaler; INHALE TWO PUFFS BY MOUTH INTO THE LUNGS TWICE A DAY  Dispense: 10.2 g; Refill: 11  - predniSONE (DELTASONE) 20 MG tablet; Take 1 tablet (20 mg) by mouth 2 times daily  Dispense: 10 tablet; Refill: 0    3. 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 Need for vaccination    - TD PRSERV FREE >=7 YRS ADS IM [44758]  - 1st  Administration  [[id number]]           See Patient Instructions    DOE Hernandez [facility]

## 2020-07-07 ASSESSMENT — ASTHMA QUESTIONNAIRES: ACT_TOTALSCORE: 18

## 2020-07-20 ENCOUNTER — APPOINTMENT (OUTPATIENT)
Dept: FAMILY MEDICINE | Facility: CLINIC | Age: 50
End: 2020-07-20
Payer: COMMERCIAL

## 2020-07-20 ENCOUNTER — VIRTUAL VISIT (OUTPATIENT)
Dept: FAMILY MEDICINE | Facility: OTHER | Age: 50
End: 2020-07-20
Payer: COMMERCIAL

## 2020-07-20 PROCEDURE — 99421 OL DIG E/M SVC 5-10 MIN: CPT | Performed by: FAMILY MEDICINE

## 2020-07-20 NOTE — PROGRESS NOTES
"Date: 2020 08:54:06  Clinician: Foster Tao  Clinician NPI: [id number]  Patient: john walls  Patient : 1970  Patient Address: [address] [place] [address] [address] [id number]  Patient Phone: [phone]  Visit Protocol: URI  Patient Summary:  john is a 50 year old ( : 1970 ) male who initiated a Visit for COVID-19 (Coronavirus) evaluation and screening. When asked the question \"Please sign me up to receive news, health information and promotions from [employer].\", john responded \"No\".    john states his symptoms started gradually 7-9 days ago.   His symptoms consist of wheezing and a cough. He is experiencing mild difficulty breathing with activities but can speak normally in full sentences.   Symptom details     Cough: john coughs a few times an hour and his cough is not more bothersome at night. Phlegm comes into his throat when he coughs. He does not believe his cough is caused by post-nasal drip. The color of the phlegm is yellow.     Wheezing: john has been diagnosed with asthma. Additional wheezing details as reported by the patient (free text): I have asthma but it seems worse than normal        john denies having nausea, teeth pain, ageusia, diarrhea, vomiting, rhinitis, malaise, ear pain, headache, chills, sore throat, myalgias, anosmia, facial pain or pressure, fever, and nasal congestion. He also denies having recent facial or sinus surgery in the past 60 days, taking antibiotic medication in the past month, and double sickening (worsening symptoms after initial improvement).   Precipitating events  He has not recently been exposed to someone with influenza. john has been in close contact with the following high risk individuals: people with asthma, heart disease or diabetes.   Pertinent COVID-19 (Coronavirus) information  In the past 14 days, john has not worked in a congregate living setting.   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 He provides direct patient care. 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 He lives with a healthcare worker.   john has had a close contact with a laboratory-confirmed COVID-19 patient within 14 days of symptom onset. He was exposed at his work. Additional information about contact with COVID-19 (Coronavirus) patient as reported by the patient (free text): dont have dates i just need to be tested   Pertinent medical history  john does not need a return to work/school note.   Weight: 220 lbs   john does not smoke or use smokeless tobacco.   Weight: 220 lbs    MEDICATIONS: losartan oral, nifedipine oral, Symbicort inhalation, albuterol sulfate inhalation, ALLERGIES: NKDA  Clinician Response:  Dear john,   Your symptoms show that you may have coronavirus (COVID-19). This illness can cause fever, cough and trouble breathing. Many people get a mild case and get better on their own. Some people can get very sick.  What should I do?  We would like to test you for this virus.   1. Please call [phone] to schedule your visit. Explain that you were referred by [facility] to have a COVID-19 test. Be ready to share your [facility] visit ID number.  The following will serve as your written order for this COVID Test, ordered by me, for the indication of suspected COVID [Z20.828]: The test will be ordered in [employer], our electronic health record, after you are scheduled. It will show as ordered and authorized by Shaggy Lopez MD.  Order: COVID-19 (Coronavirus) PCR for SYMPTOMATIC testing from [facility].      2. When it's time for your COVID test:  Stay at least 6 feet away from others. (If someone will drive you to your test, stay in the backseat, as far away from the  as you  "can.)   Cover your mouth and nose with a mask, tissue or washcloth.  Go straight to the testing site. Don't make any stops on the way there or back.      3.Starting now: Stay home and away from others (self-isolate) until:   You've had no fever---and no medicine that reduces fever---for 3 full days (72 hours). And...   Your other symptoms have gotten better. For example, your cough or breathing has improved. And...   At least 10 days have passed since your symptoms started.       During this time, don't leave the house except for testing or medical care.   Stay in your own room, even for meals. Use your own bathroom if you can.   Stay away from others in your home. No hugging, kissing or shaking hands. No visitors.  Don't go to work, school or anywhere else.    Clean \"high touch\" surfaces often (doorknobs, counters, handles, etc.). Use a household cleaning spray or wipes. You'll find a full list of  on the EPA website: www.epa.gov/pesticide-registration/list-n-disinfectants-use-against-sars-cov-2.   Cover your mouth and nose with a mask, tissue or washcloth to avoid spreading germs.  Wash your hands and face often. Use soap and water.  Caregivers in these groups are at risk for severe illness due to COVID-19:  o People 65 years and older  o People who live in a nursing home or long-term care facility  o People with chronic disease (lung, heart, cancer, diabetes, kidney, liver, immunologic)  o People who have a weakened immune system, including those who:   Are in cancer treatment  Take medicine that weakens the immune system, such as corticosteroids  Had a bone marrow or organ transplant  Have an immune deficiency  Have poorly controlled HIV or AIDS  Are obese (body mass index of 40 or higher)  Smoke regularly   o Caregivers should wear gloves while washing dishes, handling laundry and cleaning bedrooms and bathrooms.  o Use caution when washing and drying laundry: Don't shake dirty laundry, and use the " warmest water setting that you can.  o For more tips, go to www.cdc.gov/coronavirus/2019-ncov/downloads/10Things.pdf.    4.Sign up for [employer]. We know it's scary to hear that you might have COVID-19. We want to track your symptoms to make sure you're okay over the next 2 weeks. Please look for an email from [employer]---this is a free, online program that we'll use to keep in touch. To sign up, follow the link in the email. Learn more at [email or web address]  How can I take care of myself?   Get lots of rest. Drink extra fluids (unless a doctor has told you not to).   Take Tylenol (acetaminophen) for fever or pain. If you have liver or kidney problems, ask your family doctor if it's okay to take Tylenol.   Adults can take either:    650 mg (two 325 mg pills) every 4 to 6 hours, or...   1,000 mg (two 500 mg pills) every 8 hours as needed.    Note: Don't take more than 3,000 mg in one day. Acetaminophen is found in many medicines (both prescribed and over-the-counter medicines). Read all labels to be sure you don't take too much.   For children, check the Tylenol bottle for the right dose. The dose is based on the child's age or weight.    If you have other health problems (like cancer, heart failure, an organ transplant or severe kidney disease): Call your specialty clinic if you don't feel better in the next 2 days.       Know when to call 911. Emergency warning signs include:    Trouble breathing or shortness of breath Pain or pressure in the chest that doesn't go away Feeling confused like you haven't felt before, or not being able to wake up Bluish-colored lips or face.  Where can I get more information?    [facility] -- About COVID-19: [email or web address]   CDC -- What to Do If You're Sick: www.cdc.gov/coronavirus/2019-ncov/about/steps-when-sick.html   CDC -- Ending Home Isolation: www.cdc.gov/coronavirus/2019-ncov/hcp/disposition-in-home-patients.html   CDC -- Caring for  Someone: www.cdc.gov/coronavirus/2019-ncov/if-you-are-sick/care-for-someone.html   [facility] -- Interim Guidance for Hospital Discharge to Home: www.health.Counts include 234 beds at the [facility].mn.[place]/diseases/coronavirus/hcp/hospdischarge.pdf   [facility] clinical trials (COVID-19 research studies): clinicalaffairs.[facility]-clinical-trials    Below are the COVID-19 hotlines at the [place] Department of Health ([facility]). Interpreters are available.    For health questions: Call 463-669-9495 or 1-618.995.1646 (7 a.m. to 7 p.m.) For questions about schools and childcare: Call 381-971-7321 or 1-831.695.8293 (7 a.m. to 7 p.m.)    Diagnosis: Cough  Diagnosis ICD: R05

## 2020-10-08 ENCOUNTER — TELEPHONE (OUTPATIENT)
Dept: FAMILY MEDICINE | Facility: CLINIC | Age: 50
End: 2020-10-08

## 2020-10-08 DIAGNOSIS — E11.9 TYPE 2 DIABETES MELLITUS WITHOUT COMPLICATION, WITHOUT LONG-TERM CURRENT USE OF INSULIN (H): Primary | ICD-10-CM

## 2020-10-08 NOTE — TELEPHONE ENCOUNTER
ATN PCP: review Statin medication (if not necessary add on med list statin not indicate)    Panel Management Review      Patient has the following on his problem list:     Diabetes    ASA: Passed    Last A1C  Lab Results   Component Value Date    A1C 5.3 08/14/2018    A1C 6.0 06/24/2017    A1C 5.8 11/11/2016    A1C 6.0 03/03/2016    A1C 6.0 05/09/2015     A1C tested: FAILED    Last LDL:    Lab Results   Component Value Date    CHOL 183 08/14/2018     Lab Results   Component Value Date    HDL 44 08/14/2018     Lab Results   Component Value Date     08/14/2018     Lab Results   Component Value Date    TRIG 69 08/14/2018     Lab Results   Component Value Date    CHOLHDLRATIO 4.9 05/09/2015     Lab Results   Component Value Date    NHDL 139 08/14/2018       Is the patient on a Statin? NO             Is the patient on Aspirin? YES    Medications     Salicylates     ASPIRIN NOT PRESCRIBED, INTENTIONAL,             Last three blood pressure readings:  BP Readings from Last 3 Encounters:   07/06/20 (!) 140/96   07/19/19 110/86   04/01/19 138/70       Date of last diabetes office visit: 814/18     Tobacco History:     History   Smoking Status     Former Smoker     Types: Cigarettes, Dip, chew, snus or snuff   Smokeless Tobacco     Former User     Types: Chew     Quit date: 7/8/2007     Comment: smoked cigarettes but not a regular basis a long time ago now, occ chew-4 cans a month           Composite cancer screening  Chart review shows that this patient is due/due soon for the following None  Summary:    Patient is due/failing the following:   A1C, LDL  Action needed:   Patient needs office visit for fasting labs + DM follow up - as long labs are done PCP okay with Virtual/Phone or face to face.    Type of outreach:    Sent [employer] message.    Questions for provider review:    None                                                                                                                                    Vania  KIMBERLY Alford (JACKLYN)   (aka: Marcia Alford)       Chart routed to Care Team .

## 2020-11-19 ENCOUNTER — TELEPHONE (OUTPATIENT)
Dept: FAMILY MEDICINE | Facility: CLINIC | Age: 50
End: 2020-11-19

## 2020-11-19 NOTE — LETTER
November 19, 2020      Juan Pablo Abraham  [address] [address]        Dear Juan Pablo,     Your healthcare team cares about your health. To provide you with the best care, we have reviewed your chart and based on our findings, we see that you are due for:   An Asthma Control Test (ACT) that our clinic uses to monitor and manage your asthma. This test is an assessment tool that we use to determine how well your asthma is controlled.  Please complete the enclosed form and return in the provided envelope.  Thank you for choosing [facility] for your healthcare needs. For any questions, concerns, or to schedule an appointment please contact the clinic.   Healthy Regards,    Your [facility] Care Team

## 2020-11-19 NOTE — TELEPHONE ENCOUNTER
Patient Quality Outreach Summary      Summary:    Patient is due/failing the following:   ACT needed    Type of outreach:    Sent letter.    Questions for provider review:    None                                                                                                                    FRIDA Faria MA

## 2020-11-29 ENCOUNTER — HEALTH MAINTENANCE LETTER (OUTPATIENT)
Age: 50
End: 2020-11-29

## 2021-02-04 ENCOUNTER — TELEPHONE (OUTPATIENT)
Dept: FAMILY MEDICINE | Facility: CLINIC | Age: 51
End: 2021-02-04

## 2021-02-24 ENCOUNTER — TELEPHONE (OUTPATIENT)
Dept: INTERNAL MEDICINE | Facility: CLINIC | Age: 51
End: 2021-02-24

## 2021-02-24 NOTE — TELEPHONE ENCOUNTER
Patient Quality Outreach Summary      Summary:    Patient is due/failing the following:   BP check    Type of outreach:    Sent [employer] message.    Questions for provider review:    None                                                                                                                    Vania Alford CMA (JACKLYN)   (aka: Marcia Alford)       Chart routed to Care Team.

## 2021-02-24 NOTE — LETTER
March 4, 2021      Juan Pablo Abraham  [address] [address]        Dear Juan Pablo,       Your [facility] Care Team works hard to make sure that you  receive exceptional care. Enclosed you will find a copy of the Asthma Control Test (ACT) that our clinic uses to monitor and manage your asthma. This test is an assessment tool that we can use to determine how well your asthma is controlled. Please fill out and mail back the enclosed ACT form. Enclosed is a stamped addressed envelope for you to mail the ACT back to the clinic in. Thank You .     Also, Dear Juan Pablo,    Your most recent blood pressure reading preformed at our clinic was higher than we like to see it. The goal is to have it under 140/90 in clinic. Please call our clinic [phone] to schedule a blood pressure recheck on our RN schedule. This appointment is free of charge and takes about 15 minutes to complete. Be sure to take all of your blood pressure medications, and avoid stimulants like caffeine, cold medicines, sudafed, or tobacco prior to your recheck.    If your blood pressure medication was changed by your provider recently wait 2 weeks before making this recheck appointment.     Thank you for trusting us with your health care.      Sincerely,        DOE Hernandez CNP           [phone]

## 2021-03-03 NOTE — TELEPHONE ENCOUNTER
(1st attempt) Left a voice msg for pt to call back.  Whenever he calls back schedule BP recheck with RN.  Vania Alford CMA ([facility])   (aka: Marcia Alford)

## 2021-03-04 NOTE — TELEPHONE ENCOUNTER
Patient Quality Outreach Summary      Summary:    Patient is due/failing the following:   ACT needed and BP check    Type of outreach:    Sent letter.    Questions for provider review:    None                                                                                                                    Cresencio FRANKLIN CMA

## 2021-04-08 DIAGNOSIS — J45.40 MODERATE PERSISTENT ASTHMA WITHOUT COMPLICATION: ICD-10-CM

## 2021-04-11 RX ORDER — ALBUTEROL SULFATE 90 UG/1
AEROSOL, METERED RESPIRATORY (INHALATION)
Qty: 18 G | Refills: 0 | Status: SHIPPED | OUTPATIENT
Start: 2021-04-11 | End: 2021-04-22

## 2021-04-22 ENCOUNTER — OFFICE VISIT (OUTPATIENT)
Dept: FAMILY MEDICINE | Facility: CLINIC | Age: 51
End: 2021-04-22
Payer: COMMERCIAL

## 2021-04-22 VITALS
HEART RATE: 86 BPM | TEMPERATURE: 98.5 F | DIASTOLIC BLOOD PRESSURE: 102 MMHG | OXYGEN SATURATION: 98 % | BODY MASS INDEX: 26.61 KG/M2 | SYSTOLIC BLOOD PRESSURE: 130 MMHG | HEIGHT: 75 IN | WEIGHT: 214 LBS

## 2021-04-22 DIAGNOSIS — Z87.898 HISTORY OF PREDIABETES: ICD-10-CM

## 2021-04-22 DIAGNOSIS — K21.9 GASTROESOPHAGEAL REFLUX DISEASE WITHOUT ESOPHAGITIS: ICD-10-CM

## 2021-04-22 DIAGNOSIS — Z00.00 ANNUAL PHYSICAL EXAM: Primary | ICD-10-CM

## 2021-04-22 DIAGNOSIS — G89.29 CHRONIC BILATERAL LOW BACK PAIN WITHOUT SCIATICA: ICD-10-CM

## 2021-04-22 DIAGNOSIS — I10 ESSENTIAL HYPERTENSION: ICD-10-CM

## 2021-04-22 DIAGNOSIS — Z12.11 COLON CANCER SCREENING: ICD-10-CM

## 2021-04-22 DIAGNOSIS — J45.40 MODERATE PERSISTENT ASTHMA WITHOUT COMPLICATION: ICD-10-CM

## 2021-04-22 DIAGNOSIS — M54.50 CHRONIC BILATERAL LOW BACK PAIN WITHOUT SCIATICA: ICD-10-CM

## 2021-04-22 PROCEDURE — 99396 PREV VISIT EST AGE 40-64: CPT | Performed by: NURSE PRACTITIONER

## 2021-04-22 PROCEDURE — 99214 OFFICE O/P EST MOD 30 MIN: CPT | Mod: 25 | Performed by: NURSE PRACTITIONER

## 2021-04-22 RX ORDER — BUDESONIDE AND FORMOTEROL FUMARATE DIHYDRATE 160; 4.5 UG/1; UG/1
AEROSOL RESPIRATORY (INHALATION)
Qty: 10.2 G | Refills: 11 | Status: SHIPPED | OUTPATIENT
Start: 2021-04-22 | End: 2022-05-20

## 2021-04-22 RX ORDER — NIFEDIPINE 30 MG/1
30 TABLET, EXTENDED RELEASE ORAL DAILY
Qty: 90 TABLET | Refills: 3 | Status: SHIPPED | OUTPATIENT
Start: 2021-04-22 | End: 2022-07-29

## 2021-04-22 RX ORDER — LOSARTAN POTASSIUM 100 MG/1
100 TABLET ORAL DAILY
Qty: 90 TABLET | Refills: 3 | Status: SHIPPED | OUTPATIENT
Start: 2021-04-22 | End: 2022-08-30

## 2021-04-22 RX ORDER — ALBUTEROL SULFATE 90 UG/1
AEROSOL, METERED RESPIRATORY (INHALATION)
Qty: 18 G | Refills: 11 | Status: SHIPPED | OUTPATIENT
Start: 2021-04-22 | End: 2022-02-04

## 2021-04-22 ASSESSMENT — MIFFLIN-ST. JEOR: SCORE: 1916.33

## 2021-04-22 NOTE — PROGRESS NOTES
SUBJECTIVE:   CC: Juan Pablo Abraham is an 50 year old male who presents for preventative health visit.     Chief Complaint   Patient presents with     Physical     Refill Request     Needs all of his meds refilled      Blood Draw     A1C, PSA, Lipid        Patient has been advised of split billing requirements and indicates understanding: Yes  Healthy Habits:    Getting at least 3 servings of Calcium per day:  Yes    Bi-annual eye exam:  NO    Dental care twice a year:  NO    Sleep apnea or symptoms of sleep apnea:  None    Diet:  Regular (no restrictions)    Frequency of exercise:  None    Duration of exercise:  N/A    Taking medications regularly:  Yes    Barriers to taking medications:  None    Medication side effects:  None    PHQ-2 Total Score:    Additional concerns today:  Yes (Bilateral lower back pain  for years- has seen PT in the past and it was helpful. )      Today's PHQ-2 Score:   PHQ-2 ( 1999 Pfizer) 7/6/2020   Q1: Little interest or pleasure in doing things 0   Q2: Feeling down, depressed or hopeless 0   PHQ-2 Score 0       Abuse: Current or Past(Physical, Sexual or Emotional)- No  Do you feel safe in your environment? Yes    Have you ever done Advance Care Planning? (For example, a Health Directive, POLST, or a discussion with a medical provider or your loved ones about your wishes): No, advance care planning information given to patient to review.  Patient declined advance care planning discussion at this time.    Social History     Tobacco Use     Smoking status: Former Smoker     Types: Cigarettes, Dip, chew, snus or snuff     Smokeless tobacco: Former User     Types: Chew     Quit date: 7/8/2007     Tobacco comment: smoked cigarettes but not a regular basis a long time ago now, occ chew-4 cans a month   Substance Use Topics     Alcohol use: Yes     Alcohol/week: 0.0 standard drinks     Comment: 1 case a week     Last PSA: No results found for: PSA    Reviewed orders with patient. Reviewed health  "maintenance and updated orders accordingly - Yes  Labs reviewed in EPIC  BP Readings from Last 3 Encounters:   04/22/21 (!) 130/102   07/06/20 (!) 140/96   07/19/19 110/86    Wt Readings from Last 3 Encounters:   04/22/21 97.1 kg (214 lb)   07/06/20 100.6 kg (221 lb 12.8 oz)   07/19/19 101.6 kg (223 lb 14.4 oz)            Reviewed and updated as needed this visit by clinical staff  Tobacco  Allergies  Meds  Problems  Med Hx  Surg Hx  Fam Hx  Soc Hx          Reviewed and updated as needed this visit by Provider     Problems                Review of Systems  CONSTITUTIONAL: NEGATIVE for fever, chills, change in weight  INTEGUMENTARY/SKIN: NEGATIVE for worrisome rashes, moles or lesions  EYES: NEGATIVE for vision changes or irritation  ENT: NEGATIVE for ear, mouth and throat problems  RESP: NEGATIVE for significant cough or SOB  CV: NEGATIVE for chest pain, palpitations or peripheral edema  GI: NEGATIVE for nausea, abdominal pain, heartburn, or change in bowel habits   male: negative for dysuria, hematuria, decreased urinary stream, erectile dysfunction, urethral discharge  MUSCULOSKELETAL: NEGATIVE for significant arthralgias or myalgia. Positive for chronic lower back pain   NEURO: NEGATIVE for weakness, dizziness or paresthesias  PSYCHIATRIC: NEGATIVE for changes in mood or affect    OBJECTIVE:   BP (!) 130/102 (BP Location: Right arm, Patient Position: Sitting, Cuff Size: Adult Large)   Pulse 86   Temp 98.5  F (36.9  C) (Tympanic)   Ht 1.905 m (6' 3\")   Wt 97.1 kg (214 lb)   SpO2 98%   BMI 26.75 kg/m      Physical Exam  GENERAL: healthy, alert and no distress  EYES: Eyes grossly normal to inspection, PERRL and conjunctivae and sclerae normal  HENT: ear canals and TM's normal, nose and mouth without ulcers or lesions  NECK: no adenopathy, no asymmetry, masses, or scars and thyroid normal to palpation  RESP: lungs clear to auscultation - no rales, rare soft expiratory wheezes left lower posterior "   CV: regular rate and rhythm, normal S1 S2, no S3 or S4, no murmur, click or rub, no peripheral edema and peripheral pulses strong  MS: no gross musculoskeletal defects noted, no edema  SKIN: no suspicious lesions or rashes  NEURO: Normal strength and tone, mentation intact and speech normal  PSYCH: mentation appears normal, affect normal/bright    Labs pending, patient will schedule lab appointment, fasting.     ASSESSMENT/PLAN:   1. Annual physical exam    - Basic metabolic panel FUTURE anytime; Future  - PSA, screen; Future  - Lipid panel reflex to direct LDL Fasting; Future    2. Colon cancer screening    - GASTROENTEROLOGY ADULT REF PROCEDURE ONLY; Future    3. History of prediabetes  -  Hemoglobin A1C   Date Value Ref Range Status   08/14/2018 5.3 0 - 5.6 % Final     Comment:     Normal <5.7% Prediabetes 5.7-6.4%  Diabetes 6.5% or higher - adopted from ADA   consensus guidelines.       - A1C FUTURE anytime; Future    4. Moderate persistent asthma without complication  -stable, well controlled   - albuterol (PROAIR HFA/PROVENTIL HFA/VENTOLIN HFA) 108 (90 Base) MCG/ACT inhaler; INHALE 2 PUFFS INTO THE LUNGS EVERY 4 HOURS AS NEEDED, follow up for future refills  Dispense: 18 g; Refill: 11  - budesonide-formoterol (SYMBICORT) 160-4.5 MCG/ACT Inhaler; INHALE TWO PUFFS BY MOUTH INTO THE LUNGS TWICE A DAY  Dispense: 10.2 g; Refill: 11    5. Essential hypertension  -uncontrolled  -recommended to monitor BP at home, if still over 140/90 will plan to start low dose hydrochlorothiazide 12.5 mg daily   - losartan (COZAAR) 100 MG tablet; Take 1 tablet (100 mg) by mouth daily  Dispense: 90 tablet; Refill: 3  - NIFEdipine ER OSMOTIC (PROCARDIA XL) 30 MG 24 hr tablet; Take 1 tablet (30 mg) by mouth daily  Dispense: 90 tablet; Refill: 3    6. Gastroesophageal reflux disease without esophagitis    - omeprazole (PRILOSEC) 20 MG DR capsule; Take 1 capsule (20 mg) by mouth daily  Dispense: 90 capsule; Refill: 3    7. Chronic  "bilateral low back pain without sciatica    - PHYSICAL THERAPY REFERRAL; Future    Patient has been advised of split billing requirements and indicates understanding: Yes  COUNSELING:   Reviewed preventive health counseling, as reflected in patient instructions       Colon cancer screening       Prostate cancer screening    Estimated body mass index is 26.75 kg/m  as calculated from the following:    Height as of this encounter: 1.905 m (6' 3\").    Weight as of this encounter: 97.1 kg (214 lb).         He reports that he has quit smoking. His smoking use included cigarettes and dip, chew, snus or snuff. He quit smokeless tobacco use about 13 years ago.  His smokeless tobacco use included chew.      Counseling Resources:  ATP IV Guidelines  Pooled Cohorts Equation Calculator  FRAX Risk Assessment  ICSI Preventive Guidelines  Dietary Guidelines for Americans, 2010  USDA's MyPlate  ASA Prophylaxis  Lung CA Screening    DOE Hernandez [facility]  "

## 2021-04-23 ASSESSMENT — ASTHMA QUESTIONNAIRES: ACT_TOTALSCORE: 20

## 2021-04-28 DIAGNOSIS — Z00.00 ANNUAL PHYSICAL EXAM: ICD-10-CM

## 2021-04-28 DIAGNOSIS — Z87.898 HISTORY OF PREDIABETES: ICD-10-CM

## 2021-04-28 LAB
ANION GAP SERPL CALCULATED.3IONS-SCNC: 6 MMOL/L (ref 3–14)
BUN SERPL-MCNC: 15 MG/DL (ref 7–30)
CALCIUM SERPL-MCNC: 9.5 MG/DL (ref 8.5–10.1)
CHLORIDE SERPL-SCNC: 107 MMOL/L (ref 94–109)
CHOLEST SERPL-MCNC: 205 MG/DL
CO2 SERPL-SCNC: 23 MMOL/L (ref 20–32)
CREAT SERPL-MCNC: 0.76 MG/DL (ref 0.66–1.25)
GFR SERPL CREATININE-BSD FRML MDRD: >90 ML/MIN/{1.73_M2}
GLUCOSE SERPL-MCNC: 111 MG/DL (ref 70–99)
HBA1C MFR BLD: 5.7 % (ref 0–5.6)
HDLC SERPL-MCNC: 37 MG/DL
LDLC SERPL CALC-MCNC: 145 MG/DL
NONHDLC SERPL-MCNC: 168 MG/DL
POTASSIUM SERPL-SCNC: 3.6 MMOL/L (ref 3.4–5.3)
PSA SERPL-ACNC: 2.32 UG/L (ref 0–4)
SODIUM SERPL-SCNC: 136 MMOL/L (ref 133–144)
TRIGL SERPL-MCNC: 114 MG/DL

## 2021-04-28 PROCEDURE — 83036 HEMOGLOBIN GLYCOSYLATED A1C: CPT | Performed by: NURSE PRACTITIONER

## 2021-04-28 PROCEDURE — 36415 COLL VENOUS BLD VENIPUNCTURE: CPT | Performed by: NURSE PRACTITIONER

## 2021-04-28 PROCEDURE — 80048 BASIC METABOLIC PNL TOTAL CA: CPT | Performed by: NURSE PRACTITIONER

## 2021-04-28 PROCEDURE — 80061 LIPID PANEL: CPT | Performed by: NURSE PRACTITIONER

## 2021-04-28 PROCEDURE — G0103 PSA SCREENING: HCPCS | Performed by: NURSE PRACTITIONER

## 2021-06-24 NOTE — LETTER
-June 2014: Presentation with a ST elevation MI, PCI and ultimately bypass surgery  -No typical symptoms or evidence of angina or heart failure CABG x 4 LIMA to LAD, SVG to Diagonal, OM, and posterolateral branch on 6/5/14.  -No recent symptoms.  -He remains on statin but continues to smoke  -Trial of welbutrin   [facility]  [address] [place] [id number] [phone]      February 10, 2020    Juan Pabol Abraham                                                                                                                     [address] [place] [id number]            Dear Juan Pablo,    At [facility] we care about your health and well-being. A review of your chart has indicated that you are due for an Asthma Control Test. For copyright reasons we have attached a copy of the questionnaire. Please complete the questions and mail them back to the clinic in the provided envelope.    You may contact the clinic at [phone] if you have any questions or concerns about this request.        Sincerely,     [facility] Care Staff/ ss

## 2021-07-27 NOTE — LETTER
My Asthma Action Plan  Name: Juan Pablo Abraham   YOB: 1970  Date: 6/23/2017   My doctor: John Langford MD   My clinic: [facility]        My Control Medicine: Budesonide + formoterol (Symbicort) -  160/4.5 mcg 2 puff twice daily  My Rescue Medicine: Albuterol (Proair/Ventolin/Proventil) inhaler as needed   My Asthma Severity: moderate persistent  Avoid your asthma triggers: dust mites, exercise or sports and cold air  None            GREEN ZONE   Good Control    I feel good    No cough or wheeze    Can work, sleep and play without asthma symptoms       Take your asthma control medicine every day.     1. If exercise triggers your asthma, take your rescue medication    15 minutes before exercise or sports, and    During exercise if you have asthma symptoms  2. Spacer to use with inhaler: If you have a spacer, make sure to use it with your inhaler             YELLOW ZONE Getting Worse  I have ANY of these:    I do not feel good    Cough or wheeze    Chest feels tight    Wake up at night   1. Keep taking your Green Zone medications  2. Start taking your rescue medicine:    every 20 minutes for up to 1 hour. Then every 4 hours for 24-48 hours.  3. If you stay in the Yellow Zone for more than 12-24 hours, contact your doctor.  4. If you do not return to the Green Zone in 12-24 hours or you get worse, start taking your oral steroid medicine if prescribed by your provider.           RED ZONE Medical Alert - Get Help  I have ANY of these:    I feel awful    Medicine is not helping    Breathing getting harder    Trouble walking or talking    Nose opens wide to breathe       1. Take your rescue medicine NOW  2. If your provider has prescribed an oral steroid medicine, start taking it NOW  3. Call your doctor NOW  4. If you are still in the Red Zone after 20 minutes and you have not reached your doctor:    Take your rescue medicine again and    Call 911 or go to the emergency room right away    See  your regular doctor within 2 weeks of an Emergency Room or Urgent Care visit for follow-up treatment.        Electronically signed by: John Langford, June 23, 2017    Annual Reminders:  Meet with Asthma Educator,  Flu Shot in the Fall, consider Pneumonia Vaccination for patients with asthma (aged 19 and older).    Pharmacy:    [facility], [place] - [address] [facility], [place] - [address] [place] [address]                    Asthma Triggers  How To Control Things That Make Your Asthma Worse    Triggers are things that make your asthma worse.  Look at the list below to help you find your triggers and what you can do about them.  You can help prevent asthma flare-ups by staying away from your triggers.      Trigger                                                          What you can do   Cigarette Smoke  Tobacco smoke can make asthma worse. Do not allow smoking in your home, car or around you.  Be sure no one smokes at a child s day care or school.  If you smoke, ask your health care provider for ways to help you quit.  Ask family members to quit too.  Ask your health care provider for a referral to Quit Plan to help you quit smoking, or call 6-657-047-PLAN.     Colds, Flu, Bronchitis  These are common triggers of asthma. Wash your hands often.  Don t touch your eyes, nose or mouth.  Get a flu shot every year.     Dust Mites  These are tiny bugs that live in cloth or carpet. They are too small to see. Wash sheets and blankets in hot water every week.   Encase pillows and mattress in dust mite proof covers.  Avoid having carpet if you can. If you have carpet, vacuum weekly.   Use a dust mask and HEPA vacuum.   Pollen and Outdoor Mold  Some people are allergic to trees, grass, or weed pollen, or molds. Try to keep your windows closed.  Limit time out doors when pollen count is high.   Ask you health care provider about taking medicine during allergy season.     Animal  Dander  Some people are allergic to skin flakes, urine or saliva from pets with fur or feathers. Keep pets with fur or feathers out of your home.    If you can t keep the pet outdoors, then keep the pet out of your bedroom.  Keep the bedroom door closed.  Keep pets off cloth furniture and away from stuffed toys.     Mice, Rats, and Cockroaches  Some people are allergic to the waste from these pests.   Cover food and garbage.  Clean up spills and food crumbs.  Store grease in the refrigerator.   Keep food out of the bedroom.   Indoor Mold  This can be a trigger if your home has high moisture. Fix leaking faucets, pipes, or other sources of water.   Clean moldy surfaces.  Dehumidify basement if it is damp and smelly.   Smoke, Strong Odors, and Sprays  These can reduce air quality. Stay away from strong odors and sprays, such as perfume, powder, hair spray, paints, smoke incense, paint, cleaning products, candles and new carpet.   Exercise or Sports  Some people with asthma have this trigger. Be active!  Ask your doctor about taking medicine before sports or exercise to prevent symptoms.    Warm up for 5-10 minutes before and after sports or exercise.     Other Triggers of Asthma  Cold air:  Cover your nose and mouth with a scarf.  Sometimes laughing or crying can be a trigger.  Some medicines and food can trigger asthma.      Size Of Lesion: .6 Detail Level: Detailed Size Of Lesion: 1.2x 1.2

## 2021-09-19 ENCOUNTER — HEALTH MAINTENANCE LETTER (OUTPATIENT)
Age: 51
End: 2021-09-19

## 2021-11-14 ENCOUNTER — HEALTH MAINTENANCE LETTER (OUTPATIENT)
Age: 51
End: 2021-11-14

## 2021-12-29 ENCOUNTER — TELEPHONE (OUTPATIENT)
Dept: FAMILY MEDICINE | Facility: CLINIC | Age: 51
End: 2021-12-29
Payer: COMMERCIAL

## 2021-12-29 NOTE — TELEPHONE ENCOUNTER
Patient Quality Outreach    Patient is due for the following:   Colon Cancer Screening -  Colonoscopy    NEXT STEPS:   Needs to schedule a colooscopy    Type of outreach:    Sent letter.      Questions for provider review:    None     Renetta Russell CMA

## 2022-02-03 ENCOUNTER — TELEPHONE (OUTPATIENT)
Dept: FAMILY MEDICINE | Facility: CLINIC | Age: 52
End: 2022-02-03
Payer: COMMERCIAL

## 2022-02-03 DIAGNOSIS — J45.40 MODERATE PERSISTENT ASTHMA WITHOUT COMPLICATION: ICD-10-CM

## 2022-02-04 NOTE — TELEPHONE ENCOUNTER
Forwarding refill request for albuterol to PCP, as patient overdue for 6 month asthma visit and ACT-last was 4/22/21 with score of 20. Also, a year supply was refilled at that time.   Reached out to patient, who states he's taken the medication this way for 20 years, and always runs short before annual visit.  He's requesting a couple more inhalers to get him to April when due for annual visit.  He would like pharmacy changed to [place].  Rx pended.     Kadie Pruett RN  [facility]

## 2022-02-07 RX ORDER — ALBUTEROL SULFATE 90 UG/1
AEROSOL, METERED RESPIRATORY (INHALATION)
Qty: 18 G | Refills: 1 | Status: SHIPPED | OUTPATIENT
Start: 2022-02-07 | End: 2022-07-29

## 2022-02-07 NOTE — TELEPHONE ENCOUNTER
This would not allow me to change pharmacies to [facility].  Call and transfer if patient is preferring this pharmacy change.  DENISSE Kerr

## 2022-02-09 NOTE — TELEPHONE ENCOUNTER
Spoke with diana in pharmacy. Pt picked up med.  Will need to call [facility] to tx Rx.    Lilliana Saul RN

## 2022-05-20 DIAGNOSIS — J45.40 MODERATE PERSISTENT ASTHMA WITHOUT COMPLICATION: ICD-10-CM

## 2022-05-20 RX ORDER — BUDESONIDE AND FORMOTEROL FUMARATE DIHYDRATE 160; 4.5 UG/1; UG/1
AEROSOL RESPIRATORY (INHALATION)
Qty: 10.2 G | Refills: 8 | Status: SHIPPED | OUTPATIENT
Start: 2022-05-20 | End: 2022-09-07

## 2022-05-20 NOTE — TELEPHONE ENCOUNTER
"Requested Prescriptions   Pending Prescriptions Disp Refills     budesonide-formoterol (SYMBICORT) 160-4.5 MCG/ACT Inhaler [Pharmacy Med Name: SYMBICORT 160-4.5MCG/ACT AERO] 10.2 g 8     Sig: INHALE TWO PUFFS INTO THE LUNGS TWICE A DAY       Inhaled Steroids Protocol Failed - 5/20/2022  9:33 AM        Failed - Asthma control assessment score within normal limits in last 6 months     Please review ACT score.           Failed - Recent (6 mo) or future (30 days) visit within the authorizing provider's specialty     Patient had office visit in the last 6 months or has a visit in the next 30 days with authorizing provider or within the authorizing provider's specialty.  See \"Patient Info\" tab in inbasket, or \"Choose Columns\" in Meds & Orders section of the refill encounter.            Passed - Patient is age 12 or older        Passed - Medication is active on med list       Long-Acting Beta Agonist Inhalers Protocol  Failed - 5/20/2022  9:33 AM        Failed - Asthma control assessment score within normal limits in last 6 months     Please review ACT score.           Failed - Recent (6 mo) or future (30 days) visit within the authorizing provider's specialty     Patient had office visit in the last 6 months or has a visit in the next 30 days with authorizing provider or within the authorizing provider's specialty.  See \"Patient Info\" tab in inbasket, or \"Choose Columns\" in Meds & Orders section of the refill encounter.            Passed - Patient is age 12 or older        Passed - Medication is active on med list           "

## 2022-06-26 ENCOUNTER — HEALTH MAINTENANCE LETTER (OUTPATIENT)
Age: 52
End: 2022-06-26

## 2022-07-29 DIAGNOSIS — J45.40 MODERATE PERSISTENT ASTHMA WITHOUT COMPLICATION: ICD-10-CM

## 2022-07-29 RX ORDER — ALBUTEROL SULFATE 90 UG/1
AEROSOL, METERED RESPIRATORY (INHALATION)
Qty: 8.5 G | Refills: 0 | Status: SHIPPED | OUTPATIENT
Start: 2022-07-29 | End: 2022-09-07

## 2022-07-29 NOTE — TELEPHONE ENCOUNTER
Covering for primary/ordering provider  Short refill given.  Needs an appointment with primary care provider for ongoing refills

## 2022-07-29 NOTE — TELEPHONE ENCOUNTER
"Requested Prescriptions   Pending Prescriptions Disp Refills    albuterol (PROAIR HFA/PROVENTIL HFA/VENTOLIN HFA) 108 (90 Base) MCG/ACT inhaler [Pharmacy Med Name: ALBUTEROL SULFATE  AERS] 8.5 g 0     Sig: INHALE 2 PUFFS INTO THE LUNGS EVERY 4 HOURS AS NEEDED, FOLLOW UP FOR FUTURE REFILLS        Asthma Maintenance Inhalers - Anticholinergics Failed - 7/29/2022  8:38 AM        Failed - Asthma control assessment score within normal limits in last 6 months     Please review ACT score.           Failed - Recent (6 mo) or future (30 days) visit within the authorizing provider's specialty     Patient had office visit in the last 6 months or has a visit in the next 30 days with authorizing provider or within the authorizing provider's specialty.  See \"Patient Info\" tab in inbasket, or \"Choose Columns\" in Meds & Orders section of the refill encounter.            Passed - Patient is age 12 years or older        Passed - Medication is active on med list       Short-Acting Beta Agonist Inhalers Protocol  Failed - 7/29/2022  8:38 AM        Failed - Asthma control assessment score within normal limits in last 6 months     Please review ACT score.           Failed - Recent (6 mo) or future (30 days) visit within the authorizing provider's specialty     Patient had office visit in the last 6 months or has a visit in the next 30 days with authorizing provider or within the authorizing provider's specialty.  See \"Patient Info\" tab in inbasket, or \"Choose Columns\" in Meds & Orders section of the refill encounter.            Passed - Patient is age 12 or older        Passed - Medication is active on med list              "

## 2022-08-26 DIAGNOSIS — I10 ESSENTIAL HYPERTENSION: ICD-10-CM

## 2022-08-29 NOTE — TELEPHONE ENCOUNTER
"Requested Prescriptions   Pending Prescriptions Disp Refills    losartan (COZAAR) 100 MG tablet [Pharmacy Med Name: LOSARTAN POTASSIUM 100MG TABS] 90 tablet 2     Sig: Take 1 tablet (100 mg) by mouth daily        Angiotensin-II Receptors Failed - 8/26/2022  9:05 AM        Failed - Last blood pressure under 140/90 in past 12 months       BP Readings from Last 3 Encounters:   04/22/21 (!) 130/102   07/06/20 (!) 140/96   07/19/19 110/86                 Failed - Normal serum creatinine on file in past 12 months     Recent Labs   Lab Test 04/28/21  0707   CR 0.76       Ok to refill medication if creatinine is low          Failed - Normal serum potassium on file in past 12 months       Recent Labs   Lab Test 04/28/21  0707   POTASSIUM 3.6                    Passed - Recent (12 mo) or future (30 days) visit within the authorizing provider's specialty     Patient has had an office visit with the authorizing provider or a provider within the authorizing providers department within the previous 12 mos or has a future within next 30 days. See \"Patient Info\" tab in inbasket, or \"Choose Columns\" in Meds & Orders section of the refill encounter.              Passed - Medication is active on med list        Passed - Patient is age 18 or older              "

## 2022-08-30 RX ORDER — LOSARTAN POTASSIUM 100 MG/1
100 TABLET ORAL DAILY
Qty: 90 TABLET | Refills: 0 | Status: SHIPPED | OUTPATIENT
Start: 2022-08-30 | End: 2022-09-07

## 2022-09-07 ENCOUNTER — OFFICE VISIT (OUTPATIENT)
Dept: FAMILY MEDICINE | Facility: CLINIC | Age: 52
End: 2022-09-07
Payer: COMMERCIAL

## 2022-09-07 VITALS
BODY MASS INDEX: 26.36 KG/M2 | WEIGHT: 212 LBS | HEART RATE: 65 BPM | HEIGHT: 75 IN | DIASTOLIC BLOOD PRESSURE: 88 MMHG | SYSTOLIC BLOOD PRESSURE: 132 MMHG | TEMPERATURE: 98.3 F | OXYGEN SATURATION: 98 % | RESPIRATION RATE: 16 BRPM

## 2022-09-07 DIAGNOSIS — Z12.11 SCREEN FOR COLON CANCER: ICD-10-CM

## 2022-09-07 DIAGNOSIS — Z11.4 SCREENING FOR HIV (HUMAN IMMUNODEFICIENCY VIRUS): ICD-10-CM

## 2022-09-07 DIAGNOSIS — K21.9 GASTROESOPHAGEAL REFLUX DISEASE WITHOUT ESOPHAGITIS: ICD-10-CM

## 2022-09-07 DIAGNOSIS — E11.9 TYPE 2 DIABETES MELLITUS WITHOUT COMPLICATION, WITHOUT LONG-TERM CURRENT USE OF INSULIN (H): Primary | ICD-10-CM

## 2022-09-07 DIAGNOSIS — E78.5 HYPERLIPIDEMIA LDL GOAL <100: ICD-10-CM

## 2022-09-07 DIAGNOSIS — J45.40 MODERATE PERSISTENT ASTHMA WITHOUT COMPLICATION: ICD-10-CM

## 2022-09-07 DIAGNOSIS — Z11.59 NEED FOR HEPATITIS C SCREENING TEST: ICD-10-CM

## 2022-09-07 DIAGNOSIS — I10 ESSENTIAL HYPERTENSION: ICD-10-CM

## 2022-09-07 PROCEDURE — 99207 PR FOOT EXAM NO CHARGE: CPT | Performed by: FAMILY MEDICINE

## 2022-09-07 PROCEDURE — 90472 IMMUNIZATION ADMIN EACH ADD: CPT | Performed by: FAMILY MEDICINE

## 2022-09-07 PROCEDURE — 90682 RIV4 VACC RECOMBINANT DNA IM: CPT | Performed by: FAMILY MEDICINE

## 2022-09-07 PROCEDURE — 99214 OFFICE O/P EST MOD 30 MIN: CPT | Mod: 25 | Performed by: FAMILY MEDICINE

## 2022-09-07 PROCEDURE — 90471 IMMUNIZATION ADMIN: CPT | Performed by: FAMILY MEDICINE

## 2022-09-07 PROCEDURE — 90677 PCV20 VACCINE IM: CPT | Performed by: FAMILY MEDICINE

## 2022-09-07 RX ORDER — BUDESONIDE AND FORMOTEROL FUMARATE DIHYDRATE 160; 4.5 UG/1; UG/1
AEROSOL RESPIRATORY (INHALATION)
Qty: 10 G | Refills: 11 | Status: SHIPPED | OUTPATIENT
Start: 2022-09-07 | End: 2023-09-19

## 2022-09-07 RX ORDER — LOSARTAN POTASSIUM 100 MG/1
100 TABLET ORAL DAILY
Qty: 90 TABLET | Refills: 3 | Status: SHIPPED | OUTPATIENT
Start: 2022-09-07 | End: 2023-07-12

## 2022-09-07 RX ORDER — NIFEDIPINE 30 MG/1
30 TABLET, EXTENDED RELEASE ORAL DAILY
Qty: 90 TABLET | Refills: 3 | Status: SHIPPED | OUTPATIENT
Start: 2022-09-07 | End: 2023-07-12

## 2022-09-07 RX ORDER — ALBUTEROL SULFATE 90 UG/1
1-2 AEROSOL, METERED RESPIRATORY (INHALATION) EVERY 6 HOURS PRN
Qty: 18 G | Refills: 11 | Status: SHIPPED | OUTPATIENT
Start: 2022-09-07 | End: 2023-07-25

## 2022-09-07 RX ORDER — ATORVASTATIN CALCIUM 20 MG/1
20 TABLET, FILM COATED ORAL DAILY
Qty: 90 TABLET | Refills: 3 | Status: SHIPPED | OUTPATIENT
Start: 2022-09-07 | End: 2023-07-12

## 2022-09-07 ASSESSMENT — ASTHMA QUESTIONNAIRES
QUESTION_4 LAST FOUR WEEKS HOW OFTEN HAVE YOU USED YOUR RESCUE INHALER OR NEBULIZER MEDICATION (SUCH AS ALBUTEROL): ONE OR TWO TIMES PER DAY
QUESTION_1 LAST FOUR WEEKS HOW MUCH OF THE TIME DID YOUR ASTHMA KEEP YOU FROM GETTING AS MUCH DONE AT WORK, SCHOOL OR AT HOME: A LITTLE OF THE TIME
ACT_TOTALSCORE: 17
QUESTION_2 LAST FOUR WEEKS HOW OFTEN HAVE YOU HAD SHORTNESS OF BREATH: THREE TO SIX TIMES A WEEK
QUESTION_3 LAST FOUR WEEKS HOW OFTEN DID YOUR ASTHMA SYMPTOMS (WHEEZING, COUGHING, SHORTNESS OF BREATH, CHEST TIGHTNESS OR PAIN) WAKE YOU UP AT NIGHT OR EARLIER THAN USUAL IN THE MORNING: NOT AT ALL
ACT_TOTALSCORE: 17
QUESTION_5 LAST FOUR WEEKS HOW WOULD YOU RATE YOUR ASTHMA CONTROL: SOMEWHAT CONTROLLED

## 2022-09-07 ASSESSMENT — PAIN SCALES - GENERAL: PAINLEVEL: NO PAIN (0)

## 2022-09-07 NOTE — PATIENT INSTRUCTIONS
Lab work this Friday    Continue current medications.     Start on lipitor 20 mg daily.   Check labs in 3 months for cholesterol     Schedule colonoscopy.

## 2022-09-07 NOTE — PROGRESS NOTES
Assessment & Plan     Type 2 diabetes mellitus without complication, without long-term current use of insulin (H)  Doing well, diet controlled.  Continue on losartan.  We will start patient on Lipitor 20 mg daily.  - Lipid panel reflex to direct LDL Fasting  - Hemoglobin A1c  - Albumin Random Urine Quantitative with Creat Ratio  - FOOT EXAM    Essential hypertension  Blood pressure 132/88 today in clinic.  Discussed diastolic at upper end of normal.  Advised to continue current medication.  Monitor at home.  He is currently symptomatic.  - losartan (COZAAR) 100 MG tablet  Dispense: 90 tablet; Refill: 3  - NIFEdipine ER OSMOTIC (PROCARDIA XL) 30 MG 24 hr tablet  Dispense: 90 tablet; Refill: 3  - Comprehensive metabolic panel (BMP + Alb, Alk Phos, ALT, AST, Total. Bili, TP)    Hyperlipidemia LDL goal <100  Elevated ASCVD risk score and diabetic.  Start on Lipitor 20 mg daily.  Check fasting lipids in 2 days and fasting lipids in 3 months.  - atorvastatin (LIPITOR) 20 MG tablet  Dispense: 90 tablet; Refill: 3  - Lipid panel reflex to direct LDL Fasting    Moderate persistent asthma without complication  Currently on max dose of budesonide-formoterol.  Discussed other inhaler regimen such as adding on Spiriva and like to hold off at this time and continue current cares.  - albuterol (PROAIR HFA/PROVENTIL HFA/VENTOLIN HFA) 108 (90 Base) MCG/ACT inhaler  Dispense: 18 g; Refill: 11  - budesonide-formoterol (SYMBICORT) 160-4.5 MCG/ACT Inhaler  Dispense: 10 g; Refill: 11    Gastroesophageal reflux disease without esophagitis  Stable, no issues.  Refill provided.  - omeprazole (PRILOSEC) 20 MG DR capsule  Dispense: 90 capsule; Refill: 3    Screening for HIV (human immunodeficiency virus)  - HIV Antigen Antibody Combo    Need for hepatitis C screening test  - Hepatitis C Screen Reflex to HCV RNA Quant and Genotype    Screen for colon cancer  - Colonoscopy Screening  Referral    The risks, benefits and treatment  options of prescribed medications or other treatments have been discussed with the patient. The patient verbalized their understanding and should call or follow up if no improvement or if they develop further problems.    Follow up in 6 months for diabetic check.       Kamron Munroe, [facility]    Rajesh Almeida is a 52 year old, presenting for the following health issues:  Asthma, Diabetes, Lipids, and Hypertension      History of Present Illness       Reason for visit:  Routine    He eats 2-3 servings of fruits and vegetables daily.He exercises with enough effort to increase his heart rate 10 to 19 minutes per day.  He exercises with enough effort to increase his heart rate 3 or less days per week. He is missing 1 dose(s) of medications per week.       Diabetes Follow-up      How often are you checking your blood sugar? Not at all    What concerns do you have today about your diabetes? None     Do you have any of these symptoms? (Select all that apply)  Blurry vision    Have you had a diabetic eye exam in the last 12 months? No    A1c of 6.7 in 2013.   Diet controlled   No issues. Has bene under good control.     BP Readings from Last 2 Encounters:   09/07/22 132/88   04/22/21 (!) 130/102     Hemoglobin A1C (%)   Date Value   04/28/2021 5.7 (H)   08/14/2018 5.3     LDL Cholesterol Calculated (mg/dL)   Date Value   04/28/2021 145 (H)   08/14/2018 125 (H)               Hypertension Follow-up      Do you check your blood pressure regularly outside of the clinic? Yes     Are you following a low salt diet? No    Are your blood pressures ever more than 140 on the top number (systolic) OR more   than 90 on the bottom number (diastolic), for example 140/90? No    Losartan 100 mg daily nifedipine 30 mg daily      The 10-year ASCVD risk score (Maurepasyarelis ESTRADA Jr., et al., 2013) is: 13.4%    Values used to calculate the score:      Age: 52 years      Sex: Male      Is Non- :  "No      Diabetic: Yes      Tobacco smoker: No      Systolic Blood Pressure: 132 mmHg      Is BP treated: Yes      HDL Cholesterol: 37 mg/dL      Total Cholesterol: 205 mg/dL    Discussed elevated ASCVD risk score of 12%.  Patient wanted to start on cholesterol-lowering medication.      Asthma    Since childhood.   symbicort two puffs twice daily.   Using albuterol a couple times per day.   Not interested in changing inhalers.     Reflux  On omeprazole 20 mg daily, will use intermittently.   Asymptomatic.       Review of Systems   Constitutional, HEENT, cardiovascular, pulmonary, gi and gu systems are negative, except as otherwise noted.      Objective    /88   Pulse 65   Temp 98.3  F (36.8  C) (Tympanic)   Resp 16   Ht 1.91 m (6' 3.2\")   Wt 96.2 kg (212 lb)   SpO2 98%   BMI 26.36 kg/m    Body mass index is 26.36 kg/m .  Physical Exam   General: alert, cooperative, no acute distress   CV: RRR, no murmur  Resp: non-labored breathing, clear to auscultation, no wheezing or rales   Abdomen: Soft, non-tender, no guarding.   Extremities: No peripheral edema, calves non-tender.  Monofilament testing down 10 bilaterally.  No open sores or lesions.        "

## 2022-09-09 ENCOUNTER — LAB (OUTPATIENT)
Dept: LAB | Facility: CLINIC | Age: 52
End: 2022-09-09
Payer: COMMERCIAL

## 2022-09-09 DIAGNOSIS — Z11.59 NEED FOR HEPATITIS C SCREENING TEST: ICD-10-CM

## 2022-09-09 DIAGNOSIS — E11.9 TYPE 2 DIABETES MELLITUS WITHOUT COMPLICATION, WITHOUT LONG-TERM CURRENT USE OF INSULIN (H): ICD-10-CM

## 2022-09-09 DIAGNOSIS — Z11.4 SCREENING FOR HIV (HUMAN IMMUNODEFICIENCY VIRUS): ICD-10-CM

## 2022-09-09 DIAGNOSIS — I10 ESSENTIAL HYPERTENSION: ICD-10-CM

## 2022-09-09 LAB
ALBUMIN SERPL BCG-MCNC: 3.6 G/DL (ref 3.5–5.2)
ALP SERPL-CCNC: 115 U/L (ref 40–129)
ALT SERPL W P-5'-P-CCNC: 52 U/L (ref 10–50)
ANION GAP SERPL CALCULATED.3IONS-SCNC: 12 MMOL/L (ref 7–15)
AST SERPL W P-5'-P-CCNC: 42 U/L (ref 10–50)
BILIRUB SERPL-MCNC: 0.3 MG/DL
BUN SERPL-MCNC: 18 MG/DL (ref 6–20)
CALCIUM SERPL-MCNC: 9.7 MG/DL (ref 8.6–10)
CHLORIDE SERPL-SCNC: 107 MMOL/L (ref 98–107)
CHOLEST SERPL-MCNC: 145 MG/DL
CREAT SERPL-MCNC: 0.89 MG/DL (ref 0.67–1.17)
DEPRECATED HCO3 PLAS-SCNC: 20 MMOL/L (ref 22–29)
GFR SERPL CREATININE-BSD FRML MDRD: >90 ML/MIN/1.73M2
GLUCOSE SERPL-MCNC: 115 MG/DL (ref 70–99)
HBA1C MFR BLD: 5.7 % (ref 0–5.6)
HCV AB SERPL QL IA: NONREACTIVE
HDLC SERPL-MCNC: 32 MG/DL
LDLC SERPL CALC-MCNC: 96 MG/DL
NONHDLC SERPL-MCNC: 113 MG/DL
POTASSIUM SERPL-SCNC: 3.9 MMOL/L (ref 3.4–5.3)
PROT SERPL-MCNC: 7.7 G/DL (ref 6.4–8.3)
SODIUM SERPL-SCNC: 139 MMOL/L (ref 136–145)
TRIGL SERPL-MCNC: 87 MG/DL

## 2022-09-09 PROCEDURE — 36415 COLL VENOUS BLD VENIPUNCTURE: CPT

## 2022-09-09 PROCEDURE — 86803 HEPATITIS C AB TEST: CPT

## 2022-09-09 PROCEDURE — 87389 HIV-1 AG W/HIV-1&-2 AB AG IA: CPT

## 2022-09-09 PROCEDURE — 80053 COMPREHEN METABOLIC PANEL: CPT

## 2022-09-09 PROCEDURE — 80061 LIPID PANEL: CPT

## 2022-09-09 PROCEDURE — 83036 HEMOGLOBIN GLYCOSYLATED A1C: CPT

## 2022-09-12 DIAGNOSIS — R74.01 ELEVATED ALT MEASUREMENT: Primary | ICD-10-CM

## 2022-09-12 LAB — HIV 1+2 AB+HIV1 P24 AG SERPL QL IA: NONREACTIVE

## 2023-02-21 ENCOUNTER — TELEPHONE (OUTPATIENT)
Dept: FAMILY MEDICINE | Facility: CLINIC | Age: 53
End: 2023-02-21

## 2023-02-21 NOTE — LETTER
February 21, 2023      Juan Pablo Abraham  [address] [address]      Your healthcare team cares about your health. To provide you with the best care, we have reviewed your chart and based on our findings, we see that you are due to:     PREVENTATIVE VISIT: Physical    If you have already completed these items, please contact the clinic via phone or Mychart so your care team can review and update your records.  Thank you for choosing [facility] for your healthcare needs. For any questions, concerns, or to schedule an appointment please contact the clinic.       Healthy Regards,      Your [facility] Care Team

## 2023-02-21 NOTE — TELEPHONE ENCOUNTER
Patient Quality Outreach    Patient is due for the following:   Colon Cancer Screening  Physical Preventive Adult Physical    Next Steps:   Schedule a Adult Preventative    Type of outreach:    Sent [facility] message.      Questions for provider review:    None     Nola Arnold, [facility]

## 2023-04-11 ENCOUNTER — TELEPHONE (OUTPATIENT)
Dept: FAMILY MEDICINE | Facility: CLINIC | Age: 53
End: 2023-04-11
Payer: COMMERCIAL

## 2023-04-11 NOTE — TELEPHONE ENCOUNTER
Patient Quality Outreach    Patient is due for the following:   Diabetes -  A1C, Eye Exam, Microalbumin and Diabetic Follow-Up Visit  Asthma  -  ACT needed  Colon Cancer Screening    Next Steps:   Schedule a office visit for diabetes    Type of outreach:    Phone, spoke to patient/parent. schduled appointment.      Questions for provider review:    None     Nola Arnold, [facility]

## 2023-04-16 ENCOUNTER — HEALTH MAINTENANCE LETTER (OUTPATIENT)
Age: 53
End: 2023-04-16

## 2023-04-25 ENCOUNTER — OFFICE VISIT (OUTPATIENT)
Dept: FAMILY MEDICINE | Facility: CLINIC | Age: 53
End: 2023-04-25
Payer: COMMERCIAL

## 2023-04-25 VITALS
TEMPERATURE: 99.2 F | RESPIRATION RATE: 28 BRPM | BODY MASS INDEX: 24.97 KG/M2 | WEIGHT: 200.8 LBS | DIASTOLIC BLOOD PRESSURE: 72 MMHG | HEART RATE: 124 BPM | HEIGHT: 75 IN | OXYGEN SATURATION: 98 % | SYSTOLIC BLOOD PRESSURE: 128 MMHG

## 2023-04-25 DIAGNOSIS — F10.10 ALCOHOL ABUSE: Primary | ICD-10-CM

## 2023-04-25 PROCEDURE — 99213 OFFICE O/P EST LOW 20 MIN: CPT | Performed by: FAMILY MEDICINE

## 2023-04-25 ASSESSMENT — ANXIETY QUESTIONNAIRES
GAD7 TOTAL SCORE: 5
5. BEING SO RESTLESS THAT IT IS HARD TO SIT STILL: NOT AT ALL
GAD7 TOTAL SCORE: 5
7. FEELING AFRAID AS IF SOMETHING AWFUL MIGHT HAPPEN: NOT AT ALL
6. BECOMING EASILY ANNOYED OR IRRITABLE: SEVERAL DAYS
IF YOU CHECKED OFF ANY PROBLEMS ON THIS QUESTIONNAIRE, HOW DIFFICULT HAVE THESE PROBLEMS MADE IT FOR YOU TO DO YOUR WORK, TAKE CARE OF THINGS AT HOME, OR GET ALONG WITH OTHER PEOPLE: SOMEWHAT DIFFICULT
1. FEELING NERVOUS, ANXIOUS, OR ON EDGE: SEVERAL DAYS
3. WORRYING TOO MUCH ABOUT DIFFERENT THINGS: SEVERAL DAYS
2. NOT BEING ABLE TO STOP OR CONTROL WORRYING: SEVERAL DAYS

## 2023-04-25 ASSESSMENT — PAIN SCALES - GENERAL: PAINLEVEL: NO PAIN (0)

## 2023-04-25 ASSESSMENT — ASTHMA QUESTIONNAIRES: ACT_TOTALSCORE: 18

## 2023-04-25 ASSESSMENT — PATIENT HEALTH QUESTIONNAIRE - PHQ9
SUM OF ALL RESPONSES TO PHQ QUESTIONS 1-9: 12
5. POOR APPETITE OR OVEREATING: SEVERAL DAYS

## 2023-04-25 NOTE — PROGRESS NOTES
Assessment & Plan     Alcohol abuse  Patient's FMLA  Form completed today.  Will be sent to clinic secretaries to be faxed by tomorrow, 4/26/2023.  Patient was advised to work on reducing alcohol consumption and work on eventually stopping use.  Advised against driving under the influence.  Offered mental health referral - patient declined.      956}  Depression Screening Follow Up        4/25/2023     5:04 PM   PHQ   PHQ-9 Total Score 12   Q9: Thoughts of better off dead/self-harm past 2 weeks Not at all         4/25/2023     5:04 PM   Last PHQ-9   1.  Little interest or pleasure in doing things 2   2.  Feeling down, depressed, or hopeless 2   3.  Trouble falling or staying asleep, or sleeping too much 2   4.  Feeling tired or having little energy 2   5.  Poor appetite or overeating 2   6.  Feeling bad about yourself 2   7.  Trouble concentrating 0   8.  Moving slowly or restless 0   Q9: Thoughts of better off dead/self-harm past 2 weeks 0   PHQ-9 Total Score 12   Difficulty at work, home, or with people Somewhat difficult       Follow Up Actions Taken  Patient declined referral.  Patient denied need for medical treatment. He said he will be seeking alcohol abuse treatment based on what his  requires.     There are no Patient Instructions on file for this visit.    Aly Camargo MD  [facility]    Rajesh Jang is a 52 year old, presenting for the following health issues:  Forms (Needs FMLA paperwork completed)        4/25/2023     4:11 PM   Additional Questions   Roomed by Rosemary MON   Accompanied by self     Forms 4/25/2023   Any forms needing to be completed Yes         4/25/2023     4:11 PM   Patient Reported Additional Medications   Patient reports taking the following new medications none     History of Present Illness       Reason for visit:  FMLA paperwork    He eats 2-3 servings of fruits and vegetables daily.He consumes 0 sweetened beverage(s) daily.He exercises  "with enough effort to increase his heart rate 9 or less minutes per day.  He exercises with enough effort to increase his heart rate 3 or less days per week.   He is taking medications regularly.     Patient said he was asked to leave work on 4/6/2023 due to goignt o workunder the influence of alcohol.  Works as a respiratory therapist.  Usual tasks include reading gauges, adjusting settings, helping attend to patients needing respiratory care.  Patient is not in treatment now. He said he is waiting to meet with manager to see what they require of him.  Patient reports anxiety and stress due to problem with his child.  Patient declines mental health referral or medical treatment at this time.    Review of Systems   Constitutional, HEENT, cardiovascular, pulmonary, GI, , musculoskeletal, neuro, skin, endocrine and psych systems are negative, except as otherwise noted.      Objective    /72 (BP Location: Left arm, Patient Position: Chair, Cuff Size: Adult Regular)   Pulse (!) 124   Temp 99.2  F (37.3  C) (Tympanic)   Resp 28   Ht 1.892 m (6' 2.5\")   Wt 91.1 kg (200 lb 12.8 oz)   SpO2 98%   BMI 25.44 kg/m    Body mass index is 25.44 kg/m .  Physical Exam   GEN: alert, oriented x 3, NAD, intermittently trembling  EYES: no jaundice  PSYCH: linear thought process, well-groomed, intermittently jittery, anxious, occasionally mildly tearful    No results found for any visits on 04/25/23.                "

## 2023-04-26 ENCOUNTER — TELEPHONE (OUTPATIENT)
Dept: FAMILY MEDICINE | Facility: CLINIC | Age: 53
End: 2023-04-26
Payer: COMMERCIAL

## 2023-04-26 NOTE — TELEPHONE ENCOUNTER
Forms/Letter Request    Type of form/letter: FMLA    Have you been seen for this request: Yes     Do we have the form/letter: Yes: FMLA papers faxed to [facility], [phone], then routed to Sarah Carranza and placed in her basket LM on patient VM to call back if he wants original forms.      Who is the form from? [facility]      When is form/letter needed by: asap; Faxed to [facility], [phone]    How would you like the form/letter returned: LM for patient to see if he would like the original form    Patient Notified form requests are processed in 3-5 business days:Yes    Could we send this information to you in [employer] or would you prefer to receive a phone call?:   Patient would prefer a phone call   Okay to leave a detailed message?: Yes at Home number on file [phone] (home)

## 2023-06-08 ENCOUNTER — TELEPHONE (OUTPATIENT)
Dept: FAMILY MEDICINE | Facility: CLINIC | Age: 53
End: 2023-06-08
Payer: COMMERCIAL

## 2023-06-08 NOTE — LETTER
June 12, 2023      Juan Pablo Abraham  [address] [address]        To Whom It May Concern:    Juan Pablo Abraham was seen in our clinic. He may return to work without restrictions.      Sincerely,        Aly Camargo MD

## 2023-06-08 NOTE — TELEPHONE ENCOUNTER
Forms/Letter Request    Type of form/letter: Work note stating: Pt can return to work with no restrictions.     Have you been seen for this request: Yes 4/25    Do we have the form/letter: No    When is form/letter needed by: ASAP    How would you like the form/letter returned:     Patient Notified form requests are processed in 3-5 business days:Yes    Could we send this information to you in [place] or would you prefer to receive a phone call?:   Patient would prefer a phone call   Okay to leave a detailed message?: Yes at Cell number on file:    Telephone Information:   Mobile [phone]       .Yadi Tabares [facility]

## 2023-06-09 NOTE — TELEPHONE ENCOUNTER
See message below, any f/u visits needed for this work note request?    Radha Jackson RN  [facility]

## 2023-06-12 ENCOUNTER — MYC MEDICAL ADVICE (OUTPATIENT)
Dept: FAMILY MEDICINE | Facility: CLINIC | Age: 53
End: 2023-06-12
Payer: COMMERCIAL

## 2023-06-12 NOTE — TELEPHONE ENCOUNTER
Covering for primary/ordering provider:  I wrote a note, should be available in [employer], or signed copy on my desk. Should keep upcoming appointment.  Linsey Holt, CNP

## 2023-06-13 NOTE — TELEPHONE ENCOUNTER
See [employer] message. Does this f/u visit have to be with Dr. Camargo, or can care team offer an earlier same-day appt with a different provider? Routing to covering provider for review/recommendation.    Radha Jackson RN  [facility]

## 2023-06-13 NOTE — TELEPHONE ENCOUNTER
Patient is reached and told of availability of letter on my chart.  Patient has accessed this already. Maria T QUINTEROS RN

## 2023-06-20 ENCOUNTER — VIRTUAL VISIT (OUTPATIENT)
Dept: FAMILY MEDICINE | Facility: CLINIC | Age: 53
End: 2023-06-20
Payer: COMMERCIAL

## 2023-06-20 DIAGNOSIS — F10.11 ALCOHOL ABUSE, IN REMISSION: Primary | ICD-10-CM

## 2023-06-20 PROCEDURE — 99213 OFFICE O/P EST LOW 20 MIN: CPT | Mod: VID | Performed by: FAMILY MEDICINE

## 2023-06-20 ASSESSMENT — ASTHMA QUESTIONNAIRES
ACT_TOTALSCORE: 16
QUESTION_4 LAST FOUR WEEKS HOW OFTEN HAVE YOU USED YOUR RESCUE INHALER OR NEBULIZER MEDICATION (SUCH AS ALBUTEROL): THREE OR MORE TIMES PER DAY
QUESTION_1 LAST FOUR WEEKS HOW MUCH OF THE TIME DID YOUR ASTHMA KEEP YOU FROM GETTING AS MUCH DONE AT WORK, SCHOOL OR AT HOME: A LITTLE OF THE TIME
ACT_TOTALSCORE: 16
QUESTION_2 LAST FOUR WEEKS HOW OFTEN HAVE YOU HAD SHORTNESS OF BREATH: ONCE OR TWICE A WEEK
QUESTION_3 LAST FOUR WEEKS HOW OFTEN DID YOUR ASTHMA SYMPTOMS (WHEEZING, COUGHING, SHORTNESS OF BREATH, CHEST TIGHTNESS OR PAIN) WAKE YOU UP AT NIGHT OR EARLIER THAN USUAL IN THE MORNING: ONCE OR TWICE
QUESTION_5 LAST FOUR WEEKS HOW WOULD YOU RATE YOUR ASTHMA CONTROL: SOMEWHAT CONTROLLED

## 2023-06-20 NOTE — PROGRESS NOTES
Suhail is a 53 year old who is being evaluated via a billable video visit.      How would you like to obtain your AVS? MyChart  If the video visit is dropped, the invitation should be resent by: Send to e-mail at: [email or web address]  Will anyone else be joining your video visit? No        Assessment & Plan     Alcohol abuse  Sober for 37 days now.  Patient has no observable cognitive, mental or physical abnormality on video chat.  Patient will drop of HPSP form tomorrow to be reviewed by provider and signed if needed.  Encounter notes will be available for patient to share with monitoring body.    :[id number]  There are no Patient Instructions on file for this visit.    Aly Camargo MD  [facility]    Subjective   Suhail is a 53 year old, presenting for the following health issues:  Follow Up        4/25/2023     4:11 PM   Additional Questions   Roomed by Rosemary MON   Accompanied by self     History of Present Illness       Reason for visit:  Follow up on [facility] 28 day inpatient [place].    He eats 2-3 servings of fruits and vegetables daily.He consumes 1 sweetened beverage(s) daily.He exercises with enough effort to increase his heart rate 9 or less minutes per day.  He exercises with enough effort to increase his heart rate 3 or less days per week.   He is taking medications regularly.       Follow up for [place] - HPSP form.    Patient read the form on video; requesting information on:  Primary treatment focus - alcohol use disorder  Secondary treatment focus - ?    Restrictions? None.  Mental health: no concern.  Physically well.    Has been back to work since last week - no issues per patient    Has been sober from alcohol 37 days.    Reports hard stools since being sober.  Does eat good amount of dietary fiber.  Patient is active.          Review of Systems   Constitutional, HEENT, cardiovascular, pulmonary, GI, , musculoskeletal, neuro, skin, endocrine and psych systems are negative, except as  otherwise noted.      Objective           Vitals:  No vitals were obtained today due to virtual visit.    Physical Exam   GENERAL: alert, no distress and in good spirits  EYES: Eyes grossly normal to inspection.  No discharge or erythema, or obvious scleral/conjunctival abnormalities.  RESP: No audible wheeze, cough, or visible cyanosis.  No visible retractions or increased work of breathing.    SKIN: Visible skin clear. No significant rash, abnormal pigmentation or lesions.  NEURO: Cranial nerves grossly intact.  Mentation and speech appropriate for age.  PSYCH: Mentation appears normal, affect normal/bright, judgement and insight intact, normal speech and appearance well-groomed.    No results found for any visits on 06/20/23.        Video-Visit Details    Type of service:  Video Visit     Originating Location (pt. Location): Home  Distant Location (provider location):  On-site  Platform used for Video Visit: Olivia

## 2023-06-27 ENCOUNTER — TELEPHONE (OUTPATIENT)
Dept: FAMILY MEDICINE | Facility: CLINIC | Age: 53
End: 2023-06-27
Payer: COMMERCIAL

## 2023-06-27 NOTE — TELEPHONE ENCOUNTER
[place] [employer] (HPSP) form received and routed to DR. Camargo for review and signature.

## 2023-06-28 NOTE — TELEPHONE ENCOUNTER
Form completed, signed, and faxed to [facility] at [phone]. Copy of form sent to scan and copy placed in cabinet.

## 2023-07-09 ENCOUNTER — HEALTH MAINTENANCE LETTER (OUTPATIENT)
Age: 53
End: 2023-07-09

## 2023-07-11 DIAGNOSIS — E78.5 HYPERLIPIDEMIA LDL GOAL <100: ICD-10-CM

## 2023-07-11 DIAGNOSIS — I10 ESSENTIAL HYPERTENSION: ICD-10-CM

## 2023-07-12 RX ORDER — LOSARTAN POTASSIUM 100 MG/1
100 TABLET ORAL DAILY
Qty: 90 TABLET | Refills: 1 | Status: SHIPPED | OUTPATIENT
Start: 2023-07-12 | End: 2023-09-19

## 2023-07-12 RX ORDER — NIFEDIPINE 30 MG/1
30 TABLET, EXTENDED RELEASE ORAL DAILY
Qty: 90 TABLET | Refills: 1 | Status: SHIPPED | OUTPATIENT
Start: 2023-07-12 | End: 2023-09-19

## 2023-07-12 RX ORDER — ATORVASTATIN CALCIUM 20 MG/1
20 TABLET, FILM COATED ORAL DAILY
Qty: 90 TABLET | Refills: 1 | Status: SHIPPED | OUTPATIENT
Start: 2023-07-12 | End: 2023-09-19

## 2023-07-24 DIAGNOSIS — J45.40 MODERATE PERSISTENT ASTHMA WITHOUT COMPLICATION: ICD-10-CM

## 2023-07-25 RX ORDER — ALBUTEROL SULFATE 90 UG/1
AEROSOL, METERED RESPIRATORY (INHALATION)
Qty: 8.5 G | Refills: 0 | Status: SHIPPED | OUTPATIENT
Start: 2023-07-25 | End: 2023-09-11

## 2023-07-25 NOTE — TELEPHONE ENCOUNTER
"Requested Prescriptions   Pending Prescriptions Disp Refills    albuterol (PROAIR HFA/PROVENTIL HFA/VENTOLIN HFA) 108 (90 Base) MCG/ACT inhaler [Pharmacy Med Name: ALBUTEROL SULFATE  AERS] 8.5 g 0     Sig: INHALE ONE PUFF BY MOUTH FOUR TIMES A DAY AS NEEDED FOR WHEEZING FOR 30 DAYS       Asthma Maintenance Inhalers - Anticholinergics Failed - 7/24/2023  3:56 PM        Failed - Asthma control assessment score within normal limits in last 6 months     Please review ACT score.           Passed - Patient is age 12 years or older        Passed - Medication is active on med list        Passed - Recent (6 mo) or future (30 days) visit within the authorizing provider's specialty     Patient had office visit in the last 6 months or has a visit in the next 30 days with authorizing provider or within the authorizing provider's specialty.  See \"Patient Info\" tab in inbasket, or \"Choose Columns\" in Meds & Orders section of the refill encounter.           Short-Acting Beta Agonist Inhalers Protocol  Failed - 7/24/2023  3:56 PM        Failed - Asthma control assessment score within normal limits in last 6 months     Please review ACT score.           Passed - Patient is age 12 or older        Passed - Medication is active on med list        Passed - Recent (6 mo) or future (30 days) visit within the authorizing provider's specialty     Patient had office visit in the last 6 months or has a visit in the next 30 days with authorizing provider or within the authorizing provider's specialty.  See \"Patient Info\" tab in inbasket, or \"Choose Columns\" in Meds & Orders section of the refill encounter.                 "

## 2023-09-11 DIAGNOSIS — J45.40 MODERATE PERSISTENT ASTHMA WITHOUT COMPLICATION: ICD-10-CM

## 2023-09-11 RX ORDER — ALBUTEROL SULFATE 90 UG/1
AEROSOL, METERED RESPIRATORY (INHALATION)
Qty: 8.5 G | Refills: 0 | Status: SHIPPED | OUTPATIENT
Start: 2023-09-11 | End: 2023-09-19

## 2023-09-11 NOTE — TELEPHONE ENCOUNTER
LM on patient VM refill approved and sent to pharmacy. Patricia Quinteros on 9/11/2023 at 4:09 PM

## 2023-09-11 NOTE — TELEPHONE ENCOUNTER
Patient made appt for 9/19/23, is requesting enough med to get to appt. Patricia Quinteros on 9/11/2023 at 3:13 PM

## 2023-09-14 ENCOUNTER — TELEPHONE (OUTPATIENT)
Dept: FAMILY MEDICINE | Facility: CLINIC | Age: 53
End: 2023-09-14
Payer: COMMERCIAL

## 2023-09-14 NOTE — LETTER
September 14, 2023    To  Juan Pablo Abraham  [address] [address]    Your team at [facility] cares about your health. We have reviewed your chart and based on our findings; we are making the following recommendations to better manage your health.     You are in particular need of attention regarding the following:     Call or MyChart message your clinic to schedule a colonoscopy, schedule/ a FIT Test, or order a Cologuard test. If you are unsure what type of test you need, please call your clinic and speak to clinic staff.   Colon cancer is now the second leading cause of cancer-related deaths in the [place] for both men and women and there are over 130,000 new cases and 50,000 deaths per year from colon cancer. Colonoscopies can prevent 90-95% of these deaths. Problem lesions can be removed before they ever become cancer. This test is not only looking for cancer, but also getting rid of precancerous lesions.   If you are under/uninsured, we recommend you contact the [employer] Program.[employer] is a free colorectal cancer screening program that provides colonoscopies for eligible under/uninsured [place] men and women. If you are interested in receiving a free colonoscopy, please call [employer] at t [phone] (mention code [employer]) to see if you're eligible. Please have them send us the results.   Please call [phone] to schedule a colonoscopy.    If you have already completed these items, please contact the clinic via phone or   [employer] so your care team can review and update your records. Thank you for   choosing [facility] for your healthcare needs. For any questions,   concerns, or to schedule an appointment please contact our clinic.    Healthy Regards,      Your [facility] Care Team

## 2023-09-14 NOTE — TELEPHONE ENCOUNTER
Patient Quality Outreach    Patient is due for the following:   Colon Cancer Screening    Next Steps:   Schedule appointment for colonoscopy     Type of outreach:    Sent letter.    Next Steps:  Reach out within 90 days via Letter.    Max number of attempts reached: Yes. Will try again in 90 days if patient still on fail list.    Questions for provider review:    None           Torie Soto MA

## 2023-09-19 ENCOUNTER — OFFICE VISIT (OUTPATIENT)
Dept: FAMILY MEDICINE | Facility: CLINIC | Age: 53
End: 2023-09-19
Payer: COMMERCIAL

## 2023-09-19 VITALS
RESPIRATION RATE: 24 BRPM | HEART RATE: 101 BPM | SYSTOLIC BLOOD PRESSURE: 138 MMHG | HEIGHT: 74 IN | WEIGHT: 233 LBS | BODY MASS INDEX: 29.9 KG/M2 | OXYGEN SATURATION: 96 % | DIASTOLIC BLOOD PRESSURE: 88 MMHG | TEMPERATURE: 98.5 F

## 2023-09-19 DIAGNOSIS — E78.5 HYPERLIPIDEMIA LDL GOAL <100: ICD-10-CM

## 2023-09-19 DIAGNOSIS — E11.9 TYPE 2 DIABETES MELLITUS WITHOUT COMPLICATION, WITHOUT LONG-TERM CURRENT USE OF INSULIN (H): ICD-10-CM

## 2023-09-19 DIAGNOSIS — J45.40 MODERATE PERSISTENT ASTHMA WITHOUT COMPLICATION: Primary | ICD-10-CM

## 2023-09-19 DIAGNOSIS — R39.9 LOWER URINARY TRACT SYMPTOMS (LUTS): ICD-10-CM

## 2023-09-19 DIAGNOSIS — I10 ESSENTIAL HYPERTENSION: ICD-10-CM

## 2023-09-19 DIAGNOSIS — Z12.11 SCREEN FOR COLON CANCER: ICD-10-CM

## 2023-09-19 DIAGNOSIS — K21.9 GASTROESOPHAGEAL REFLUX DISEASE WITHOUT ESOPHAGITIS: ICD-10-CM

## 2023-09-19 PROCEDURE — 99207 PR FOOT EXAM NO CHARGE: CPT | Performed by: FAMILY MEDICINE

## 2023-09-19 PROCEDURE — 99214 OFFICE O/P EST MOD 30 MIN: CPT | Performed by: FAMILY MEDICINE

## 2023-09-19 RX ORDER — ATORVASTATIN CALCIUM 20 MG/1
20 TABLET, FILM COATED ORAL DAILY
Qty: 90 TABLET | Refills: 1 | Status: SHIPPED | OUTPATIENT
Start: 2023-09-19 | End: 2024-03-20

## 2023-09-19 RX ORDER — LOSARTAN POTASSIUM 100 MG/1
100 TABLET ORAL DAILY
Qty: 90 TABLET | Refills: 1 | Status: SHIPPED | OUTPATIENT
Start: 2023-09-19 | End: 2024-03-20

## 2023-09-19 RX ORDER — BUDESONIDE AND FORMOTEROL FUMARATE DIHYDRATE 160; 4.5 UG/1; UG/1
AEROSOL RESPIRATORY (INHALATION)
Qty: 10 G | Refills: 11 | Status: SHIPPED | OUTPATIENT
Start: 2023-09-19 | End: 2024-08-22

## 2023-09-19 RX ORDER — NIFEDIPINE 30 MG/1
30 TABLET, EXTENDED RELEASE ORAL DAILY
Qty: 90 TABLET | Refills: 1 | Status: SHIPPED | OUTPATIENT
Start: 2023-09-19 | End: 2024-06-03

## 2023-09-19 RX ORDER — ALBUTEROL SULFATE 90 UG/1
2 AEROSOL, METERED RESPIRATORY (INHALATION) EVERY 4 HOURS PRN
Qty: 8.5 G | Refills: 11 | Status: SHIPPED | OUTPATIENT
Start: 2023-09-19 | End: 2024-08-22

## 2023-09-19 RX ORDER — TAMSULOSIN HYDROCHLORIDE 0.4 MG/1
0.4 CAPSULE ORAL DAILY
Qty: 30 CAPSULE | Refills: 0 | Status: SHIPPED | OUTPATIENT
Start: 2023-09-19 | End: 2023-11-06

## 2023-09-19 ASSESSMENT — ASTHMA QUESTIONNAIRES: ACT_TOTALSCORE: 16

## 2023-09-19 ASSESSMENT — PAIN SCALES - GENERAL: PAINLEVEL: NO PAIN (1)

## 2023-09-19 NOTE — PROGRESS NOTES
Assessment & Plan     Moderate persistent asthma without complication  Controlled per patient.  Patient will obtain immunizations withtin the next 30 days.  - albuterol (PROAIR HFA/PROVENTIL HFA/VENTOLIN HFA) 108 (90 Base) MCG/ACT inhaler  Dispense: 8.5 g; Refill: 11  - budesonide-formoterol (SYMBICORT) 160-4.5 MCG/ACT Inhaler  Dispense: 10 g; Refill: 11    Hyperlipidemia LDL goal <100  Reinforced heart healthy lifestyle.   - atorvastatin (LIPITOR) 20 MG tablet  Dispense: 90 tablet; Refill: 1    Essential hypertension  Controlled.  Low salt, low fat diet.   Exercise as tolerated.  Take meds as prescribed; call if with side effects.    - BASIC METABOLIC PANEL  - losartan (COZAAR) 100 MG tablet  Dispense: 90 tablet; Refill: 1  - NIFEdipine ER OSMOTIC (PROCARDIA XL) 30 MG 24 hr tablet  Dispense: 90 tablet; Refill: 1    Gastroesophageal reflux disease without esophagitis  Stable per patient.  - omeprazole (PRILOSEC) 20 MG DR capsule  Dispense: 90 capsule; Refill: 3    Type 2 diabetes mellitus without complication, without long-term current use of insulin (H)  - Albumin Random Urine Quantitative with Creat Ratio  - HEMOGLOBIN A1C  - Lipid panel reflex to direct LDL Fasting  - FOOT EXAM  Reinforced carbohydrate control.  Regular exercise.  Regular foot care, annual eye exam.   Patient is not on med.    Screen for colon cancer  Patient said he will schedule endoscopy before end of the year.  - Colonoscopy Screening  Referral    Lower urinary tract symptoms (LUTS)  Discussed possible causes.   Screen for prostate neoplasm as well.  Offered trial of flomax to improve emptying : patient agreed to start  after discussing possible ADR To med.  If PSA is elevated or if worsening, consult urology.   - tamsulosin (FLOMAX) 0.4 MG capsule  Dispense: 30 capsule; Refill: 0      There are no Patient Instructions on file for this visit.    Aly Camargo MD  [facility]    Rajesh   Suhail is a 53  year old, presenting for the following health issues:  Urinary Problem        9/19/2023     3:53 PM   Additional Questions   Roomed by Torie MAST MA   Accompanied by Self         9/19/2023     3:53 PM   Patient Reported Additional Medications   Patient reports taking the following new medications OTC supplement for prostate health- unknown med or dose       History of Present Illness       Reason for visit:  Prostate  Symptom onset:  More than a month    He eats 2-3 servings of fruits and vegetables daily.He exercises with enough effort to increase his heart rate 10 to 19 minutes per day.    He is taking medications regularly.     LUTS for the last possibly almost a year.  Nocturia every 2 hours, feeling of incomplete emptying, slower stream and some sexual dysfunction.  Denies dysuria, hematuria or rectal pain.    Hyperlipidemia Follow-Up    Are you regularly taking any medication or supplement to lower your cholesterol?   Yes- Atorvastatin 20mg  Are you having muscle aches or other side effects that you think could be caused by your cholesterol lowering medication?  Yes- sometimes    Hypertension Follow-up    Do you check your blood pressure regularly outside of the clinic? Yes , once per month  Are you following a low salt diet? No  Are your blood pressures ever more than 140 on the top number (systolic) OR more   than 90 on the bottom number (diastolic), for example 140/90? Yes    Asthma Follow-Up    Was ACT completed today?  Yes        9/19/2023     3:44 PM   ACT Total Scores   ACT TOTAL SCORE (Goal Greater than or Equal to 20) 16   In the past 12 months, how many times did you visit the emergency room for your asthma without being admitted to the hospital? 0   In the past 12 months, how many times were you hospitalized overnight because of your asthma? 0       How many days per week do you miss taking your asthma controller medication?  0  Please describe any recent triggers for your asthma: smoke, dust mites,  "pollens, emotions, cold air, and Patient is unaware of triggers  Have you had any Emergency Room Visits, Urgent Care Visits, or Hospital Admissions since your last office visit?  No    Patient Active Problem List   Diagnosis    Hyperlipidemia LDL goal <100    Type 2 diabetes mellitus without complication (H)    Essential hypertension    Moderate persistent asthma without complication    Gastroesophageal reflux disease without esophagitis    Alcohol abuse     No past surgical history on file.    Social History     Tobacco Use    Smoking status: Former     Years: 20.00     Types: Cigarettes, Dip, chew, snus or snuff    Smokeless tobacco: Former     Types: Chew     Quit date: 2007    Tobacco comments:     smoked cigarettes but not a regular basis a long time ago now, occ chew-4 cans a month   Substance Use Topics    Alcohol use: Not Currently     Family History   Problem Relation Age of Onset    Hypertension Father     Cerebrovascular Disease Father          at 56, CVA in his 40s    Cancer Father     Circulatory Maternal Grandmother         CHF    Hypertension Maternal Grandmother     Alcohol/Drug Maternal Grandfather     Unknown/Adopted Paternal Grandmother     Unknown/Adopted Paternal Grandfather     Hypertension Mother     Diabetes No family hx of               Review of Systems   Constitutional, HEENT, cardiovascular, pulmonary, GI, , musculoskeletal, neuro, skin, endocrine and psych systems are negative, except as otherwise noted.      Objective    /88 (BP Location: Left arm, Patient Position: Sitting, Cuff Size: Adult Large)   Pulse 101   Temp 98.5  F (36.9  C) (Tympanic)   Resp 24   Ht 1.885 m (6' 2.2\")   Wt 105.7 kg (233 lb)   SpO2 96%   BMI 29.75 kg/m    Body mass index is 29.75 kg/m .  Physical Exam   GENERAL: overweight, alert and no distress, ambulatory w/o assist  EYES: no icterus; pink conjunctivae.  NECK: no tenderness, no adenopathy,  Thyroid not enlarged  RESP: lungs clear to " auscultation - no rales, no rhonchi, no wheezes  CV: regular rates and rhythm, no murmur  MS: no edema  SKIN: no suspicious lesions, no rashes  NEURO: strength and tone- normal, sensory exam- grossly normal, mentation- intact, speech- normal, reflexes- symmetric  ABD:  nontender  FOOT EXAM: no ulcer/erythema; pedal pulses strong bilaterally; monofilament: no deficit     Lab on 09/09/2022   Component Date Value Ref Range Status    Sodium 09/09/2022 139  136 - 145 mmol/L Final    Potassium 09/09/2022 3.9  3.4 - 5.3 mmol/L Final    Creatinine 09/09/2022 0.89  0.67 - 1.17 mg/dL Final    Urea Nitrogen 09/09/2022 18.0  6.0 - 20.0 mg/dL Final    Chloride 09/09/2022 107  98 - 107 mmol/L Final    Carbon Dioxide (CO2) 09/09/2022 20 (L)  22 - 29 mmol/L Final    Anion Gap 09/09/2022 12  7 - 15 mmol/L Final    Glucose 09/09/2022 115 (H)  70 - 99 mg/dL Final    Calcium 09/09/2022 9.7  8.6 - 10.0 mg/dL Final    Protein Total 09/09/2022 7.7  6.4 - 8.3 g/dL Final    Albumin 09/09/2022 3.6  3.5 - 5.2 g/dL Final    Bilirubin Total 09/09/2022 0.3  <=1.2 mg/dL Final    Alkaline Phosphatase 09/09/2022 115  40 - 129 U/L Final    AST 09/09/2022 42  10 - 50 U/L Final    ALT 09/09/2022 52 (H)  10 - 50 U/L Final    GFR Estimate 09/09/2022 >90  >60 mL/min/1.73m2 Final    Effective December 21, 2021 eGFRcr in adults is calculated using the 2021 CKD-EPI creatinine equation which includes age and gender (Juan et al., NEJM, DOI: 10.1056/FGITje9818965)    Hepatitis C Antibody 09/09/2022 Nonreactive  Nonreactive Final    HIV Antigen Antibody Combo 09/09/2022 Nonreactive  Nonreactive Final    HIV-1 p24 Ag & HIV-1/HIV-2 Ab Not Detected    Hemoglobin A1C 09/09/2022 5.7 (H)  0.0 - 5.6 % Final    Normal <5.7%   Prediabetes 5.7-6.4%    Diabetes 6.5% or higher     Note: Adopted from ADA consensus guidelines.    Cholesterol 09/09/2022 145  <200 mg/dL Final    Triglycerides 09/09/2022 87  <150 mg/dL Final    Direct Measure HDL 09/09/2022 32 (L)  >=40 mg/dL  Final    LDL Cholesterol Calculated 09/09/2022 96  <=100 mg/dL Final    Non HDL Cholesterol 09/09/2022 113  <130 mg/dL Final

## 2023-10-23 ENCOUNTER — VIRTUAL VISIT (OUTPATIENT)
Dept: FAMILY MEDICINE | Facility: CLINIC | Age: 53
End: 2023-10-23
Payer: COMMERCIAL

## 2023-10-23 DIAGNOSIS — J45.40 MODERATE PERSISTENT ASTHMA WITHOUT COMPLICATION: Primary | ICD-10-CM

## 2023-10-23 DIAGNOSIS — J40 BRONCHITIS: ICD-10-CM

## 2023-10-23 DIAGNOSIS — J45.901 MODERATE ASTHMA WITH EXACERBATION, UNSPECIFIED WHETHER PERSISTENT: ICD-10-CM

## 2023-10-23 PROCEDURE — 99214 OFFICE O/P EST MOD 30 MIN: CPT | Mod: VID | Performed by: FAMILY MEDICINE

## 2023-10-23 RX ORDER — PREDNISONE 20 MG/1
20 TABLET ORAL 2 TIMES DAILY
Qty: 10 TABLET | Refills: 0 | Status: SHIPPED | OUTPATIENT
Start: 2023-10-23 | End: 2023-10-28

## 2023-10-23 RX ORDER — AZITHROMYCIN 250 MG/1
TABLET, FILM COATED ORAL
Qty: 6 TABLET | Refills: 0 | Status: SHIPPED | OUTPATIENT
Start: 2023-10-23 | End: 2023-10-28

## 2023-10-23 RX ORDER — MONTELUKAST SODIUM 10 MG/1
10 TABLET ORAL AT BEDTIME
Qty: 30 TABLET | Refills: 1 | Status: SHIPPED | OUTPATIENT
Start: 2023-10-23 | End: 2024-08-22

## 2023-10-23 ASSESSMENT — ASTHMA QUESTIONNAIRES: ACT_TOTALSCORE: 19

## 2023-10-23 NOTE — PROGRESS NOTES
"Suhail is a 53 year old who is being evaluated via a billable video visit.      How would you like to obtain your AVS? Grata VIDEO  If the video visit is dropped, the invitation should be resent by: Text to cell phone: [phone]  Will anyone else be joining your video visit? No          Assessment & Plan     (J40) Bronchitis  Comment:   Plan: azithromycin (ZITHROMAX) 250 MG tablet            URI lingering onto bronchitis. Treat with z pack.Cares and symptomatic treatment discussed follow up if problem     (J45.791) Moderate asthma with exacerbation, unspecified whether persistent  Comment:   Plan: montelukast (SINGULAIR) 10 MG tablet,         predniSONE (DELTASONE) 20 MG tablet            Discussed allergy/ asthma and treatment.  His asthma has not been well controlled and recently aggravated with uri sx. gave oral  prednisone x 5 days   also z pack for 5 day/he is also willing to add singular to see if helps with better long term control   He should also continue his Symbicort inhaler  He has enough  albuterol to use as needed. Need to monitor the need for albuterol . Cares and symptomatic treatment discussed follow up  In 4 weeks sooner, if problem       refill sent.Cares and  treatment discussed.  follow. up if problem   Patient expressed understanding and agreement with treatment plan. All patient's questions were answered, will let me know if has more later.  Medications: Rx's: Reviewed the potential side effects/complications of medications prescribed.            BMI:   Estimated body mass index is 29.75 kg/m  as calculated from the following:    Height as of 9/19/23: 1.885 m (6' 2.2\").    Weight as of 9/19/23: 105.7 kg (233 lb).       Awilda Rodriguez MD  [facility]   Suhail is a 53 year old, presenting for the following health issues:  URI        9/19/2023     3:53 PM   Additional Questions   Roomed by Torie MAST MA   Accompanied by Self       URI    History of " Present Illness     Asthma:  He presents for follow up of asthma.  He has some cough, some wheezing, and some shortness of breath.  He is using a relief medication every 4 hours. He does not miss any doses of his controller medication throughout the week. Patient is aware of the following triggers: upper respiratory infections. The patient has not had a visit to the Emergency Room, Urgent Care or Hospital due to asthma since the last clinic visit. He consumes 1 sweetened beverage(s) daily. He exercises with enough effort to increase his heart rate 3 or less days per week.   He is taking medications regularly.   concern about possible URI w/asthma exacerbation - home Covid test x2 neg           Asthma Follow-Up    Was ACT completed today?  Yes  . Has been exacerbated since recent URI sx and needing albuterol every 4 hours . Also has used nebulizer. He think in general his asthma need to be better, since even despite taking his Symbicort regularly he sometimes needs his albuterol. Especially if he is out in cold weather/ exercise etc but since last week sx have been sore and needing albuterol very frequently so hs think  he also  needs his prednisone . 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 0   In the past 12 months, how many times were you hospitalized overnight because of your asthma? 0       How many days per week do you miss taking your asthma controller medication?  0  Please describe any recent triggers for your asthma: upper respiratory infections  Have you had any Emergency Room Visits, Urgent Care Visits, or Hospital Admissions since your last office visit?  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   Onset/Duration: 3-4 day  Symptoms:  Fever: No  Chills/Sweats: No  Headache (location?): No  Sinus Pressure: YES  Conjunctivitis:  No  Ear Pain: YES: bilateral  Rhinorrhea: YES  Congestion: YES  Sore Throat  NO   Cough: YES-productive of yellow sputum  Wheeze: No  Decreased Appetite: No  Nausea: No  Vomiting: No  Diarrhea: No  Dysuria/Fr eq.: No  Dysuria or Hematuria: No  Fatigue/Chinless: No  Sick/Strep Exposure: No  Therapies tried and outcome: no known exposure but he works in hospital so always around sick people .has negative Covid test x 2       Review of Systems   Constitutional, HEENT, cardiovascular, pulmonary, GI, , musculoskeletal, neuro, skin, endocrine and psych systems are negative, except as otherwise noted.      Objective           Vitals:  No vitals were obtained today due to virtual visit.    Physical Exam   GENERAL: Healthy, alert and no distress  EYES: Eyes grossly normal to inspection.  No discharge or erythema, or obvious scleral/conjunctival abnormalities.  SKIN: Visible skin clear. No significant rash, abnormal pigmentation or lesions.  NEURO: Cranial nerves grossly intact.  Mentation and speech appropriate for age.  PSYCH: Mentation appears normal, affect normal/bright, judgement and insight intact, normal speech and appearance well-groomed.            Video-Visit Details    Type of service:  Video Visit     Originating Location (pt. Location): Home    Distant Location (provider location):  Off-site  Platform used for Video Visit: [employer]

## 2023-11-02 DIAGNOSIS — R39.9 LOWER URINARY TRACT SYMPTOMS (LUTS): ICD-10-CM

## 2023-11-04 ENCOUNTER — NURSE TRIAGE (OUTPATIENT)
Dept: NURSING | Facility: CLINIC | Age: 53
End: 2023-11-04
Payer: COMMERCIAL

## 2023-11-04 NOTE — TELEPHONE ENCOUNTER
COVID Positive/Requesting COVID treatment    Patient is positive for COVID and requesting treatment options.    Date of positive COVID test (PCR or at home)? 11/3/23  Current COVID symptoms: fever or chills, cough, shortness of breath or difficulty breathing, fatigue, muscle or body aches, and congestion or runny nose  Date COVID symptoms began: 11/2/23    Uses [facility].   Hx of asthma.    Using his inhaler.    Message should be routed to clinic RN pool. Best phone number to use for call back: [phone]    Reason for Disposition   [1] HIGH RISK patient (e.g., weak immune system, age > 64 years, obesity with BMI 30 or higher, pregnant, chronic lung disease or other chronic medical condition) AND [2] COVID symptoms (e.g., cough, fever)  (Exceptions: Already seen by PCP and no new or worsening symptoms.)    Additional Information   Negative: SEVERE difficulty breathing (e.g., struggling for each breath, speaks in single words)   Negative: Difficult to awaken or acting confused (e.g., disoriented, slurred speech)   Negative: Bluish (or gray) lips or face now   Negative: Shock suspected (e.g., cold/pale/clammy skin, too weak to stand, low BP, rapid pulse)   Negative: Sounds like a life-threatening emergency to the triager   Negative: SEVERE or constant chest pain or pressure  (Exception: Mild central chest pain, present only when coughing.)   Negative: MODERATE difficulty breathing (e.g., speaks in phrases, SOB even at rest, pulse 100-120)   Negative: [1] Headache AND [2] stiff neck (can't touch chin to chest)   Negative: Oxygen level (e.g., pulse oximetry) 90 percent or lower   Negative: Chest pain or pressure  (Exception: MILD central chest pain, present only when coughing.)   Negative: [1] Drinking very little AND [2] dehydration suspected (e.g., no urine > 12 hours, very dry mouth, very lightheaded)   Negative: Patient sounds very sick or weak to the triager   Negative: MILD difficulty breathing  (e.g., minimal/no SOB at rest, SOB with walking, pulse <100)   Negative: Fever > 103 F (39.4 C)   Negative: [1] Fever > 101 F (38.3 C) AND [2] age > 60 years   Negative: [1] Fever > 100.0 F (37.8 C) AND [2] bedridden (e.g., CVA, chronic illness, recovering from surgery)   Negative: Oxygen level (e.g., pulse oximetry) 91 to 94 percent    Protocols used: Coronavirus (COVID-19) Diagnosed or [id number]    Janki Rodrigez RN on 11/4/2023 at 1:16 PM

## 2023-11-06 RX ORDER — TAMSULOSIN HYDROCHLORIDE 0.4 MG/1
0.4 CAPSULE ORAL DAILY
Qty: 90 CAPSULE | Refills: 1 | Status: SHIPPED | OUTPATIENT
Start: 2023-11-06 | End: 2024-07-21

## 2023-11-06 NOTE — TELEPHONE ENCOUNTER
RN COVID TREATMENT VISIT  11/06/23      The patient has been triaged and does not require a higher level of care.    Juan Pablo Abraham  53 year old  Current weight? 240 lbs    Has the patient been seen by a primary care provider at an [facility] or [facility] within the past two years? Yes.   Have you been in close proximity to/do you have a known exposure to a person with a confirmed case of influenza? No.     General treatment eligibility:  Date of positive COVID test (PCR or at home)?  11-3-23    Are you or have you been hospitalized for this COVID-19 infection? No.   Have you received monoclonal antibodies or antiviral treatment for COVID-19 since this positive test? No.   Do you have any of the following conditions that place you at risk of being very sick from COVID-19?   - Age 50 years or older  - Chronic lung diseases such as asthma, bronchiectasis, COPD, interstitial lung disease, pulmonary embolism, pulmonary hypertension   - Diabetes mellitus, type 1 and type 2  - Overweight or Obesity (BMI >85th percentile or BMI 25 or higher)  - Substance use disorder including alcohol abuse, alcohol dependency, chemical dependency  Yes, patient has at least one high risk condition as noted above.     Current COVID symptoms:   - fever or chills  - cough  - shortness of breath or difficulty breathing  - fatigue  - muscle or body aches  - headache  - new loss of taste or smell  - congestion or runny nose  Yes. Patient has at least one symptom as selected.     How many days since symptoms started? 5 days or less. Established patient, 12 years or older weighing at least 88.2 lbs, who has symptoms that started in the past 5 days, has not been hospitalized nor received treatment already, and is at risk for being very sick from COVID-19.     Treatment eligibility by RN:  Are you currently pregnant or nursing? No  Do you have a clinically significant hypersensitivity to nirmatrelvir or ritonavir, or  toxic epidermal necrolysis (TEN) or Pope-Iam Syndrome? No  Do you have a history of hepatitis, any hepatic impairment on the Problem List (such as Child-Topete Class C, cirrhosis, fatty liver disease, alcoholic liver disease), or was the last liver lab (hepatic panel, ALT, AST, ALK Phos, bilirubin) elevated in the past 6 months? No  Do you have any history of severe renal impairment (eGFR < 30mL/min)? No    Is patient eligible to continue? Yes, patient meets all eligibility requirements for the RN COVID treatment (as denoted by all no responses above).     Current Outpatient Medications   Medication Sig Dispense Refill    albuterol (PROAIR HFA/PROVENTIL HFA/VENTOLIN HFA) 108 (90 Base) MCG/ACT inhaler Inhale 2 puffs into the lungs every 4 hours as needed for shortness of breath or wheezing 8.5 g 11    ASPIRIN NOT PRESCRIBED, INTENTIONAL, continuous prn for other Antiplatelet medication not prescribed intentionally due to Not indicated based on age (Patient not taking: Reported on 9/19/2023) 1 each 0    atorvastatin (LIPITOR) 20 MG tablet Take 1 tablet (20 mg) by mouth daily 90 tablet 1    budesonide-formoterol (SYMBICORT) 160-4.5 MCG/ACT Inhaler INHALE TWO PUFFS INTO THE LUNGS TWICE A DAY 10 g 11    glucose blood VI test strips (FREESTYLE LITE) strip Use to test blood sugars 2 times daily or as directed. (Patient not taking: Reported on 7/6/2020) 100 strip 12    losartan (COZAAR) 100 MG tablet Take 1 tablet (100 mg) by mouth daily 90 tablet 1    montelukast (SINGULAIR) 10 MG tablet Take 1 tablet (10 mg) by mouth at bedtime 30 tablet 1    NIFEdipine ER OSMOTIC (PROCARDIA XL) 30 MG 24 hr tablet Take 1 tablet (30 mg) by mouth daily 90 tablet 1    tamsulosin (FLOMAX) 0.4 MG capsule Take 1 capsule (0.4 mg) by mouth daily 30 capsule 0       Medications from List 1 of the standing order (on medications that exclude the use of Paxlovid) that patient is taking: NONE. Is patient taking Rodriguez's Wort? No  Is patient  "taking Saunemin's Wort or any meds from List 1? No.   Medications from List 2 of the standing order (on meds that provider needs to adjust) that patient is taking: nifedipine (Adalat, Procardia), explained a provider visit is necessary to discuss medication adjustments while taking Paxlovid. Is patient on any of the meds from List 2? Yes. Patient will be scheduled or transferred to a  at the end of this call.     Patient declines to schedule a visit, states \"I don't feel that bad, I will just ride it out\". Advised if having increased SOB, he should go into the ED for evaluation, patient verbalized good understanding.     Julie Behrendt RN         "

## 2023-11-26 ENCOUNTER — HEALTH MAINTENANCE LETTER (OUTPATIENT)
Age: 53
End: 2023-11-26

## 2024-01-08 ENCOUNTER — TELEPHONE (OUTPATIENT)
Dept: FAMILY MEDICINE | Facility: CLINIC | Age: 54
End: 2024-01-08
Payer: COMMERCIAL

## 2024-01-08 NOTE — TELEPHONE ENCOUNTER
Patient Quality Outreach    Patient is due for the following:   Colon Cancer Screening  Physical Preventive Adult Physical    Next Steps:   Schedule a Adult Preventative    Type of outreach:    Sent [employer] message.      Questions for provider review:    None           Torie Soto MA

## 2024-03-19 DIAGNOSIS — I10 ESSENTIAL HYPERTENSION: ICD-10-CM

## 2024-03-19 DIAGNOSIS — E78.5 HYPERLIPIDEMIA LDL GOAL <100: ICD-10-CM

## 2024-03-20 RX ORDER — ATORVASTATIN CALCIUM 20 MG/1
20 TABLET, FILM COATED ORAL DAILY
Qty: 90 TABLET | Refills: 1 | Status: SHIPPED | OUTPATIENT
Start: 2024-03-20 | End: 2024-08-22

## 2024-03-20 RX ORDER — LOSARTAN POTASSIUM 100 MG/1
100 TABLET ORAL DAILY
Qty: 90 TABLET | Refills: 1 | Status: SHIPPED | OUTPATIENT
Start: 2024-03-20 | End: 2024-08-22

## 2024-03-20 NOTE — TELEPHONE ENCOUNTER
Pending Prescriptions:                       Disp   Refills    atorvastatin (LIPITOR) 20 MG tablet [Phar*90 tab*1            Sig: TAKE 1 TABLET (20 MG) BY MOUTH DAILY    losartan (COZAAR) 100 MG tablet [Pharmacy*90 tab*1            Sig: TAKE 1 TABLET (100 MG) BY MOUTH DAILY    Routing refill request to provider for review/approval because:  GFR Estimate   Date Value Ref Range Status   09/09/2022 >90 >60 mL/min/1.73m2 Final     Comment:     Effective December 21, 2021 eGFRcr in adults is calculated using the 2021 CKD-EPI creatinine equation which includes age and gender (Juan et al., NEJM, DOI: 10.1056/YMGWyn9026239)   04/28/2021 >90 >60 mL/min/[1.73_m2] Final     Comment:     Non  GFR Calc  Starting 12/18/2018, serum creatinine based estimated GFR (eGFR) will be   calculated using the Chronic Kidney Disease Epidemiology Collaboration   (CKD-EPI) equation.       GFR Estimate If Black   Date Value Ref Range Status   04/28/2021 >90 >60 mL/min/[1.73_m2] Final     Comment:      GFR Calc  Starting 12/18/2018, serum creatinine based estimated GFR (eGFR) will be   calculated using the Chronic Kidney Disease Epidemiology Collaboration   (CKD-EPI) equation.       Potassium   Date Value Ref Range Status   09/09/2022 3.9 3.4 - 5.3 mmol/L Final   04/28/2021 3.6 3.4 - 5.3 mmol/L Final     LDL Cholesterol Calculated   Date Value Ref Range Status   09/09/2022 96 <=100 mg/dL Final   04/28/2021 145 (H) <100 mg/dL Final     Comment:     Above desirable:  100-129 mg/dl  Borderline High:  130-159 mg/dL  High:             160-189 mg/dL  Very high:       >189 mg/dl       Jayce Hudson RN

## 2024-05-13 ENCOUNTER — TELEPHONE (OUTPATIENT)
Dept: FAMILY MEDICINE | Facility: CLINIC | Age: 54
End: 2024-05-13
Payer: COMMERCIAL

## 2024-05-13 NOTE — TELEPHONE ENCOUNTER
Patient Quality Outreach    Patient is due for the following:   Colon Cancer Screening  Physical Preventive Adult Physical    Next Steps:   Schedule a Adult Preventative    Type of outreach:    Sent [employer] message.      Questions for provider review:    None           Torie Soto MA

## 2024-05-29 DIAGNOSIS — I10 ESSENTIAL HYPERTENSION: ICD-10-CM

## 2024-06-03 RX ORDER — NIFEDIPINE 30 MG/1
30 TABLET, EXTENDED RELEASE ORAL DAILY
Qty: 90 TABLET | Refills: 3 | Status: SHIPPED | OUTPATIENT
Start: 2024-06-03

## 2024-06-13 ENCOUNTER — VIRTUAL VISIT (OUTPATIENT)
Dept: PSYCHOLOGY | Facility: CLINIC | Age: 54
End: 2024-06-13
Payer: COMMERCIAL

## 2024-06-13 DIAGNOSIS — F33.1 MAJOR DEPRESSIVE DISORDER, RECURRENT, MODERATE (H): Primary | ICD-10-CM

## 2024-06-13 DIAGNOSIS — F10.91 ALCOHOL USE DISORDER IN REMISSION: ICD-10-CM

## 2024-06-13 PROCEDURE — 90791 PSYCH DIAGNOSTIC EVALUATION: CPT | Mod: 95 | Performed by: SOCIAL WORKER

## 2024-06-13 ASSESSMENT — ANXIETY QUESTIONNAIRES
6. BECOMING EASILY ANNOYED OR IRRITABLE: MORE THAN HALF THE DAYS
7. FEELING AFRAID AS IF SOMETHING AWFUL MIGHT HAPPEN: NOT AT ALL
GAD7 TOTAL SCORE: 2
GAD7 TOTAL SCORE: 2
IF YOU CHECKED OFF ANY PROBLEMS ON THIS QUESTIONNAIRE, HOW DIFFICULT HAVE THESE PROBLEMS MADE IT FOR YOU TO DO YOUR WORK, TAKE CARE OF THINGS AT HOME, OR GET ALONG WITH OTHER PEOPLE: NOT DIFFICULT AT ALL
1. FEELING NERVOUS, ANXIOUS, OR ON EDGE: NOT AT ALL
5. BEING SO RESTLESS THAT IT IS HARD TO SIT STILL: NOT AT ALL
3. WORRYING TOO MUCH ABOUT DIFFERENT THINGS: NOT AT ALL
2. NOT BEING ABLE TO STOP OR CONTROL WORRYING: NOT AT ALL

## 2024-06-13 ASSESSMENT — PATIENT HEALTH QUESTIONNAIRE - PHQ9
SUM OF ALL RESPONSES TO PHQ QUESTIONS 1-9: 11
10. IF YOU CHECKED OFF ANY PROBLEMS, HOW DIFFICULT HAVE THESE PROBLEMS MADE IT FOR YOU TO DO YOUR WORK, TAKE CARE OF THINGS AT HOME, OR GET ALONG WITH OTHER PEOPLE: VERY DIFFICULT
SUM OF ALL RESPONSES TO PHQ QUESTIONS 1-9: 11
5. POOR APPETITE OR OVEREATING: NOT AT ALL
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
## 2024-06-23 ENCOUNTER — HEALTH MAINTENANCE LETTER (OUTPATIENT)
Age: 54
End: 2024-06-23

## 2024-07-03 ENCOUNTER — VIRTUAL VISIT (OUTPATIENT)
Dept: PSYCHOLOGY | Facility: CLINIC | Age: 54
End: 2024-07-03
Payer: COMMERCIAL

## 2024-07-03 DIAGNOSIS — F10.91 ALCOHOL USE DISORDER IN REMISSION: ICD-10-CM

## 2024-07-03 DIAGNOSIS — F33.1 MAJOR DEPRESSIVE DISORDER, RECURRENT, MODERATE (H): Primary | ICD-10-CM

## 2024-07-03 PROCEDURE — 90834 PSYTX W PT 45 MINUTES: CPT | Mod: 95 | Performed by: SOCIAL WORKER

## 2024-07-03 NOTE — PROGRESS NOTES
[facility]                                     Progress Note    Patient Name: Juan Pablo Abraham  Date: 7/3/2024         Service Type: Individual      Session Start Time: 8:01am  Session End Time: 8:44am     Session Length: 43 minutes    Session #: 2    Attendees: Client    Service Modality:  Video Visit:      Provider verified identity through the following two step process.  Patient provided:  Patient is known previously to provider and Patient was verified at admission/transfer    Telemedicine Visit: The patient's condition can be safely assessed and treated via synchronous audio and visual telemedicine encounter.      Reason for Telemedicine Visit: Patient has requested telehealth visit    Originating Site (Patient Location): Patient's home    Distant Site (Provider Location): Provider Remote Setting- Home Office    Consent:  The patient/guardian has verbally consented to: the potential risks and benefits of telemedicine (video visit) versus in person care; bill my insurance or make self-payment for services provided; and responsibility for payment of non-covered services.     Patient would like the video invitation sent by:  Send to e-mail at: [email or web address]    Mode of Communication:  [facility]    Distant Location (Provider):  Off-site    As the provider I attest to compliance with applicable laws and regulations related to telemedicine.    DATA  Interactive Complexity: No  Crisis: No        Progress Since Last Session (Related to Symptoms / Goals / Homework):   Symptoms: No change Patient does not report any significant changes to the reported symptoms and presenting concerns since the time of his previous diagnostic assessment appointment.     Homework: N/A--no homework was assigned, as previous appointment was a diagnostic assessment.      Episode of Care Goals: N/A--treatment plan goals and objectives were developed during today's appointment.     Current / Ongoing  "Stressors and Concerns:   Patient reports that he was referred for therapy by his  with the [facility] program, following him completing inpatient treatment at [facility] last year, as well as aftercare services. He reports that he has been sober for 1 year now. Patient notes experiencing some guilt in regard to multiple areas of life, as well as some self-critical thinking patterns, both of which impact his mental health. He reports that he was never able to have biological children of his own, which has been a source of stress for him, and has impacted his mental health as well. Patient reports that he has been  to his wife for 24 years, and notes some occasional stress in their relationship, though a good relationship overall at this time. Patient reports no significant changes to the reported symptoms and presenting concerns since the time of the previous diagnostic assessment appointment. He reports that he has been doing well overall, though has also been thinking more often about things that he has done in the past, that \"aren't so great.\" He reports that \"this has been a new experience for me,\" which has brought up some mixed emotions, as he continues to do some more self-reflection in sobriety. He reports that he is often hard on himself about \"not being as good of a person as I should be, and in my head a lot.\" Patient also reports that he has noticed he is \"still not doing the things that I used to enjoy.\" In particular, he reports that he often notices himself talking himself out of doing things that he used to enjoy, and would like to start engaging in these activities again.      Treatment Objective(s) Addressed in This Session:   N/A--treatment plan goals and objectives were developed during today's appointment.      Intervention:   Motivational Interviewing    MI Intervention: Expressed Empathy/Understanding, Supported Autonomy, Collaboration, Evocation, Open-ended questions, Reflections: " simple and complex, Change talk (evoked), and Reframe     Change Talk Expressed by the Patient: Desire to change Ability to change Reasons to change Need to change Committment to change Activation Taking steps    Provider Response to Change Talk: E - Evoked more info from patient about behavior change, A - Affirmed patient's thoughts, decisions, or attempts at behavior change, R - Reflected patient's change talk, and S - Summarized patient's change talk statements    Psychodynamic: This writer used open ended questions and reflective listening to assist patient in processing his thoughts and emotions as related to the reported symptoms and presenting concerns.     Assessments completed prior to visit:  The following assessments were completed by patient for this visit:  PHQ9:       4/25/2023     5:04 PM 6/13/2024     9:28 AM   PHQ-9 SCORE   PHQ-9 Total Score MyChart  11 (Moderate depression)   PHQ-9 Total Score 12 11     GAD7:       4/25/2023     5:04 PM 6/13/2024    11:05 AM   KONRAD-7 SCORE   Total Score 5 2         ASSESSMENT: Current Emotional / Mental Status (status of significant symptoms):   Risk status (Self / Other harm or suicidal ideation)   Patient denies current fears or concerns for personal safety.   Patient denies current or recent suicidal ideation or behaviors.   Patient denies current or recent homicidal ideation or behaviors.   Patient denies current or recent self injurious behavior or ideation.   Patient denies other safety concerns.   Patient reports there has been no change in risk factors since their last session.     Patient reports there has been no change in protective factors since their last session.     Recommended that patient call 911 or go to the local ED should there be a change in any of these risk factors.     Appearance:   Appropriate    Eye Contact:   Good    Psychomotor Behavior: Normal    Attitude:   Cooperative  Interested Pleasant   Orientation:   All   Speech    Rate /  Production: Normal     Volume:  Normal    Mood:    Anxious  Normal   Affect:    Appropriate    Thought Content:  Clear    Thought Form:  Coherent  Logical    Insight:    Good      Medication Review:   No current psychiatric medications prescribed     Medication Compliance:   NA     Changes in Health Issues:   None reported     Chemical Use Review:   Substance Use: Chemical use reviewed, no active concerns identified      Tobacco Use: No current tobacco use.      Diagnosis:  F33.1 Major Depressive Disorder, recurrent episode, moderate  F10.91 Alcohol Use Disorder, in remission    Collateral Reports Completed:   Not Applicable    PLAN: (Patient Tasks / Therapist Tasks / Other)  Between now and his next appointment, patient will prioritize spending time in at least one activity he enjoys (fishing, golfing, etc).         Deann Duarte, [facility]                                                         ______________________________________________________________________    Individual Treatment Plan    Patient's Name: Juan Pablo Abraham  YOB: 1970    Date of Creation: 7/3/2024  Date Treatment Plan Last Reviewed/Revised: 7/3/2024 (initial treatment plan)    DSM5 Diagnoses: 296.32 (F33.1) Major Depressive Disorder, Recurrent Episode, Moderate _ or Substance-Related & Addictive Disorders Alcohol Use Disorder   303.90 (F10.20) Moderate In sustained remission  Psychosocial / Contextual Factors: Patient reports that he was referred for therapy by his  with the [facility] program, following him completing inpatient treatment at [facility] last year, as well as aftercare services. He reports that he has been sober for 1 year now. Patient notes experiencing some guilt in regard to multiple areas of life, as well as some self-critical thinking patterns, both of which impact his mental health. He reports that he was never able to have biological children of his own, which has been a source of stress for him, and has  "impacted his mental health as well. Patient reports that he has been  to his wife for 24 years, and notes some occasional stress in their relationship, though a good relationship overall at this time.   PROMIS (reviewed every 90 days): completed on 6/13/2024, with a score of 27    Referral / Collaboration:  Referral to another professional/service is not indicated at this time..    Anticipated number of session for this episode of care:  18-24 sessions  Anticipation frequency of session: Weekly  Anticipated Duration of each session: 38-52 minutes  Treatment plan will be reviewed in 90 days or when goals have been changed.       MeasurableTreatment Goal(s) related to diagnosis / functional impairment(s)  Goal 1: Patient will further develop helpful coping skills to assist him with management of depression and anxiety symptoms, and practice using these skills at least 1-2 times daily, as reported by him, over 3 months' time.   \"I will know I've met my goal when I notice myself having a better outlook on life.\"     Objective #A (Patient Action)    Patient will identify at least 3-4 worries or thought patterns that contribute to feeling anxious or depressed.  Status: New - Date: 7/3/2024       Intervention(s)  Therapist will ask patient to track his symptoms, to identify any potential patterns with thoughts, emotions, triggers, situations/circumstances.     Objective #B  Patient will learn at least 3-4 new coping skills for anxiety and depression symptom management, and will practice using these skills at least 1-2 times daily.  Status: New - Date: 7/3/2024       Intervention(s)  Therapist will teach and practice mindfulness based and cognitive coping skills in session with patient, and will ask him to practice these skills in between appointments as well.     Goal 2: Patient will be able to effectively manage self-critical and other unhelpful thought patterns, as they occur, at least 1-2 times daily, as reported " "by him, over 3 months' time.  \"I will know I've met my goal when I feel better about myself, and am not so hard on myself.\"     Objective #A (Patient Action)    Status: New - Date: 7/3/2024     Patient will be able to identify 5-15 characteristics  or strengths that he likes about herself.    Intervention(s)  Therapist will ask patient to identify characteristics or strengths that he or others have noticed throughout his lifetime.     Objective #B  Patient will be able to identify two areas of life that he would like to have improved functioning.    Status: New - Date: 7/3/2024     Intervention(s)  Therapist will assist patient in identifying achievable goals for improved functioning, as well as ways in which he might utilize his strengths and abilities to pursue those goals.     Objective #C  Patient will practice the use of self-affirming language, at least 1-2 times daily.  Status: New - Date: 7/3/2024     Intervention(s)  Therapist will assist patient in identifying language/phrases that focus on his strengths and abilities, and resonate with him as affirming, will practice using this language in session with him, and will ask him to practice using this language outside of session as well.       Patient has reviewed and agreed to the above plan.      Deann Duarte, [facility]  July 3, 2024   "

## 2024-07-17 ENCOUNTER — VIRTUAL VISIT (OUTPATIENT)
Dept: PSYCHOLOGY | Facility: CLINIC | Age: 54
End: 2024-07-17
Payer: COMMERCIAL

## 2024-07-17 DIAGNOSIS — F33.1 MAJOR DEPRESSIVE DISORDER, RECURRENT, MODERATE (H): Primary | ICD-10-CM

## 2024-07-17 DIAGNOSIS — F10.91 ALCOHOL USE DISORDER IN REMISSION: ICD-10-CM

## 2024-07-17 PROCEDURE — 90834 PSYTX W PT 45 MINUTES: CPT | Mod: 95 | Performed by: SOCIAL WORKER

## 2024-07-21 ENCOUNTER — MYC REFILL (OUTPATIENT)
Dept: FAMILY MEDICINE | Facility: CLINIC | Age: 54
End: 2024-07-21
Payer: COMMERCIAL

## 2024-07-21 DIAGNOSIS — R39.9 LOWER URINARY TRACT SYMPTOMS (LUTS): ICD-10-CM

## 2024-07-22 RX ORDER — TAMSULOSIN HYDROCHLORIDE 0.4 MG/1
0.4 CAPSULE ORAL DAILY
Qty: 90 CAPSULE | Refills: 3 | Status: SHIPPED | OUTPATIENT
Start: 2024-07-22

## 2024-07-22 NOTE — PROGRESS NOTES
[facility]                                     Progress Note    Patient Name: Juan Pablo Abraham  Date: 7/17/2024         Service Type: Individual      Session Start Time: 8:02am  Session End Time: 8:48am     Session Length: 46 minutes    Session #: 3    Attendees: Client    Service Modality:  Video Visit:      Provider verified identity through the following two step process.  Patient provided:  Patient is known previously to provider and Patient was verified at admission/transfer    Telemedicine Visit: The patient's condition can be safely assessed and treated via synchronous audio and visual telemedicine encounter.      Reason for Telemedicine Visit: Patient has requested telehealth visit    Originating Site (Patient Location): Patient's home    Distant Site (Provider Location): Provider Remote Setting- Home Office    Consent:  The patient/guardian has verbally consented to: the potential risks and benefits of telemedicine (video visit) versus in person care; bill my insurance or make self-payment for services provided; and responsibility for payment of non-covered services.     Patient would like the video invitation sent by:  Send to e-mail at: [email or web address]    Mode of Communication:  [facility] Conference [facility]    Distant Location (Provider):  Off-site    As the provider I attest to compliance with applicable laws and regulations related to telemedicine.    DATA  Interactive Complexity: No  Crisis: No        Progress Since Last Session (Related to Symptoms / Goals / Homework):   Symptoms: Improving Patient reports some improvement to the reported symptoms and presenting concerns since the time of her previous appointment.     Homework: Achieved / completed to satisfaction      Episode of Care Goals: Satisfactory progress - ACTION (Actively working towards change); Intervened by reinforcing change plan / affirming steps taken     Current / Ongoing Stressors and Concerns:    Patient  "reports that he was referred for therapy by his  with the [facility] program, following him completing inpatient treatment at [facility] last year, as well as aftercare services. He reports that he has been sober for 1 year now. Patient notes experiencing some guilt in regard to multiple areas of life, as well as some self-critical thinking patterns, both of which impact his mental health. He reports that he was never able to have biological children of his own, which has been a source of stress for him, and has impacted his mental health as well. Patient reports that he has been  to his wife for 24 years, and notes some occasional stress in their relationship, though a good relationship overall at this time. Patient reports some improvement to the reported symptoms and presenting concerns since the time of the previous appointment. He reports that he has been doing well overall, and made some progress on household projects as well as working toward getting his boat out again. He reports that he noticed as he continued to do more projects, both his mood and level of motivation improved, which felt good. He notes \"starting to feel more optimistic\" in general, and to think about how much he would enjoy fishing again. Patient reports that he and his wife also had a good conversation recently, where they were able to talk through some things that have been challenging in their relationship over the years. In particular, patient reports that he feels as though his wife was able to see his perspective on things, which was unexpected and also felt hopeful.      Treatment Objective(s) Addressed in This Session:   1) Patient will identify at least 3-4 worries or thought patterns that contribute to feeling anxious or depressed.  2) Patient will learn at least 3-4 new coping skills for anxiety and depression symptom management, and will practice using these skills at least 1-2 times daily.  3) Patient will be able " to identify 5-15 characteristics  or strengths that he likes about himself.  4) Patient will be able to identify two areas of life that he would like to have improved functioning.  5) Patient will practice the use of self-affirming language, at least 1-2 times daily.     Intervention:    Motivational Interviewing     MI Intervention: Expressed Empathy/Understanding, Supported Autonomy, Collaboration, Evocation, Open-ended questions, Reflections: simple and complex, Change talk (evoked), and Reframe      Change Talk Expressed by the Patient: Desire to change Ability to change Reasons to change Need to change Committment to change Activation Taking steps     Provider Response to Change Talk: E - Evoked more info from patient about behavior change, A - Affirmed patient's thoughts, decisions, or attempts at behavior change, R - Reflected patient's change talk, and S - Summarized patient's change talk statements     Psychodynamic: This writer used open ended questions and reflective listening to assist patient in processing his thoughts and emotions as related to the reported symptoms and presenting concerns.     Assessments completed prior to visit:  The following assessments were completed by patient for this visit:  PHQ9:       4/25/2023     5:04 PM 6/13/2024     9:28 AM   PHQ-9 SCORE   PHQ-9 Total Score MyChart  11 (Moderate depression)   PHQ-9 Total Score 12 11     GAD7:       4/25/2023     5:04 PM 6/13/2024    11:05 AM   KONRAD-7 SCORE   Total Score 5 2         ASSESSMENT: Current Emotional / Mental Status (status of significant symptoms):   Risk status (Self / Other harm or suicidal ideation)   Patient denies current fears or concerns for personal safety.   Patient denies current or recent suicidal ideation or behaviors.   Patient denies current or recent homicidal ideation or behaviors.   Patient denies current or recent self injurious behavior or ideation.   Patient denies other safety concerns.   Patient reports  there has been no change in risk factors since their last session.     Patient reports there has been no change in protective factors since their last session.     Recommended that patient call 911 or go to the local ED should there be a change in any of these risk factors.     Appearance:   Appropriate    Eye Contact:   Good    Psychomotor Behavior: Normal    Attitude:   Cooperative  Interested Attentive   Orientation:   All   Speech    Rate / Production: Normal     Volume:  Normal    Mood:    Anxious  Normal   Affect:    Appropriate    Thought Content:  Clear    Thought Form:  Coherent  Logical    Insight:    Good      Medication Review:   No current psychiatric medications prescribed     Medication Compliance:   NA     Changes in Health Issues:   None reported     Chemical Use Review:   Substance Use: Chemical use reviewed, no active concerns identified      Tobacco Use: No current tobacco use.      Diagnosis:  F33.1 Major Depressive Disorder, recurrent episode, moderate  F10.91 Alcohol Use Disorder, in remission       Collateral Reports Completed:   Not Applicable    PLAN: (Patient Tasks / Therapist Tasks / Other)  Between now and his next appointment, patient will continue to practice prioritizing spending time in at least one activity he enjoys (fishing, golfing, etc).       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 He reports that he has been sober for 1 year now. Patient notes experiencing some guilt in regard to multiple areas of life, as well as some self-critical thinking patterns, both of which impact his mental health. He reports that he was never able to have biological children of his own, which has been a source of stress for him, and has impacted his mental health as well. Patient reports that he has been  to his wife for 24 years, and notes some occasional stress in their relationship, though a good relationship overall at this time.   PROMIS (reviewed every 90 days): completed on 6/13/2024, with a score of 27     Referral / Collaboration:  Referral to another professional/service is not indicated at this time..     Anticipated number of session for this episode of care:  18-24 sessions  Anticipation frequency of session: Weekly  Anticipated Duration of each session: 38-52 minutes  Treatment plan will be reviewed in 90 days or when goals have been changed.         MeasurableTreatment Goal(s) related to diagnosis / functional impairment(s)  Goal 1: Patient will further develop helpful coping skills to assist him with management of depression and anxiety symptoms, and practice using these skills at least 1-2 times daily, as reported by him, over 3 months' time.   \"I will know I've met my goal when I notice myself having a better outlook on life.\"     Objective #A (Patient Action)                        Patient will identify at least 3-4 worries or thought patterns that contribute to feeling anxious or depressed.  Status: New - Date: 7/3/2024       Intervention(s)  Therapist will ask patient to track his symptoms, to identify any potential patterns with thoughts, emotions, triggers, situations/circumstances.     Objective #B  Patient will learn at least 3-4 new " "coping skills for anxiety and depression symptom management, and will practice using these skills at least 1-2 times daily.  Status: New - Date: 7/3/2024       Intervention(s)  Therapist will teach and practice mindfulness based and cognitive coping skills in session with patient, and will ask him to practice these skills in between appointments as well.      Goal 2: Patient will be able to effectively manage self-critical and other unhelpful thought patterns, as they occur, at least 1-2 times daily, as reported by him, over 3 months' time.  \"I will know I've met my goal when I feel better about myself, and am not so hard on myself.\"      Objective #A (Patient Action)                          Status: New - Date: 7/3/2024      Patient will be able to identify 5-15 characteristics  or strengths that he likes about himself.     Intervention(s)  Therapist will ask patient to identify characteristics or strengths that he or others have noticed throughout his lifetime.      Objective #B  Patient will be able to identify two areas of life that he would like to have improved functioning.               Status: New - Date: 7/3/2024      Intervention(s)  Therapist will assist patient in identifying achievable goals for improved functioning, as well as ways in which he might utilize his strengths and abilities to pursue those goals.      Objective #C  Patient will practice the use of self-affirming language, at least 1-2 times daily.  Status: New - Date: 7/3/2024      Intervention(s)  Therapist will assist patient in identifying language/phrases that focus on his strengths and abilities, and resonate with him as affirming, will practice using this language in session with him, and will ask him to practice using this language outside of session as well.         Patient has reviewed and agreed to the above plan.        Deann Duarte, [facility]                 July 3, 2024     "

## 2024-08-22 ENCOUNTER — OFFICE VISIT (OUTPATIENT)
Dept: FAMILY MEDICINE | Facility: CLINIC | Age: 54
End: 2024-08-22
Payer: COMMERCIAL

## 2024-08-22 VITALS
SYSTOLIC BLOOD PRESSURE: 124 MMHG | DIASTOLIC BLOOD PRESSURE: 84 MMHG | OXYGEN SATURATION: 95 % | WEIGHT: 267 LBS | BODY MASS INDEX: 32.51 KG/M2 | HEART RATE: 84 BPM | RESPIRATION RATE: 20 BRPM | TEMPERATURE: 97.7 F | HEIGHT: 76 IN

## 2024-08-22 DIAGNOSIS — J45.40 MODERATE PERSISTENT ASTHMA WITHOUT COMPLICATION: ICD-10-CM

## 2024-08-22 DIAGNOSIS — I10 ESSENTIAL HYPERTENSION: ICD-10-CM

## 2024-08-22 DIAGNOSIS — F33.42 RECURRENT MAJOR DEPRESSIVE DISORDER, IN FULL REMISSION (H): ICD-10-CM

## 2024-08-22 DIAGNOSIS — Z00.00 ROUTINE GENERAL MEDICAL EXAMINATION AT A HEALTH CARE FACILITY: Primary | ICD-10-CM

## 2024-08-22 DIAGNOSIS — E78.5 HYPERLIPIDEMIA LDL GOAL <100: ICD-10-CM

## 2024-08-22 DIAGNOSIS — Z12.11 SCREEN FOR COLON CANCER: ICD-10-CM

## 2024-08-22 DIAGNOSIS — E11.9 TYPE 2 DIABETES MELLITUS WITHOUT COMPLICATION, WITHOUT LONG-TERM CURRENT USE OF INSULIN (H): ICD-10-CM

## 2024-08-22 PROBLEM — F33.9 MAJOR DEPRESSION, RECURRENT (H): Status: RESOLVED | Noted: 2024-08-22 | Resolved: 2024-08-22

## 2024-08-22 PROBLEM — F33.9 MAJOR DEPRESSION, RECURRENT (H): Status: ACTIVE | Noted: 2024-08-22

## 2024-08-22 LAB
ANION GAP SERPL CALCULATED.3IONS-SCNC: 9 MMOL/L (ref 7–15)
BUN SERPL-MCNC: 17.8 MG/DL (ref 6–20)
CALCIUM SERPL-MCNC: 9.6 MG/DL (ref 8.8–10.4)
CHLORIDE SERPL-SCNC: 111 MMOL/L (ref 98–107)
CHOLEST SERPL-MCNC: 176 MG/DL
CREAT SERPL-MCNC: 1.03 MG/DL (ref 0.67–1.17)
CREAT UR-MCNC: 129.4 MG/DL
EGFRCR SERPLBLD CKD-EPI 2021: 86 ML/MIN/1.73M2
FASTING STATUS PATIENT QL REPORTED: YES
FASTING STATUS PATIENT QL REPORTED: YES
GLUCOSE SERPL-MCNC: 175 MG/DL (ref 70–99)
HBA1C MFR BLD: 7 % (ref 0–5.6)
HCO3 SERPL-SCNC: 23 MMOL/L (ref 22–29)
HDLC SERPL-MCNC: 30 MG/DL
LDLC SERPL CALC-MCNC: 122 MG/DL
MICROALBUMIN UR-MCNC: <12 MG/L
MICROALBUMIN/CREAT UR: NORMAL MG/G{CREAT}
NONHDLC SERPL-MCNC: 146 MG/DL
POTASSIUM SERPL-SCNC: 3.9 MMOL/L (ref 3.4–5.3)
SODIUM SERPL-SCNC: 143 MMOL/L (ref 135–145)
TRIGL SERPL-MCNC: 122 MG/DL

## 2024-08-22 PROCEDURE — 96127 BRIEF EMOTIONAL/BEHAV ASSMT: CPT | Performed by: FAMILY MEDICINE

## 2024-08-22 PROCEDURE — 99396 PREV VISIT EST AGE 40-64: CPT | Performed by: FAMILY MEDICINE

## 2024-08-22 PROCEDURE — 82043 UR ALBUMIN QUANTITATIVE: CPT | Performed by: FAMILY MEDICINE

## 2024-08-22 PROCEDURE — 83036 HEMOGLOBIN GLYCOSYLATED A1C: CPT | Performed by: FAMILY MEDICINE

## 2024-08-22 PROCEDURE — 82570 ASSAY OF URINE CREATININE: CPT | Performed by: FAMILY MEDICINE

## 2024-08-22 PROCEDURE — 99214 OFFICE O/P EST MOD 30 MIN: CPT | Mod: 25 | Performed by: FAMILY MEDICINE

## 2024-08-22 PROCEDURE — 36415 COLL VENOUS BLD VENIPUNCTURE: CPT | Performed by: FAMILY MEDICINE

## 2024-08-22 PROCEDURE — 80048 BASIC METABOLIC PNL TOTAL CA: CPT | Performed by: FAMILY MEDICINE

## 2024-08-22 PROCEDURE — 80061 LIPID PANEL: CPT | Performed by: FAMILY MEDICINE

## 2024-08-22 RX ORDER — ATORVASTATIN CALCIUM 20 MG/1
20 TABLET, FILM COATED ORAL DAILY
Qty: 90 TABLET | Refills: 3 | Status: SHIPPED | OUTPATIENT
Start: 2024-08-22

## 2024-08-22 RX ORDER — ALBUTEROL SULFATE 90 UG/1
2 AEROSOL, METERED RESPIRATORY (INHALATION) EVERY 4 HOURS PRN
Qty: 8.5 G | Refills: 11 | Status: SHIPPED | OUTPATIENT
Start: 2024-08-22

## 2024-08-22 RX ORDER — LOSARTAN POTASSIUM 100 MG/1
100 TABLET ORAL DAILY
Qty: 90 TABLET | Refills: 3 | Status: SHIPPED | OUTPATIENT
Start: 2024-08-22

## 2024-08-22 RX ORDER — BUDESONIDE AND FORMOTEROL FUMARATE DIHYDRATE 160; 4.5 UG/1; UG/1
AEROSOL RESPIRATORY (INHALATION)
Qty: 10 G | Refills: 11 | Status: SHIPPED | OUTPATIENT
Start: 2024-08-22

## 2024-08-22 RX ORDER — MONTELUKAST SODIUM 10 MG/1
10 TABLET ORAL AT BEDTIME
Qty: 90 TABLET | Refills: 3 | Status: SHIPPED | OUTPATIENT
Start: 2024-08-22

## 2024-08-22 SDOH — HEALTH STABILITY: PHYSICAL HEALTH: ON AVERAGE, HOW MANY DAYS PER WEEK DO YOU ENGAGE IN MODERATE TO STRENUOUS EXERCISE (LIKE A BRISK WALK)?: 1 DAY

## 2024-08-22 SDOH — HEALTH STABILITY: PHYSICAL HEALTH: ON AVERAGE, HOW MANY MINUTES DO YOU ENGAGE IN EXERCISE AT THIS LEVEL?: 10 MIN

## 2024-08-22 ASSESSMENT — ASTHMA QUESTIONNAIRES
ACT_TOTALSCORE: 14
ACT_TOTALSCORE: 14
QUESTION_3 LAST FOUR WEEKS HOW OFTEN DID YOUR ASTHMA SYMPTOMS (WHEEZING, COUGHING, SHORTNESS OF BREATH, CHEST TIGHTNESS OR PAIN) WAKE YOU UP AT NIGHT OR EARLIER THAN USUAL IN THE MORNING: ONCE A WEEK
QUESTION_1 LAST FOUR WEEKS HOW MUCH OF THE TIME DID YOUR ASTHMA KEEP YOU FROM GETTING AS MUCH DONE AT WORK, SCHOOL OR AT HOME: A LITTLE OF THE TIME
QUESTION_4 LAST FOUR WEEKS HOW OFTEN HAVE YOU USED YOUR RESCUE INHALER OR NEBULIZER MEDICATION (SUCH AS ALBUTEROL): THREE OR MORE TIMES PER DAY
QUESTION_5 LAST FOUR WEEKS HOW WOULD YOU RATE YOUR ASTHMA CONTROL: SOMEWHAT CONTROLLED
QUESTION_2 LAST FOUR WEEKS HOW OFTEN HAVE YOU HAD SHORTNESS OF BREATH: THREE TO SIX TIMES A WEEK

## 2024-08-22 ASSESSMENT — SOCIAL DETERMINANTS OF HEALTH (SDOH): HOW OFTEN DO YOU GET TOGETHER WITH FRIENDS OR RELATIVES?: ONCE A WEEK

## 2024-08-22 ASSESSMENT — PATIENT HEALTH QUESTIONNAIRE - PHQ9
SUM OF ALL RESPONSES TO PHQ QUESTIONS 1-9: 3
10. IF YOU CHECKED OFF ANY PROBLEMS, HOW DIFFICULT HAVE THESE PROBLEMS MADE IT FOR YOU TO DO YOUR WORK, TAKE CARE OF THINGS AT HOME, OR GET ALONG WITH OTHER PEOPLE: NOT DIFFICULT AT ALL
SUM OF ALL RESPONSES TO PHQ QUESTIONS 1-9: 3

## 2024-08-22 ASSESSMENT — PAIN SCALES - GENERAL: PAINLEVEL: MODERATE PAIN (5)

## 2024-08-22 NOTE — PROGRESS NOTES
"Preventive Care Visit  [facility]  Aly Camargo MD, Family Medicine  Aug 22, 2024        Assessment & Plan     Routine general medical examination at a health care facility  Patient was advised on recommended screening and preventive health recommendations.  He verbalized understanding and agreed to the plans below.     Type 2 diabetes mellitus without complication, without long-term current use of insulin (H)  Recheck labs today. If out of goal A1c, discuss treatment.  Reinforced carbohydrate control.  Regular exercise 30 mins per day, 3 times a week.  Regular foot care, annual eye exam.  Flu vaccine every year.   - Lipid panel reflex to direct LDL Fasting  - HEMOGLOBIN A1C  - Albumin Random Urine Quantitative with Creat Ratio    Moderate persistent asthma without complication  Stable per patient.  Reminded patient on recommended immunizations which he will complete in the fall.  - montelukast (SINGULAIR) 10 MG tablet  Dispense: 90 tablet; Refill: 3  - budesonide-formoterol (SYMBICORT) 160-4.5 MCG/ACT Inhaler  Dispense: 10 g; Refill: 11  - albuterol (PROAIR HFA/PROVENTIL HFA/VENTOLIN HFA) 108 (90 Base) MCG/ACT inhaler  Dispense: 8.5 g; Refill: 11    Essential hypertension  Controlled.  Low salt, low fat diet.   Exercise as tolerated.  Take meds as prescribed; call if with side effects.    - losartan (COZAAR) 100 MG tablet  Dispense: 90 tablet; Refill: 3  - BASIC METABOLIC PANEL    Hyperlipidemia LDL goal <100  Reinforced heart healthy lifestyle.  Recheck lab today.  - atorvastatin (LIPITOR) 20 MG tablet  Dispense: 90 tablet; Refill: 3    Screen for colon cancer  Scheduled already for colonoscopy      Patient has been advised of split billing requirements and indicates understanding: Yes        BMI  Estimated body mass index is 32.5 kg/m  as calculated from the following:    Height as of this encounter: 1.93 m (6' 4\").    Weight as of this encounter: 121.1 kg (267 lb).   Weight " management plan: Discussed healthy diet and exercise guidelines    Counseling  Appropriate preventive services were addressed with this patient via screening, questionnaire, or discussion as appropriate for fall prevention, nutrition, physical activity, Tobacco-use cessation, social engagement, weight loss and cognition.  Checklist reviewing preventive services available has been given to the patient.  Reviewed patient's diet, addressing concerns and/or questions.   He is at risk for lack of exercise and has been provided with information to increase physical activity for the benefit of his well-being.   The patient was instructed to see the dentist every 6 months.   He is at risk for psychosocial distress and has been provided with information to reduce risk.       Patient Instructions   Patient Education  Preventive Care Advice   This is general advice given by our system to help you stay healthy. However, your care team may have specific advice just for you. Please talk to your care team about your preventive care needs.  Nutrition  Eat 5 or more servings of fruits and vegetables each day.  Try wheat bread, brown rice and whole grain pasta (instead of white bread, rice, and pasta).  Get enough calcium and vitamin D. Check the label on foods and aim for 100% of the RDA (recommended daily allowance).  Lifestyle  Exercise at least 150 minutes each week  (30 minutes a day, 5 days a week).  Do muscle strengthening activities 2 days a week. These help control your weight and prevent disease.  No smoking.  Wear sunscreen to prevent skin cancer.  Have a dental exam and cleaning every 6 months.  Yearly exams  See your health care team every year to talk about:  Any changes in your health.  Any medicines your care team has prescribed.  Preventive care, family planning, and ways to prevent chronic diseases.  Shots (vaccines)   HPV shots (up to age 26), if you've never had them before.  Hepatitis B shots (up to age 59), if  you've never had them before.  COVID-19 shot: Get this shot when it's due.  Flu shot: Get a flu shot every year.  Tetanus shot: Get a tetanus shot every 10 years.  Pneumococcal, hepatitis A, and RSV shots: Ask your care team if you need these based on your risk.  Shingles shot (for age 50 and up)  General health tests  Diabetes screening:  Starting at age 35, Get screened for diabetes at least every 3 years.  If you are younger than age 35, ask your care team if you should be screened for diabetes.  Cholesterol test: At age 39, start having a cholesterol test every 5 years, or more often if advised.  Bone density scan (DEXA): At age 50, ask your care team if you should have this scan for osteoporosis (brittle bones).  Hepatitis C: Get tested at least once in your life.  STIs (sexually transmitted infections)  Before age 24: Ask your care team if you should be screened for STIs.  After age 24: Get screened for STIs if you're at risk. You are at risk for STIs (including HIV) if:  You are sexually active with more than one person.  You don't use condoms every time.  You or a partner was diagnosed with a sexually transmitted infection.  If you are at risk for HIV, ask about PrEP medicine to prevent HIV.  Get tested for HIV at least once in your life, whether you are at risk for HIV or not.  Cancer screening tests  Cervical cancer screening: If you have a cervix, begin getting regular cervical cancer screening tests starting at age 21.  Breast cancer scan (mammogram): If you've ever had breasts, begin having regular mammograms starting at age 40. This is a scan to check for breast cancer.  Colon cancer screening: It is important to start screening for colon cancer at age 45.  Have a colonoscopy test every 10 years (or more often if you're at risk) Or, ask your provider about stool tests like a FIT test every year or Cologuard test every 3 years.  To learn more about your testing options, visit:   .  For help making a  decision, visit:   https://bit.ly/tc65067.  Prostate cancer screening test: If you have a prostate, ask your care team if a prostate cancer screening test (PSA) at age 55 is right for you.  Lung cancer screening: If you are a current or former smoker ages 50 to 80, ask your care team if ongoing lung cancer screenings are right for you.  For informational purposes only. Not to replace the advice of your health care provider. Copyright   2023 [employer]. All rights reserved. Clinically reviewed by the [facility] Transitions Program. [place] [id number] - REV 01/24.         Rajesh Jang is a 54 year old, presenting for the following:  Physical        8/22/2024     7:39 AM   Additional Questions   Roomed by Ana Maria JUAREZ   Accompanied by self         8/22/2024   Declines Weight   Did patient decline having their weight taken? Yes           Health Care Directive  Patient does not have a Health Care Directive or Living Will: Discussed advance care planning with patient; however, patient declined at this time.    HPI    Pain History:  When did you first notice your pain? 8 months ago   Have you seen this provider for your pain in the past? No   Where in your body do your have pain? Shoulders, bilateral  Are you seeing anyone else for your pain? No  What makes your pain better? rest  What makes your pain worse? Stretching arms outward, lifting arms above head  How has pain affected your ability to work? 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 Family  Patient denies new activities. Patient cannot recall any specific injuries.  Patient can bench press but cannot do shoulder presses.        4/25/2023     5:04 PM 6/13/2024     9:28 AM 8/22/2024     6:53 AM   PHQ-9 SCORE   PHQ-9 Total Score MyChart  11 (Moderate depression) 3 (Minimal depression)   PHQ-9 Total Score 12 11 3           4/25/2023     5:04 PM 6/13/2024     11:05 AM   KONRAD-7 SCORE   Total Score 5 2       Chronic Pain - Initial Assessment:    How would you describe your pain? Tight, almost like cramy  Have you had any recent changes to the severity or character of your pain? no  Is there an underlying cause that has been identified? no  Has your ability to work or do daily activities changed recently because of your pain? Can't do overhead motions  Which of these pain treatments have you tried? none  Previous medication treatments:  NSAIDs - ibuprofen (Motrin)             No data to display              Average probability of overdose or serious opioid-induced respiratory depression in the next six months:   Points    Risk   0-4    2%   5-7    5%   8-9    7%   10-17    15%   18-25    30%   26-41    55%   42    83%        8/22/2024   General Health   How would you rate your overall physical health? (!) FAIR   Feel stress (tense, anxious, or unable to sleep) Only a little      (!) STRESS CONCERN      8/22/2024   Nutrition   Three or more servings of calcium each day? Yes   Diet: Regular (no restrictions)   How many servings of fruit and vegetables per day? (!) 2-3   How many sweetened beverages each day? 0-1            8/22/2024   Exercise   Days per week of moderate/strenous exercise 1 day   Average minutes spent exercising at this level 10 min      (!) EXERCISE CONCERN      8/22/2024   Social Factors   Frequency of gathering with friends or relatives Once a week   Worry food won't last until get money to buy more No   Food not last or not have enough money for food? No   Do you have housing? (Housing is defined as stable permanent housing and does not include staying ouside in a car, in a tent, in an abandoned building, in an overnight shelter, or couch-surfing.) Yes   Are you worried about losing your housing? No   Lack of transportation? No   Unable to get utilities (heat,electricity)? No            8/22/2024   Fall Risk   Fallen 2 or more times in the past year? No    Trouble with walking or balance? No             8/22/2024   Dental   Dentist two times every year? (!) NO            8/22/2024   TB Screening   Were you born outside of the [place]? No      Today's PHQ-9 Score:       8/22/2024     6:53 AM   PHQ-9 SCORE   PHQ-9 Total Score MyChart 3 (Minimal depression)   PHQ-9 Total Score 3         8/22/2024   Substance Use   Alcohol more than 3/day or more than 7/wk Not Applicable   Do you use any other substances recreationally? No        Social History     Tobacco Use    Smoking status: Former     Types: Cigarettes, Dip, chew, snus or snuff    Smokeless tobacco: Former     Types: Chew     Quit date: 7/8/2007    Tobacco comments:     smoked cigarettes but not a regular basis a long time ago now, occ chew-4 cans a month   Vaping Use    Vaping status: Former   Substance Use Topics    Alcohol use: Not Currently    Drug use: Not Currently     Types: Marijuana           8/22/2024   STI Screening   New sexual partner(s) since last STI/HIV test? No      ASCVD Risk   The 10-year ASCVD risk score (Trey BENITES, et al., 2019) is: 18.7%    Values used to calculate the score:      Age: 54 years      Sex: Male      Is Non- : Yes      Diabetic: Yes      Tobacco smoker: No      Systolic Blood Pressure: 124 mmHg      Is BP treated: Yes      HDL Cholesterol: 32 mg/dL      Total Cholesterol: 145 mg/dL           Reviewed and updated as needed this visit by Provider                    Patient is fasting today for blood test.  No past medical history on file.  No past surgical history on file.  Patient Active Problem List   Diagnosis    Hyperlipidemia LDL goal <100    Type 2 diabetes mellitus without complication (H)    Essential hypertension    Moderate persistent asthma without complication    Gastroesophageal reflux disease without esophagitis    Alcohol abuse     No past surgical history on file.    Social History     Tobacco Use    Smoking status: Former     Types:  Cigarettes, Dip, chew, snus or snuff    Smokeless tobacco: Former     Types: Chew     Quit date: 2007    Tobacco comments:     smoked cigarettes but not a regular basis a long time ago now, occ chew-4 cans a month   Substance Use Topics    Alcohol use: Not Currently     Family History   Problem Relation Age of Onset    Hypertension Father     Cerebrovascular Disease Father          at 56, CVA in his 40s    Cancer Father     Circulatory Maternal Grandmother         CHF    Hypertension Maternal Grandmother     Alcohol/Drug Maternal Grandfather     Unknown/Adopted Paternal Grandmother     Unknown/Adopted Paternal Grandfather     Hypertension Mother     Diabetes No family hx of          Current Outpatient Medications   Medication Sig Dispense Refill    albuterol (PROAIR HFA/PROVENTIL HFA/VENTOLIN HFA) 108 (90 Base) MCG/ACT inhaler Inhale 2 puffs into the lungs every 4 hours as needed for shortness of breath or wheezing 8.5 g 11    ASPIRIN NOT PRESCRIBED, INTENTIONAL, continuous prn for other. Antiplatelet medication not prescribed intentionally due to Not indicated based on age 1 each 0    budesonide-formoterol (SYMBICORT) 160-4.5 MCG/ACT Inhaler INHALE TWO PUFFS INTO THE LUNGS TWICE A DAY 10 g 11    losartan (COZAAR) 100 MG tablet Take 1 tablet (100 mg) by mouth daily SCHEDULE FASTING LAB APPOINTMENT, THEN AN IN-CLINIC PROVIDER APPOINTMENT. 90 tablet 1    montelukast (SINGULAIR) 10 MG tablet Take 1 tablet (10 mg) by mouth at bedtime 30 tablet 1    NIFEdipine ER OSMOTIC (PROCARDIA XL) 30 MG 24 hr tablet Take 1 tablet (30 mg) by mouth daily SCHEDULE IN-CLINIC FOLLOW UP WITH PROVIDER 90 tablet 3    tamsulosin (FLOMAX) 0.4 MG capsule Take 1 capsule (0.4 mg) by mouth daily 90 capsule 3    atorvastatin (LIPITOR) 20 MG tablet Take 1 tablet (20 mg) by mouth daily SCHEDULE FASTING LAB APPOINTMENT, THEN AN IN-CLINIC PROVIDER APPOINTMENT. (Patient not taking: Reported on 2024) 90 tablet 1    glucose blood VI test  "strips (FREESTYLE LITE) strip Use to test blood sugars 2 times daily or as directed. (Patient not taking: Reported on 7/6/2020) 100 strip 12     No Known Allergies      Review of Systems  Constitutional, HEENT, cardiovascular, pulmonary, GI, , musculoskeletal, neuro, skin, endocrine and psych systems are negative, except as otherwise noted.     Objective    Exam  /84   Pulse 84   Temp 97.7  F (36.5  C) (Tympanic)   Resp 20   Ht 1.93 m (6' 4\")   Wt 121.1 kg (267 lb)   SpO2 95%   BMI 32.50 kg/m     Estimated body mass index is 32.5 kg/m  as calculated from the following:    Height as of this encounter: 1.93 m (6' 4\").    Weight as of this encounter: 121.1 kg (267 lb).    Physical Exam  GENERAL APPEARANCE: obese,  alert and no distress, ambulatory w/o assist  EYES: pink conj, no icterus, PERRL, EOMI  HENT: ear canals and TM's normal, nose and mouth without ulcers or lesions, oropharynx clear and oral mucous membranes moist  NECK: no adenopathy, no asymmetry, masses, or scars and thyroid normal to palpation  RESP: lungs clear to auscultation - no rales, rhonchi or wheezes  CV: regular rates and rhythm, normal S1 S2, no S3 or S4, no murmur, click or rub, no peripheral edema and peripheral pulses strong  ABDOMEN: soft, nontender, no hepatosplenomegaly, no masses and bowel sounds normal  RECTAL: deferred  MS: no musculoskeletal defects are noted and gait is age appropriate without ataxia  SKIN: no suspicious lesions or rashes  NEURO: Normal strength and tone, sensory exam grossly normal, mentation intact and speech normal         Signed Electronically by: Aly Camargo MD    "

## 2024-08-22 NOTE — PATIENT INSTRUCTIONS
Patient Education   Preventive Care Advice   This is general advice given by our system to help you stay healthy. However, your care team may have specific advice just for you. Please talk to your care team about your preventive care needs.  Nutrition  Eat 5 or more servings of fruits and vegetables each day.  Try wheat bread, brown rice and whole grain pasta (instead of white bread, rice, and pasta).  Get enough calcium and vitamin D. Check the label on foods and aim for 100% of the RDA (recommended daily allowance).  Lifestyle  Exercise at least 150 minutes each week  (30 minutes a day, 5 days a week).  Do muscle strengthening activities 2 days a week. These help control your weight and prevent disease.  No smoking.  Wear sunscreen to prevent skin cancer.  Have a dental exam and cleaning every 6 months.  Yearly exams  See your health care team every year to talk about:  Any changes in your health.  Any medicines your care team has prescribed.  Preventive care, family planning, and ways to prevent chronic diseases.  Shots (vaccines)   HPV shots (up to age 26), if you've never had them before.  Hepatitis B shots (up to age 59), if you've never had them before.  COVID-19 shot: Get this shot when it's due.  Flu shot: Get a flu shot every year.  Tetanus shot: Get a tetanus shot every 10 years.  Pneumococcal, hepatitis A, and RSV shots: Ask your care team if you need these based on your risk.  Shingles shot (for age 50 and up)  General health tests  Diabetes screening:  Starting at age 35, Get screened for diabetes at least every 3 years.  If you are younger than age 35, ask your care team if you should be screened for diabetes.  Cholesterol test: At age 39, start having a cholesterol test every 5 years, or more often if advised.  Bone density scan (DEXA): At age 50, ask your care team if you should have this scan for osteoporosis (brittle bones).  Hepatitis C: Get tested at least once in your life.  STIs (sexually  transmitted infections)  Before age 24: Ask your care team if you should be screened for STIs.  After age 24: Get screened for STIs if you're at risk. You are at risk for STIs (including HIV) if:  You are sexually active with more than one person.  You don't use condoms every time.  You or a partner was diagnosed with a sexually transmitted infection.  If you are at risk for HIV, ask about PrEP medicine to prevent HIV.  Get tested for HIV at least once in your life, whether you are at risk for HIV or not.  Cancer screening tests  Cervical cancer screening: If you have a cervix, begin getting regular cervical cancer screening tests starting at age 21.  Breast cancer scan (mammogram): If you've ever had breasts, begin having regular mammograms starting at age 40. This is a scan to check for breast cancer.  Colon cancer screening: It is important to start screening for colon cancer at age 45.  Have a colonoscopy test every 10 years (or more often if you're at risk) Or, ask your provider about stool tests like a FIT test every year or Cologuard test every 3 years.  To learn more about your testing options, visit:   .  For help making a decision, visit:   https://bit.ly/cn76573.  Prostate cancer screening test: If you have a prostate, ask your care team if a prostate cancer screening test (PSA) at age 55 is right for you.  Lung cancer screening: If you are a current or former smoker ages 50 to 80, ask your care team if ongoing lung cancer screenings are right for you.  For informational purposes only. Not to replace the advice of your health care provider. Copyright   2023 [employer]. All rights reserved. Clinically reviewed by the [facility] Transitions Program. [employer] [id number] - REV 01/24.

## 2024-08-27 ENCOUNTER — VIRTUAL VISIT (OUTPATIENT)
Dept: PSYCHOLOGY | Facility: CLINIC | Age: 54
End: 2024-08-27
Payer: COMMERCIAL

## 2024-08-27 DIAGNOSIS — F10.91 ALCOHOL USE DISORDER IN REMISSION: ICD-10-CM

## 2024-08-27 DIAGNOSIS — F33.1 MAJOR DEPRESSIVE DISORDER, RECURRENT, MODERATE (H): Primary | ICD-10-CM

## 2024-08-27 PROCEDURE — 90834 PSYTX W PT 45 MINUTES: CPT | Mod: 95 | Performed by: SOCIAL WORKER

## 2024-08-28 NOTE — PROGRESS NOTES
[facility]                                     Progress Note    Patient Name: Juan Pablo Abraham  Date: 8/27/2024         Service Type: Individual      Session Start Time: 9:02am  Session End Time: 9:40am     Session Length: 38 minutes    Session #: 4    Attendees: Client    Service Modality:  Video Visit:      Provider verified identity through the following two step process.  Patient provided:  Patient is known previously to provider and Patient was verified at admission/transfer    Telemedicine Visit: The patient's condition can be safely assessed and treated via synchronous audio and visual telemedicine encounter.      Reason for Telemedicine Visit: Patient has requested telehealth visit    Originating Site (Patient Location): Patient's home    Distant Site (Provider Location): Provider Remote Setting- Home Office    Consent:  The patient/guardian has verbally consented to: the potential risks and benefits of telemedicine (video visit) versus in person care; bill my insurance or make self-payment for services provided; and responsibility for payment of non-covered services.     Patient would like the video invitation sent by:  Send to e-mail at: [email or web address]    Mode of Communication:  [facility]    Distant Location (Provider):  Off-site    As the provider I attest to compliance with applicable laws and regulations related to telemedicine.    DATA  Interactive Complexity: No  Crisis: No        Progress Since Last Session (Related to Symptoms / Goals / Homework):   Symptoms: Improving Patient reports some improvement to the reported symptoms and presenting concerns since the time of his previous appointment.     Homework: Achieved / completed to satisfaction      Episode of Care Goals: Satisfactory progress - ACTION (Actively working towards change); Intervened by reinforcing change plan / affirming steps taken     Current / Ongoing Stressors and Concerns:   Patient  reports that he was referred for therapy by his  with the [facility] program, following him completing inpatient treatment at [facility] last year, as well as aftercare services. He reports that he has been sober for 1 year now. Patient notes experiencing some guilt in regard to multiple areas of life, as well as some self-critical thinking patterns, both of which impact his mental health. He reports that he was never able to have biological children of his own, which has been a source of stress for him, and has impacted his mental health as well. Patient reports that he has been  to his wife for 24 years, and notes some occasional stress in their relationship, though a good relationship overall at this time. Patient reports some continued improvement to the reported symptoms and presenting concerns since the time of the previous appointment. He reports that he has been doing well overall, recently took some time off from work, and has continued to make some progress on household projects as well as working toward getting his boat out again. He continues to report that as he accomplishes tasks, he feels more motivated and hopeful, which has been a relief. Patient does report some challenges with wanting to improve his physical health, and at times noticing self-critical thought patterns about his appearance, which is frustrating, and is an area he would like to continue to address.      Treatment Objective(s) Addressed in This Session:   1) Patient will identify at least 3-4 worries or thought patterns that contribute to feeling anxious or depressed.  2) Patient will learn at least 3-4 new coping skills for anxiety and depression symptom management, and will practice using these skills at least 1-2 times daily.  3) Patient will be able to identify 5-15 characteristics  or strengths that he likes about himself.  4) Patient will be able to identify two areas of life that he would like to have improved  functioning.  5) Patient will practice the use of self-affirming language, at least 1-2 times daily.     Intervention:   Motivational Interviewing     MI Intervention: Expressed Empathy/Understanding, Supported Autonomy, Collaboration, Evocation, Open-ended questions, Reflections: simple and complex, Change talk (evoked), and Reframe      Change Talk Expressed by the Patient: Desire to change Ability to change Reasons to change Need to change Committment to change Activation Taking steps     Provider Response to Change Talk: E - Evoked more info from patient about behavior change, A - Affirmed patient's thoughts, decisions, or attempts at behavior change, R - Reflected patient's change talk, and S - Summarized patient's change talk statements     Psychodynamic: This writer used open ended questions and reflective listening to assist patient in processing his thoughts and emotions as related to the reported symptoms and presenting concerns.     Assessments completed prior to visit:  The following assessments were completed by patient for this visit:  PHQ9:       4/25/2023     5:04 PM 6/13/2024     9:28 AM 8/22/2024     6:53 AM   PHQ-9 SCORE   PHQ-9 Total Score MyChart  11 (Moderate depression) 3 (Minimal depression)   PHQ-9 Total Score 12 11 3     GAD7:       4/25/2023     5:04 PM 6/13/2024    11:05 AM   KONRAD-7 SCORE   Total Score 5 2         ASSESSMENT: Current Emotional / Mental Status (status of significant symptoms):   Risk status (Self / Other harm or suicidal ideation)   Patient denies current fears or concerns for personal safety.   Patient denies current or recent suicidal ideation or behaviors.   Patient denies current or recent homicidal ideation or behaviors.   Patient denies current or recent self injurious behavior or ideation.   Patient denies other safety concerns.   Patient reports there has been no change in risk factors since their last session.     Patient reports there has been no change in  protective factors since their last session.     Recommended that patient call 911 or go to the local ED should there be a change in any of these risk factors.     Appearance:   Appropriate    Eye Contact:   Good    Psychomotor Behavior: Normal    Attitude:   Cooperative  Interested Attentive   Orientation:   All   Speech    Rate / Production: Normal     Volume:  Normal    Mood:    Anxious  Normal   Affect:    Appropriate    Thought Content:  Clear    Thought Form:  Coherent  Logical    Insight:    Good      Medication Review:   No current psychiatric medications prescribed     Medication Compliance:   NA     Changes in Health Issues:   None reported     Chemical Use Review:   Substance Use: Chemical use reviewed, no active concerns identified      Tobacco Use: No current tobacco use.      Diagnosis:  F33.1 Major Depressive Disorder, recurrent episode, moderate  F10.91 Alcohol Use Disorder, in remission       Collateral Reports Completed:   Not Applicable    PLAN: (Patient Tasks / Therapist Tasks / Other)  Between now and his next appointment, patient will continue to practice prioritizing spending time in at least one activity he enjoys (fishing, golfing, etc), as well as practice the use of affirming language, focusing on what he has been doing well, when he notices self-critical thought patterns coming up.         Deann Duarte, [facility]                                                         ______________________________________________________________________    Individual Treatment Plan     Patient's Name: Juan Pablo Abraham                 YOB: 1970     Date of Creation: 7/3/2024  Date Treatment Plan Last Reviewed/Revised: 7/3/2024 (initial treatment plan)     DSM5 Diagnoses: 296.32 (F33.1) Major Depressive Disorder, Recurrent Episode, Moderate _ or Substance-Related & Addictive Disorders Alcohol Use Disorder   303.90 (F10.20) Moderate In sustained remission  Psychosocial / Contextual Factors:  "Patient reports that he was referred for therapy by his  with the [facility] program, following him completing inpatient treatment at [facility] last year, as well as aftercare services. He reports that he has been sober for 1 year now. Patient notes experiencing some guilt in regard to multiple areas of life, as well as some self-critical thinking patterns, both of which impact his mental health. He reports that he was never able to have biological children of his own, which has been a source of stress for him, and has impacted his mental health as well. Patient reports that he has been  to his wife for 24 years, and notes some occasional stress in their relationship, though a good relationship overall at this time.   PROMIS (reviewed every 90 days): completed on 6/13/2024, with a score of 27     Referral / Collaboration:  Referral to another professional/service is not indicated at this time..     Anticipated number of session for this episode of care:  18-24 sessions  Anticipation frequency of session: Weekly  Anticipated Duration of each session: 38-52 minutes  Treatment plan will be reviewed in 90 days or when goals have been changed.         MeasurableTreatment Goal(s) related to diagnosis / functional impairment(s)  Goal 1: Patient will further develop helpful coping skills to assist him with management of depression and anxiety symptoms, and practice using these skills at least 1-2 times daily, as reported by him, over 3 months' time.   \"I will know I've met my goal when I notice myself having a better outlook on life.\"     Objective #A (Patient Action)                        Patient will identify at least 3-4 worries or thought patterns that contribute to feeling anxious or depressed.  Status: New - Date: 7/3/2024       Intervention(s)  Therapist will ask patient to track his symptoms, to identify any potential patterns with thoughts, emotions, triggers, situations/circumstances.     Objective " "#B  Patient will learn at least 3-4 new coping skills for anxiety and depression symptom management, and will practice using these skills at least 1-2 times daily.  Status: New - Date: 7/3/2024       Intervention(s)  Therapist will teach and practice mindfulness based and cognitive coping skills in session with patient, and will ask him to practice these skills in between appointments as well.      Goal 2: Patient will be able to effectively manage self-critical and other unhelpful thought patterns, as they occur, at least 1-2 times daily, as reported by him, over 3 months' time.  \"I will know I've met my goal when I feel better about myself, and am not so hard on myself.\"      Objective #A (Patient Action)                          Status: New - Date: 7/3/2024      Patient will be able to identify 5-15 characteristics  or strengths that he likes about himself.     Intervention(s)  Therapist will ask patient to identify characteristics or strengths that he or others have noticed throughout his lifetime.      Objective #B  Patient will be able to identify two areas of life that he would like to have improved functioning.               Status: New - Date: 7/3/2024      Intervention(s)  Therapist will assist patient in identifying achievable goals for improved functioning, as well as ways in which he might utilize his strengths and abilities to pursue those goals.      Objective #C  Patient will practice the use of self-affirming language, at least 1-2 times daily.  Status: New - Date: 7/3/2024      Intervention(s)  Therapist will assist patient in identifying language/phrases that focus on his strengths and abilities, and resonate with him as affirming, will practice using this language in session with him, and will ask him to practice using this language outside of session as well.         Patient has reviewed and agreed to the above plan.        Deann Duarte, [facility]                 July 3, 2024      "

## 2024-09-20 ENCOUNTER — ANESTHESIA EVENT (OUTPATIENT)
Dept: GASTROENTEROLOGY | Facility: CLINIC | Age: 54
End: 2024-09-20
Payer: COMMERCIAL

## 2024-09-20 ASSESSMENT — LIFESTYLE VARIABLES: TOBACCO_USE: 1

## 2024-09-20 NOTE — ANESTHESIA PREPROCEDURE EVALUATION
Anesthesia Pre-Procedure Evaluation    Patient: Juan Pablo Abraham   MRN: [id number] : 1970        Procedure : Procedure(s):  Colonoscopy          Past Medical History:   Diagnosis Date    Major depression, recurrent (H) 2024      No past surgical history on file.   No Known Allergies   Social History     Tobacco Use    Smoking status: Former     Types: Cigarettes, Dip, chew, snus or snuff    Smokeless tobacco: Former     Types: Chew     Quit date: 2007    Tobacco comments:     smoked cigarettes but not a regular basis a long time ago now, occ chew-4 cans a month   Substance Use Topics    Alcohol use: Not Currently      Wt Readings from Last 1 Encounters:   24 121.1 kg (267 lb)        Anesthesia Evaluation   Pt has had prior anesthetic.         ROS/MED HX  ENT/Pulmonary:     (+)                tobacco use, Past use,     asthma                  Neurologic:  - neg neurologic ROS     Cardiovascular:     (+) Dyslipidemia hypertension- -   -  - -                                      METS/Exercise Tolerance:     Hematologic:  - neg hematologic  ROS     Musculoskeletal:  - neg musculoskeletal ROS     GI/Hepatic:     (+) GERD,       bowel prep,            Renal/Genitourinary:  - neg Renal ROS     Endo:     (+)  type II DM,                    Psychiatric/Substance Use:     (+) psychiatric history depression       Infectious Disease:  - neg infectious disease ROS     Malignancy:  - neg malignancy ROS     Other:  - neg other ROS          Physical Exam    Airway  airway exam normal      Mallampati: II   TM distance: > 3 FB   Neck ROM: full   Mouth opening: > 3 cm    Respiratory Devices and Support         Dental           Cardiovascular   cardiovascular exam normal       Rhythm and rate: regular and normal     Pulmonary   pulmonary exam normal        breath sounds clear to auscultation           OUTSIDE LABS:  CBC:   Lab Results   Component Value Date    WBC 10.5 2005    HGB 15.7 2005    HCT 45.5  "08/20/2005     08/20/2005     BMP:   Lab Results   Component Value Date     08/22/2024     09/09/2022    POTASSIUM 3.9 08/22/2024    POTASSIUM 3.9 09/09/2022    CHLORIDE 111 (H) 08/22/2024    CHLORIDE 107 09/09/2022    CO2 23 08/22/2024    CO2 20 (L) 09/09/2022    BUN 17.8 08/22/2024    BUN 18.0 09/09/2022    CR 1.03 08/22/2024    CR 0.89 09/09/2022     (H) 08/22/2024     (H) 09/09/2022     COAGS: No results found for: \"PTT\", \"INR\", \"FIBR\"  POC: No results found for: \"BGM\", \"HCG\", \"HCGS\"  HEPATIC:   Lab Results   Component Value Date    ALBUMIN 3.6 09/09/2022    PROTTOTAL 7.7 09/09/2022    ALT 52 (H) 09/09/2022    AST 42 09/09/2022    ALKPHOS 115 09/09/2022    BILITOTAL 0.3 09/09/2022     OTHER:   Lab Results   Component Value Date    A1C 7.0 (H) 08/22/2024    EMMANUEL 9.6 08/22/2024    TSH 1.78 08/14/2018    T4 0.91 08/14/2018       Anesthesia Plan    ASA Status:  2    NPO Status:  NPO Appropriate    Anesthesia Type: General.   Induction: Intravenous, Propofol.   Maintenance: TIVA.        Consents    Anesthesia Plan(s) and associated risks, benefits, and realistic alternatives discussed. Questions answered and patient/representative(s) expressed understanding.     - Discussed: Risks, Benefits and Alternatives for BOTH SEDATION and the PROCEDURE were discussed     - Discussed with:  Patient       - Patient is DNR/DNI Status: No     Use of blood products discussed: No .     Postoperative Care            Comments:               DOE Sanabria CRNA    I have reviewed the pertinent notes and labs in the chart from the past 30 days and (re)examined the patient.  Any updates or changes from those notes are reflected in this note.              # DMII: A1C = 7.0 % (Ref range: 0.0 - 5.6 %) within past 6 months  # Obesity: Estimated body mass index is 32.5 kg/m  as calculated from the following:    Height as of 8/22/24: 1.93 m (6' 4\").    Weight as of 8/22/24: 121.1 kg (267 lb).      "

## 2024-09-23 ENCOUNTER — HOSPITAL ENCOUNTER (OUTPATIENT)
Facility: CLINIC | Age: 54
Discharge: HOME | End: 2024-09-23
Attending: SURGERY | Admitting: SURGERY
Payer: COMMERCIAL

## 2024-09-23 ENCOUNTER — ANESTHESIA (OUTPATIENT)
Dept: GASTROENTEROLOGY | Facility: CLINIC | Age: 54
End: 2024-09-23
Payer: COMMERCIAL

## 2024-09-23 VITALS
BODY MASS INDEX: 31.95 KG/M2 | TEMPERATURE: 97.9 F | HEART RATE: 83 BPM | OXYGEN SATURATION: 97 % | HEIGHT: 75 IN | WEIGHT: 257 LBS | DIASTOLIC BLOOD PRESSURE: 76 MMHG | SYSTOLIC BLOOD PRESSURE: 105 MMHG | RESPIRATION RATE: 16 BRPM

## 2024-09-23 LAB
COLONOSCOPY: NORMAL
GLUCOSE BLDC GLUCOMTR-MCNC: 91 MG/DL (ref 70–99)

## 2024-09-23 PROCEDURE — 250N000009 HC RX 250: Performed by: NURSE ANESTHETIST, CERTIFIED REGISTERED

## 2024-09-23 PROCEDURE — 370N000017 HC ANESTHESIA TECHNICAL FEE, PER MIN: Performed by: SURGERY

## 2024-09-23 PROCEDURE — 45385 COLONOSCOPY W/LESION REMOVAL: CPT | Performed by: SURGERY

## 2024-09-23 PROCEDURE — 82962 GLUCOSE BLOOD TEST: CPT

## 2024-09-23 PROCEDURE — 88305 TISSUE EXAM BY PATHOLOGIST: CPT | Mod: TC | Performed by: SURGERY

## 2024-09-23 PROCEDURE — 258N000003 HC RX IP 258 OP 636: Performed by: SURGERY

## 2024-09-23 PROCEDURE — 250N000009 HC RX 250: Performed by: SURGERY

## 2024-09-23 PROCEDURE — 250N000011 HC RX IP 250 OP 636: Performed by: NURSE ANESTHETIST, CERTIFIED REGISTERED

## 2024-09-23 RX ORDER — LIDOCAINE HYDROCHLORIDE 20 MG/ML
INJECTION, SOLUTION INFILTRATION; PERINEURAL PRN
Status: DISCONTINUED | OUTPATIENT
Start: 2024-09-23 | End: 2024-09-23

## 2024-09-23 RX ORDER — ONDANSETRON 2 MG/ML
4 INJECTION INTRAMUSCULAR; INTRAVENOUS
Status: DISCONTINUED | OUTPATIENT
Start: 2024-09-23 | End: 2024-09-23 | Stop reason: HOSPADM

## 2024-09-23 RX ORDER — DEXAMETHASONE SODIUM PHOSPHATE 4 MG/ML
4 INJECTION, SOLUTION INTRA-ARTICULAR; INTRALESIONAL; INTRAMUSCULAR; INTRAVENOUS; SOFT TISSUE
Status: DISCONTINUED | OUTPATIENT
Start: 2024-09-23 | End: 2024-09-23 | Stop reason: HOSPADM

## 2024-09-23 RX ORDER — PROPOFOL 10 MG/ML
INJECTION, EMULSION INTRAVENOUS CONTINUOUS PRN
Status: DISCONTINUED | OUTPATIENT
Start: 2024-09-23 | End: 2024-09-23

## 2024-09-23 RX ORDER — PROPOFOL 10 MG/ML
INJECTION, EMULSION INTRAVENOUS PRN
Status: DISCONTINUED | OUTPATIENT
Start: 2024-09-23 | End: 2024-09-23

## 2024-09-23 RX ORDER — SODIUM CHLORIDE, SODIUM LACTATE, POTASSIUM CHLORIDE, CALCIUM CHLORIDE 600; 310; 30; 20 MG/100ML; MG/100ML; MG/100ML; MG/100ML
INJECTION, SOLUTION INTRAVENOUS CONTINUOUS
Status: DISCONTINUED | OUTPATIENT
Start: 2024-09-23 | End: 2024-09-23 | Stop reason: HOSPADM

## 2024-09-23 RX ORDER — FLUMAZENIL 0.1 MG/ML
0.2 INJECTION, SOLUTION INTRAVENOUS
Status: CANCELLED | OUTPATIENT
Start: 2024-09-23 | End: 2024-09-23

## 2024-09-23 RX ORDER — LIDOCAINE 40 MG/G
CREAM TOPICAL
Status: DISCONTINUED | OUTPATIENT
Start: 2024-09-23 | End: 2024-09-23 | Stop reason: HOSPADM

## 2024-09-23 RX ORDER — NALOXONE HYDROCHLORIDE 0.4 MG/ML
0.1 INJECTION, SOLUTION INTRAMUSCULAR; INTRAVENOUS; SUBCUTANEOUS
Status: DISCONTINUED | OUTPATIENT
Start: 2024-09-23 | End: 2024-09-23 | Stop reason: HOSPADM

## 2024-09-23 RX ADMIN — SODIUM CHLORIDE, POTASSIUM CHLORIDE, SODIUM LACTATE AND CALCIUM CHLORIDE: 600; 310; 30; 20 INJECTION, SOLUTION INTRAVENOUS at 10:25

## 2024-09-23 RX ADMIN — LIDOCAINE HYDROCHLORIDE 0.1 ML: 10 INJECTION, SOLUTION EPIDURAL; INFILTRATION; INTRACAUDAL; PERINEURAL at 10:26

## 2024-09-23 RX ADMIN — PROPOFOL 50 MG: 10 INJECTION, EMULSION INTRAVENOUS at 11:18

## 2024-09-23 RX ADMIN — PROPOFOL 200 MCG/KG/MIN: 10 INJECTION, EMULSION INTRAVENOUS at 11:18

## 2024-09-23 RX ADMIN — LIDOCAINE HYDROCHLORIDE 100 MG: 20 INJECTION, SOLUTION INFILTRATION; PERINEURAL at 11:18

## 2024-09-23 ASSESSMENT — ACTIVITIES OF DAILY LIVING (ADL)
ADLS_ACUITY_SCORE: 35

## 2024-09-23 NOTE — ANESTHESIA POSTPROCEDURE EVALUATION
Patient: Juan Pablo Abraham    Procedure: Procedure(s):  COLONOSCOPY, FLEXIBLE, WITH LESION REMOVAL USING SNARE       Anesthesia Type:  General    Note:  Disposition: Outpatient   Postop Pain Control: Uneventful            Sign Out: Well controlled pain   PONV: No   Neuro/Psych: Uneventful            Sign Out: Acceptable/Baseline neuro status   Airway/Respiratory: Uneventful            Sign Out: Acceptable/Baseline resp. status   CV/Hemodynamics: Uneventful            Sign Out: Acceptable CV status; No obvious hypovolemia; No obvious fluid overload   Other NRE: NONE   DID A NON-ROUTINE EVENT OCCUR? No           Last vitals:  Vitals Value Taken Time   /74 09/23/24 1145   Temp     Pulse 74 09/23/24 1145   Resp 16 09/23/24 1142   SpO2 97 % 09/23/24 1157   Vitals shown include unfiled device data.    Electronically Signed By: DOE Christianson CRNA  September 23, 2024  12:21 PM

## 2024-09-23 NOTE — H&P
[facility]    Pre-Endoscopy History and Physical     Juan Pablo Abraham MRN# [id number]   YOB: 1970 Age: 54 year old     Date of Procedure: 2024  Primary care provider: Aly Camargo  Type of Endoscopy: Colonoscopy with possible biopsy, possible polypectomy  Reason for Procedure: Screening  Type of Anesthesia Anticipated: Conscious Sedation    HPI:    Juan Pablo is a 54 year old male who will be undergoing the above procedure.      First colonoscopy.  Denies blood in stool or change in stool size.      A history and physical has been performed. The patient's medications and allergies have been reviewed. The risks and benefits of the procedure and the sedation options and risks were discussed with the patient.  All questions were answered and informed consent was obtained.      He denies a personal or family history of anesthesia complications or bleeding disorders.     Patient Active Problem List   Diagnosis    Hyperlipidemia LDL goal <100    Type 2 diabetes mellitus without complication (H)    Essential hypertension    Moderate persistent asthma without complication    Gastroesophageal reflux disease without esophagitis    Alcohol abuse        Past Medical History:   Diagnosis Date    Major depression, recurrent (H) 2024        History reviewed. No pertinent surgical history.    Social History     Tobacco Use    Smoking status: Former     Types: Cigarettes, Dip, chew, snus or snuff    Smokeless tobacco: Former     Types: Chew     Quit date: 2007    Tobacco comments:     smoked cigarettes but not a regular basis a long time ago now, occ chew-4 cans a month   Substance Use Topics    Alcohol use: Not Currently       Family History   Problem Relation Age of Onset    Hypertension Father     Cerebrovascular Disease Father          at 56, CVA in his 40s    Cancer Father     Circulatory Maternal Grandmother         CHF    Hypertension Maternal Grandmother      "Alcohol/Drug Maternal Grandfather     Unknown/Adopted Paternal Grandmother     Unknown/Adopted Paternal Grandfather     Hypertension Mother     Diabetes No family hx of        Prior to Admission medications    Medication Sig Start Date End Date Taking? Authorizing Provider   albuterol (PROAIR HFA/PROVENTIL HFA/VENTOLIN HFA) 108 (90 Base) MCG/ACT inhaler Inhale 2 puffs into the lungs every 4 hours as needed for shortness of breath or wheezing. 8/22/24  Yes Aly Camargo MD   atorvastatin (LIPITOR) 20 MG tablet Take 1 tablet (20 mg) by mouth daily. 8/22/24  Yes Aly Camargo MD   budesonide-formoterol (SYMBICORT) 160-4.5 MCG/ACT Inhaler INHALE TWO PUFFS INTO THE LUNGS TWICE A DAY 8/22/24  Yes Aly Camargo MD   losartan (COZAAR) 100 MG tablet Take 1 tablet (100 mg) by mouth daily. SCHEDULE FASTING LAB APPOINTMENT, THEN AN IN-CLINIC PROVIDER APPOINTMENT. 8/22/24  Yes Aly Camargo MD   montelukast (SINGULAIR) 10 MG tablet Take 1 tablet (10 mg) by mouth at bedtime. 8/22/24  Yes Aly Camargo MD   NIFEdipine ER OSMOTIC (PROCARDIA XL) 30 MG 24 hr tablet Take 1 tablet (30 mg) by mouth daily SCHEDULE IN-CLINIC FOLLOW UP WITH PROVIDER 6/3/24  Yes Aly Camargo MD   tamsulosin (FLOMAX) 0.4 MG capsule Take 1 capsule (0.4 mg) by mouth daily 7/22/24  Yes Aly Camargo MD       No Known Allergies     REVIEW OF SYSTEMS:   5 point ROS negative except as noted above in HPI, including Gen., Resp., CV, GI &  system review.    PHYSICAL EXAM:   BP (!) 130/95   Temp 97.9  F (36.6  C) (Oral)   Resp 19   Ht 1.905 m (6' 3\")   Wt 116.6 kg (257 lb)   SpO2 96%   BMI 32.12 kg/m   Estimated body mass index is 32.12 kg/m  as calculated from the following:    Height as of this encounter: 1.905 m (6' 3\").    Weight as of this encounter: 116.6 kg (257 lb).   Constitutional: Awake, alert, no acute distress.  Eyes: No scleral icterus.  Conjunctiva are " without injection.  ENMT: Mucous membranes moist, dentition and gums are intact.   Neck: Soft, supple, trachea midline.    Endocrine: n/a   Lymphatic: There is no cervical, submandibularadenopathy.  Respiratory: normal effortgs   Cardiovascular: S1, S2  Abdomen: Non-distended, non-tender,  No masses,  Musculoskeletal: No spinal or CVA tenderness. Full range of motion in the upper and lower extremities.    Skin: No skin rashes or lesions to inspection.  No petechia.    Neurologic: alerted and oriented 3x  Psychiatric: The patient's affect is not blunted and mood is appropriate.  DIAGNOSTICS:    Not indicated    IMPRESSION   ASA Class 2 - Mild systemic disease    PLAN:   Plan for Colonoscopy with possible biopsy, possible polypectomy. We discussed the risks, benefits and alternatives and the patient wished to proceed.  Patient is cleared for the above procedure.    The above has been forwarded to the consulting provider.    Silvino Eng DO  [facility]

## 2024-09-23 NOTE — LETTER
Juan Pablo Abraham  [address]  [place] [address]      September 30, 2024    Dear Juan Pablo,  This letter is written to inform you of the results of your recent colonoscopy.  Your examination showed polyp(s) in your transverse colon. All polyps were removed in their entirety and sent for review by a pathologist. As you will see on the pathology report below, the tissue(s) were polyps consistent with sessile serrated adenoma. Your examination also showed diverticulosis    Diverticulosis can be described as small outpouchings (pockets) in your colon wall. This is an entirely benign (non-cancerous) finding.  Adenomatous polyps are entirely benign (non-cancerous); however, patients who have developed these polyps are at an increased risk for developing additional polyps in the future. If these are not eventually removed, there is a risk of developing colon cancer. In particular, sessile serrated adenomas are associated with increased risk of malignant transformation. We will advise more frequent examinations with you because of the risk associated with this type of polyp.    Final Diagnosis   Large intestine, transverse, polypectomy:  -Sessile serrated lesion without cytologic dysplasia,          Given these findings,  I recommend that you undergo a repeat colonoscopy in 3 year(s) for surveillance. We will enter you into a recall system so you receive a reminder closer to the time that you are due for repeat examination.     Please remember that this recommendation is made with the understanding that you are not experiencing persistent changes in bowel function, bleeding per rectum, and/or significant abdominal pain. If you experience these symptoms, please contact your primary care provider for a further evaluation.     If you have any questions or concerns about the results of your colonoscopy or the appropriate follow-up, please contact my assistant at [phone]    Sincerely,      Silvino Eng  DO   [facility]  ___

## 2024-09-23 NOTE — ANESTHESIA CARE TRANSFER NOTE
Patient: Juan Pablo Abraham    Procedure: Procedure(s):  COLONOSCOPY, FLEXIBLE, WITH LESION REMOVAL USING SNARE       Diagnosis: Screen for colon cancer [Z12.11]  Diagnosis Additional Information: No value filed.    Anesthesia Type:   General     Note:    Oropharynx: oropharynx clear of all foreign objects  Level of Consciousness: awake  Oxygen Supplementation: room air    Independent Airway: airway patency satisfactory and stable  Dentition: dentition unchanged  Vital Signs Stable: post-procedure vital signs reviewed and stable  Report to RN Given: handoff report given  Patient transferred to: Phase II    Handoff Report: Identifed the Patient, Identified the Reponsible Provider, Reviewed the pertinent medical history, Discussed the surgical course, Reviewed Intra-OP anesthesia mangement and issues during anesthesia, Set expectations for post-procedure period and Allowed opportunity for questions and acknowledgement of understanding      Vitals:  Vitals Value Taken Time   /76 09/23/24 1141   Temp     Pulse 83 09/23/24 1141   Resp     SpO2 96 % 09/23/24 1144   Vitals shown include unfiled device data.    Electronically Signed By: DOE Christianson CRNA  September 23, 2024  11:45 AM

## 2024-09-26 LAB
PATH REPORT.COMMENTS IMP SPEC: NORMAL
PATH REPORT.COMMENTS IMP SPEC: NORMAL
PATH REPORT.FINAL DX SPEC: NORMAL
PATH REPORT.GROSS SPEC: NORMAL
PATH REPORT.MICROSCOPIC SPEC OTHER STN: NORMAL
PATH REPORT.RELEVANT HX SPEC: NORMAL
PHOTO IMAGE: NORMAL

## 2024-09-26 PROCEDURE — 88305 TISSUE EXAM BY PATHOLOGIST: CPT | Mod: 26 | Performed by: PATHOLOGY

## 2025-01-16 ENCOUNTER — VIRTUAL VISIT (OUTPATIENT)
Dept: PSYCHOLOGY | Facility: CLINIC | Age: 55
End: 2025-01-16
Payer: COMMERCIAL

## 2025-01-16 DIAGNOSIS — F10.91 ALCOHOL USE DISORDER IN REMISSION: ICD-10-CM

## 2025-01-16 DIAGNOSIS — F33.1 MAJOR DEPRESSIVE DISORDER, RECURRENT, MODERATE (H): Primary | ICD-10-CM

## 2025-01-16 PROCEDURE — 90834 PSYTX W PT 45 MINUTES: CPT | Mod: 95 | Performed by: SOCIAL WORKER

## 2025-01-16 ASSESSMENT — ANXIETY QUESTIONNAIRES
6. BECOMING EASILY ANNOYED OR IRRITABLE: SEVERAL DAYS
2. NOT BEING ABLE TO STOP OR CONTROL WORRYING: NOT AT ALL
GAD7 TOTAL SCORE: 3
3. WORRYING TOO MUCH ABOUT DIFFERENT THINGS: SEVERAL DAYS
IF YOU CHECKED OFF ANY PROBLEMS ON THIS QUESTIONNAIRE, HOW DIFFICULT HAVE THESE PROBLEMS MADE IT FOR YOU TO DO YOUR WORK, TAKE CARE OF THINGS AT HOME, OR GET ALONG WITH OTHER PEOPLE: NOT DIFFICULT AT ALL
5. BEING SO RESTLESS THAT IT IS HARD TO SIT STILL: NOT AT ALL
7. FEELING AFRAID AS IF SOMETHING AWFUL MIGHT HAPPEN: NOT AT ALL
1. FEELING NERVOUS, ANXIOUS, OR ON EDGE: SEVERAL DAYS
GAD7 TOTAL SCORE: 3

## 2025-01-16 ASSESSMENT — PATIENT HEALTH QUESTIONNAIRE - PHQ9
SUM OF ALL RESPONSES TO PHQ QUESTIONS 1-9: 1
5. POOR APPETITE OR OVEREATING: NOT AT ALL

## 2025-01-16 NOTE — PROGRESS NOTES
[facility]                                     Progress Note    Patient Name: Juan Pablo Abraham  Date: 1/16/2025         Service Type: Individual      Session Start Time: 9:02am  Session End Time: 9:40am     Session Length: 38 minutes    Session #: 5    Attendees: Client    Service Modality:  Video Visit:      Provider verified identity through the following two step process.  Patient provided:  Patient is known previously to provider and Patient was verified at admission/transfer    Telemedicine Visit: The patient's condition can be safely assessed and treated via synchronous audio and visual telemedicine encounter.      Reason for Telemedicine Visit: Patient has requested telehealth visit    Originating Site (Patient Location): Patient's home    Distant Site (Provider Location): Provider Remote Setting- Home Office    Consent:  The patient/guardian has verbally consented to: the potential risks and benefits of telemedicine (video visit) versus in person care; bill my insurance or make self-payment for services provided; and responsibility for payment of non-covered services.     Patient would like the video invitation sent by:  Send to e-mail at: [email or web address]    Mode of Communication:  Video Conference via [facility]    Distant Location (Provider):  Off-site    As the provider I attest to compliance with applicable laws and regulations related to telemedicine.    DATA  Interactive Complexity: No  Crisis: No        Progress Since Last Session (Related to Symptoms / Goals / Homework):   Symptoms: Improving Patient reports improvement to the reported symptoms and presenting concerns since the time of his previous appointment.     Homework: Achieved / completed to satisfaction      Episode of Care Goals: Satisfactory progress - ACTION (Actively working towards change); Intervened by reinforcing change plan / affirming steps taken     Current / Ongoing Stressors and Concerns:   Patient reports  "that he was referred for therapy by his  with the [facility] program, following him completing inpatient treatment at [facility] last year, as well as aftercare services. He reports that he has been sober for 1 year now. Patient notes experiencing some guilt in regard to multiple areas of life, as well as some self-critical thinking patterns, both of which impact his mental health. He reports that he was never able to have biological children of his own, which has been a source of stress for him, and has impacted his mental health as well. Patient reports that he has been  to his wife for 24 years, and notes some occasional stress in their relationship, though a good relationship overall at this time. Patient reports continued improvement to the reported symptoms and presenting concerns since the time of the previous appointment in September 2024. He reports that he has has continued to maintain his sobriety, states that he continues to enjoy his job and that work continues to go well, and that his overall level of motivation and mood have improved. Patient reports noticing that he is also \"not being as hard on myself\" with a decrease in negative self-talk recently. Patient notes improvement in his relationship with his wife, which has been a relief as well. He reports that a good friend of his has recently been struggling with some serious medical concerns, and that this has been difficult for him to process at times.      Treatment Objective(s) Addressed in This Session:   1) Patient will identify at least 3-4 worries or thought patterns that contribute to feeling anxious or depressed.  2) Patient will learn at least 3-4 new coping skills for anxiety and depression symptom management, and will practice using these skills at least 1-2 times daily.  3) Patient will be able to identify 5-15 characteristics  or strengths that he likes about himself.  4) Patient will be able to identify two areas of life " that he would like to have improved functioning.  5) Patient will practice the use of self-affirming language, at least 1-2 times daily.        Intervention:   Motivational Interviewing     MI Intervention: Expressed Empathy/Understanding, Supported Autonomy, Collaboration, Evocation, Open-ended questions, Reflections: simple and complex, Change talk (evoked), and Reframe      Change Talk Expressed by the Patient: Desire to change Ability to change Reasons to change Need to change Committment to change Activation Taking steps     Provider Response to Change Talk: E - Evoked more info from patient about behavior change, A - Affirmed patient's thoughts, decisions, or attempts at behavior change, R - Reflected patient's change talk, and S - Summarized patient's change talk statements     Psychodynamic: This writer used open ended questions and reflective listening to assist patient in processing his thoughts and emotions as related to the reported symptoms and presenting concerns.     Assessments completed prior to visit:  The following assessments were completed by patient for this visit:  PHQ9:       4/25/2023     5:04 PM 6/13/2024     9:28 AM 8/22/2024     6:53 AM 1/16/2025     9:29 AM   PHQ-9 SCORE   PHQ-9 Total Score MyChart  11 (Moderate depression) 3 (Minimal depression)    PHQ-9 Total Score 12 11 3 1     GAD7:       4/25/2023     5:04 PM 6/13/2024    11:05 AM 1/16/2025     9:29 AM   KONRAD-7 SCORE   Total Score 5 2 3         ASSESSMENT: Current Emotional / Mental Status (status of significant symptoms):   Risk status (Self / Other harm or suicidal ideation)   Patient denies current fears or concerns for personal safety.   Patient denies current or recent suicidal ideation or behaviors.   Patient denies current or recent homicidal ideation or behaviors.   Patient denies current or recent self injurious behavior or ideation.   Patient denies other safety concerns.   Patient reports there has been no change in risk  factors since their last session.     Patient reports there has been no change in protective factors since their last session.     Recommended that patient call 911 or go to the local ED should there be a change in any of these risk factors     Appearance:   Appropriate    Eye Contact:   Good    Psychomotor Behavior: Normal    Attitude:   Cooperative  Interested Attentive   Orientation:   All   Speech    Rate / Production: Normal     Volume:  Normal    Mood:    Normal   Affect:    Appropriate    Thought Content:  Clear    Thought Form:  Coherent  Logical    Insight:    Good      Medication Review:   No current psychiatric medications prescribed     Medication Compliance:   NA     Changes in Health Issues:   None reported     Chemical Use Review:   Substance Use: Chemical use reviewed, no active concerns identified      Tobacco Use: No current tobacco use.      Diagnosis:  F33.1 Major Depressive Disorder, recurrent episode, moderate  F10.91 Alcohol Use Disorder, in remission    Collateral Reports Completed:   Not Applicable    PLAN: (Patient Tasks / Therapist Tasks / Other)  Between now and his next appointment, patient will continue to practice prioritizing spending time in at least one activity he enjoys (fishing, golfing, etc), as well as practice the use of affirming language, focusing on what he has been doing well, when he notices self-critical thought patterns coming up.         Deann Duarte, [facility]                                                         ______________________________________________________________________    Individual Treatment Plan     Patient's Name: Juan Pablo Abraham                 YOB: 1970     Date of Creation: 7/3/2024  Date Treatment Plan Last Reviewed/Revised: 1/16/2025     DSM5 Diagnoses: 296.32 (F33.1) Major Depressive Disorder, Recurrent Episode, Moderate _ or Substance-Related & Addictive Disorders Alcohol Use Disorder   303.90 (F10.20) Moderate In sustained  "remission  Psychosocial / Contextual Factors: Patient reports that he was referred for therapy by his  with the [facility] program, following him completing inpatient treatment at [facility] last year, as well as aftercare services. He reports that he has been sober for 1 year now. Patient notes experiencing some guilt in regard to multiple areas of life, as well as some self-critical thinking patterns, both of which impact his mental health. He reports that he was never able to have biological children of his own, which has been a source of stress for him, and has impacted his mental health as well. Patient reports that he has been  to his wife for 24 years, and notes some occasional stress in their relationship, though a good relationship overall at this time.   PROMIS (reviewed every 90 days): completed on 1/16/2025, with a score of 32     Referral / Collaboration:  Referral to another professional/service is not indicated at this time..     Anticipated number of session for this episode of care:  18-24 sessions  Anticipation frequency of session: Weekly  Anticipated Duration of each session: 38-52 minutes  Treatment plan will be reviewed in 90 days or when goals have been changed.         MeasurableTreatment Goal(s) related to diagnosis / functional impairment(s)  Goal 1: Patient will further develop helpful coping skills to assist him with management of depression and anxiety symptoms, and practice using these skills at least 1-2 times daily, as reported by him, over 3 months' time.   \"I will know I've met my goal when I notice myself having a better outlook on life.\"     Objective #A (Patient Action)                        Patient will identify at least 3-4 worries or thought patterns that contribute to feeling anxious or depressed.  Status: Continued - Date: 1/16/2025     Intervention(s)  Therapist will ask patient to track his symptoms, to identify any potential patterns with thoughts, emotions, " "triggers, situations/circumstances.     Objective #B  Patient will learn at least 3-4 new coping skills for anxiety and depression symptom management, and will practice using these skills at least 1-2 times daily.  Status: Continued - Date: 1/16/2025     Intervention(s)  Therapist will teach and practice mindfulness based and cognitive coping skills in session with patient, and will ask him to practice these skills in between appointments as well.      Goal 2: Patient will be able to effectively manage self-critical and other unhelpful thought patterns, as they occur, at least 1-2 times daily, as reported by him, over 3 months' time.  \"I will know I've met my goal when I feel better about myself, and am not so hard on myself.\"      Objective #A (Patient Action)                          Status: Continued - Date: 1/16/2025     Patient will be able to identify 5-15 characteristics  or strengths that he likes about himself.     Intervention(s)  Therapist will ask patient to identify characteristics or strengths that he or others have noticed throughout his lifetime.      Objective #B  Patient will be able to identify two areas of life that he would like to have improved functioning.               Status: Continued - Date: 1/16/2025   Intervention(s)  Therapist will assist patient in identifying achievable goals for improved functioning, as well as ways in which he might utilize his strengths and abilities to pursue those goals.      Objective #C  Patient will practice the use of self-affirming language, at least 1-2 times daily.  Status: Continued - Date: 1/16/2025      Intervention(s)  Therapist will assist patient in identifying language/phrases that focus on his strengths and abilities, and resonate with him as affirming, will practice using this language in session with him, and will ask him to practice using this language outside of session as well.         Patient has reviewed and agreed to the above plan.      "   Deann Duarte, [facility]                 January 16, 2025

## 2025-03-02 ENCOUNTER — HEALTH MAINTENANCE LETTER (OUTPATIENT)
Age: 55
End: 2025-03-02

## 2025-03-11 ENCOUNTER — VIRTUAL VISIT (OUTPATIENT)
Dept: PSYCHOLOGY | Facility: CLINIC | Age: 55
End: 2025-03-11
Payer: COMMERCIAL

## 2025-03-11 DIAGNOSIS — F33.1 MAJOR DEPRESSIVE DISORDER, RECURRENT, MODERATE (H): Primary | ICD-10-CM

## 2025-03-11 DIAGNOSIS — F10.91 ALCOHOL USE DISORDER IN REMISSION: ICD-10-CM

## 2025-03-11 PROCEDURE — 90834 PSYTX W PT 45 MINUTES: CPT | Mod: 95 | Performed by: SOCIAL WORKER

## 2025-03-11 NOTE — PROGRESS NOTES
[facility]                                     Progress Note    Patient Name: Juan Pablo Abraham  Date: 3/11/2025         Service Type: Individual      Session Start Time: 8:01am  Session End Time: 8:40am     Session Length: 39 minutes    Session #: 6    Attendees: Client    Service Modality:  Video Visit:      Provider verified identity through the following two step process.  Patient provided:  Patient is known previously to provider and Patient was verified at admission/transfer    Telemedicine Visit: The patient's condition can be safely assessed and treated via synchronous audio and visual telemedicine encounter.      Reason for Telemedicine Visit: Patient has requested telehealth visit    Originating Site (Patient Location): Patient's home    Distant Site (Provider Location): Provider Remote Setting- Home Office    Consent:  The patient/guardian has verbally consented to: the potential risks and benefits of telemedicine (video visit) versus in person care; bill my insurance or make self-payment for services provided; and responsibility for payment of non-covered services.     Patient would like the video invitation sent by:  Send to e-mail at: [email or web address]    Mode of Communication:  [facility]    Distant Location (Provider):  Off-site    As the provider I attest to compliance with applicable laws and regulations related to telemedicine.    DATA  Interactive Complexity: No  Crisis: No        Progress Since Last Session (Related to Symptoms / Goals / Homework):   Symptoms: Improving Patient reports overall improvement to the reported symptoms and presenting concerns since the time of his previous appointment.     Homework: Achieved / completed to satisfaction      Episode of Care Goals: Satisfactory progress - ACTION (Actively working towards change); Intervened by reinforcing change plan / affirming steps taken     Current / Ongoing Stressors and Concerns:     Patient  reports that he was referred for therapy by his  with the [facility] program, following him completing inpatient treatment at [facility] last year, as well as aftercare services. He reports that he has been sober for almost 2 years now. Patient notes experiencing some guilt in regard to multiple areas of life, as well as some self-critical thinking patterns, both of which impact his mental health. He reports that he was never able to have biological children of his own, which has been a source of stress for him, and has impacted his mental health as well. Patient reports that he has been  to his wife for 24 years, and notes some occasional stress in their relationship, though a good relationship overall at this time. Patient reports continued improvement to the reported symptoms and presenting concerns since the time of the previous appointment. He reports that he has has continued to maintain his sobriety, states that he continues to enjoy his job and that work continues to go well, and that his overall level of motivation and mood have improved. He recently found out that he will graduate from the [facility] program soon, which he is proud of, but is also experiencing some anxiety as well, as he has gotten used to the routine/structure of the program's guidelines. Patient acknowledges that he knows he has good formal and informal support networks in place, that he has a strong sense of self-awareness and that he is a much more accountable person than he had been previously, all of which will assist him in maintaining his sobriety in the long-term. Patient also reports some recent anxiety in regard to current events in the news, and how some of what is currently happening in the nation and the world could potentially impact his life, as well as the lives of people he cares about. Patient reports coping by trying to focus on what is within and outside of his control, and the parts of life that bring him  happiness.      Treatment Objective(s) Addressed in This Session:   1) Patient will identify at least 3-4 worries or thought patterns that contribute to feeling anxious or depressed.  2) Patient will learn at least 3-4 new coping skills for anxiety and depression symptom management, and will practice using these skills at least 1-2 times daily.  3) Patient will be able to identify 5-15 characteristics  or strengths that he likes about himself.  4) Patient will be able to identify two areas of life that he would like to have improved functioning.  5) Patient will practice the use of self-affirming language, at least 1-2 times daily.      Intervention:    Motivational Interviewing     MI Intervention: Expressed Empathy/Understanding, Supported Autonomy, Collaboration, Evocation, Open-ended questions, Reflections: simple and complex, Change talk (evoked), and Reframe      Change Talk Expressed by the Patient: Desire to change Ability to change Reasons to change Need to change Committment to change Activation Taking steps     Provider Response to Change Talk: E - Evoked more info from patient about behavior change, A - Affirmed patient's thoughts, decisions, or attempts at behavior change, R - Reflected patient's change talk, and S - Summarized patient's change talk statements     Psychodynamic: This writer used open ended questions and reflective listening to assist patient in processing his thoughts and emotions as related to the reported symptoms and presenting concerns.        Assessments completed prior to visit:  The following assessments were completed by patient for this visit:  PHQ9:       4/25/2023     5:04 PM 6/13/2024     9:28 AM 8/22/2024     6:53 AM 1/16/2025     9:29 AM   PHQ-9 SCORE   PHQ-9 Total Score MyChart  11 (Moderate depression) 3 (Minimal depression)    PHQ-9 Total Score 12 11 3 1     GAD7:       4/25/2023     5:04 PM 6/13/2024    11:05 AM 1/16/2025     9:29 AM   KONRAD-7 SCORE   Total Score 5 2 3          ASSESSMENT: Current Emotional / Mental Status (status of significant symptoms):   Risk status (Self / Other harm or suicidal ideation)   Patient denies current fears or concerns for personal safety.   Patient denies current or recent suicidal ideation or behaviors.   Patient denies current or recent homicidal ideation or behaviors.   Patient denies current or recent self injurious behavior or ideation.   Patient denies other safety concerns.   Patient reports there has been no change in risk factors since their last session.     Patient reports there has been no change in protective factors since their last session.     Recommended that patient call 911 or go to the local ED should there be a change in any of these risk factors     Appearance:   Appropriate    Eye Contact:   Good    Psychomotor Behavior: Normal    Attitude:   Cooperative  Interested Attentive   Orientation:   All   Speech    Rate / Production: Normal     Volume:  Normal    Mood:    Normal   Affect:    Appropriate    Thought Content:  Clear    Thought Form:  Coherent  Logical    Insight:    Good      Medication Review:   No current psychiatric medications prescribed     Medication Compliance:   NA     Changes in Health Issues:   None reported     Chemical Use Review:   Substance Use: Chemical use reviewed, no active concerns identified      Tobacco Use: No current tobacco use.      Diagnosis:  F33.1 Major Depressive Disorder, recurrent episode, moderate  F10.91 Alcohol Use Disorder, in remission       Collateral Reports Completed:   Not Applicable    PLAN: (Patient Tasks / Therapist Tasks / Other)  Between now and his next appointment, patient will continue to practice prioritizing spending time in at least one activity he enjoys (fishing, golfing, etc), as well as practice the use of affirming language, focusing on what he has been doing well, when he notices self-critical thought patterns coming up.            SON MerchantSW                                                          ______________________________________________________________________    Individual Treatment Plan     Patient's Name: Juan Pablo Abraham                 YOB: 1970     Date of Creation: 7/3/2024  Date Treatment Plan Last Reviewed/Revised: 1/16/2025     DSM5 Diagnoses: 296.32 (F33.1) Major Depressive Disorder, Recurrent Episode, Moderate _ or Substance-Related & Addictive Disorders Alcohol Use Disorder   303.90 (F10.20) Moderate In sustained remission  Psychosocial / Contextual Factors: Patient reports that he was referred for therapy by his  with the [facility] program, following him completing inpatient treatment at [facility] last year, as well as aftercare services. He reports that he has been sober for 1 year now. Patient notes experiencing some guilt in regard to multiple areas of life, as well as some self-critical thinking patterns, both of which impact his mental health. He reports that he was never able to have biological children of his own, which has been a source of stress for him, and has impacted his mental health as well. Patient reports that he has been  to his wife for 24 years, and notes some occasional stress in their relationship, though a good relationship overall at this time.   PROMIS (reviewed every 90 days): completed on 1/16/2025, with a score of 32     Referral / Collaboration:  Referral to another professional/service is not indicated at this time..     Anticipated number of session for this episode of care:  18-24 sessions  Anticipation frequency of session: Weekly  Anticipated Duration of each session: 38-52 minutes  Treatment plan will be reviewed in 90 days or when goals have been changed.         MeasurableTreatment Goal(s) related to diagnosis / functional impairment(s)  Goal 1: Patient will further develop helpful coping skills to assist him with management of depression and anxiety symptoms, and practice  "using these skills at least 1-2 times daily, as reported by him, over 3 months' time.   \"I will know I've met my goal when I notice myself having a better outlook on life.\"     Objective #A (Patient Action)                        Patient will identify at least 3-4 worries or thought patterns that contribute to feeling anxious or depressed.  Status: Continued - Date: 1/16/2025     Intervention(s)  Therapist will ask patient to track his symptoms, to identify any potential patterns with thoughts, emotions, triggers, situations/circumstances.     Objective #B  Patient will learn at least 3-4 new coping skills for anxiety and depression symptom management, and will practice using these skills at least 1-2 times daily.  Status: Continued - Date: 1/16/2025     Intervention(s)  Therapist will teach and practice mindfulness based and cognitive coping skills in session with patient, and will ask him to practice these skills in between appointments as well.      Goal 2: Patient will be able to effectively manage self-critical and other unhelpful thought patterns, as they occur, at least 1-2 times daily, as reported by him, over 3 months' time.  \"I will know I've met my goal when I feel better about myself, and am not so hard on myself.\"      Objective #A (Patient Action)                          Status: Continued - Date: 1/16/2025     Patient will be able to identify 5-15 characteristics  or strengths that he likes about himself.     Intervention(s)  Therapist will ask patient to identify characteristics or strengths that he or others have noticed throughout his lifetime.      Objective #B  Patient will be able to identify two areas of life that he would like to have improved functioning.               Status: Continued - Date: 1/16/2025   Intervention(s)  Therapist will assist patient in identifying achievable goals for improved functioning, as well as ways in which he might utilize his strengths and abilities to pursue " those goals.      Objective #C  Patient will practice the use of self-affirming language, at least 1-2 times daily.  Status: Continued - Date: 1/16/2025      Intervention(s)  Therapist will assist patient in identifying language/phrases that focus on his strengths and abilities, and resonate with him as affirming, will practice using this language in session with him, and will ask him to practice using this language outside of session as well.         Patient has reviewed and agreed to the above plan.        Deann Duarte, [facility]                 January 16, 2025

## 2025-04-01 ENCOUNTER — VIRTUAL VISIT (OUTPATIENT)
Dept: PSYCHOLOGY | Facility: CLINIC | Age: 55
End: 2025-04-01
Payer: COMMERCIAL

## 2025-04-01 DIAGNOSIS — F10.91 ALCOHOL USE DISORDER IN REMISSION: ICD-10-CM

## 2025-04-01 DIAGNOSIS — F33.1 MAJOR DEPRESSIVE DISORDER, RECURRENT, MODERATE (H): Primary | ICD-10-CM

## 2025-04-01 PROCEDURE — 90832 PSYTX W PT 30 MINUTES: CPT | Mod: 95 | Performed by: SOCIAL WORKER

## 2025-04-01 NOTE — PROGRESS NOTES
[facility]                                     Progress Note    Patient Name: Juan Pablo Abraham  Date: 4/1/2025         Service Type: Individual      Session Start Time: 8:00am  Session End Time: 8:30am     Session Length: 30 minutes    Session #: 7    Attendees: Client    Service Modality:  Video Visit:      Provider verified identity through the following two step process.  Patient provided:  Patient is known previously to provider and Patient was verified at admission/transfer    Telemedicine Visit: The patient's condition can be safely assessed and treated via synchronous audio and visual telemedicine encounter.      Reason for Telemedicine Visit: Patient has requested telehealth visit    Originating Site (Patient Location): Patient's home    Distant Site (Provider Location): Provider Remote Setting- Home Office    Consent:  The patient/guardian has verbally consented to: the potential risks and benefits of telemedicine (video visit) versus in person care; bill my insurance or make self-payment for services provided; and responsibility for payment of non-covered services.     Patient would like the video invitation sent by:  Send to e-mail at: [email or web address]    Mode of Communication:  [facility]    Distant Location (Provider):  Off-site    As the provider I attest to compliance with applicable laws and regulations related to telemedicine.    DATA  Interactive Complexity: No  Crisis: No        Progress Since Last Session (Related to Symptoms / Goals / Homework):   Symptoms: Improving Patient reports continued improvement to the reported symptoms and presenting concerns since the time of his previous appointment.     Homework: Achieved / completed to satisfaction      Episode of Care Goals: Satisfactory progress - ACTION (Actively working towards change); Intervened by reinforcing change plan / affirming steps taken     Current / Ongoing Stressors and Concerns:   Patient  reports that he was referred for therapy by his  with the [facility] program, following him completing inpatient treatment at [facility] last year, as well as aftercare services. He reports that he has been sober for almost 2 years now. Patient notes a history of experiencing some guilt in regard to multiple areas of life, as well as some self-critical thinking patterns, both of which impact his mental health. He reports that he was never able to have biological children of his own, which has been a source of stress for him, and has impacted his mental health as well. Patient reports that he has been  to his wife for 24 years, and notes some occasional stress in their relationship, though a good relationship overall at this time. Patient reports continued improvement to the reported symptoms and presenting concerns since the time of the previous appointment. He reports that he has has continued to maintain his sobriety, states that he continues to enjoy his job and that work continues to go well, and that he has been feeling more confident overall in his ability to maintain good progress in the long term with the changes he has made over the past couple of years. Patient notes recognizing that he has a lot of motivation to keep moving forward in a positive direction, and states that his life has become much better now that he is sober. Patient reports a recent stressful situation at work, and feeling proud of how he managed this situation.      Treatment Objective(s) Addressed in This Session:   1) Patient will identify at least 3-4 worries or thought patterns that contribute to feeling anxious or depressed.  2) Patient will learn at least 3-4 new coping skills for anxiety and depression symptom management, and will practice using these skills at least 1-2 times daily.  3) Patient will be able to identify 5-15 characteristics  or strengths that he likes about himself.  4) Patient will be able to identify  two areas of life that he would like to have improved functioning.  5) Patient will practice the use of self-affirming language, at least 1-2 times daily.     Intervention:   Motivational Interviewing     MI Intervention: Expressed Empathy/Understanding, Supported Autonomy, Collaboration, Evocation, Open-ended questions, Reflections: simple and complex, Change talk (evoked), and Reframe      Change Talk Expressed by the Patient: Desire to change Ability to change Reasons to change Need to change Committment to change Activation Taking steps     Provider Response to Change Talk: E - Evoked more info from patient about behavior change, A - Affirmed patient's thoughts, decisions, or attempts at behavior change, R - Reflected patient's change talk, and S - Summarized patient's change talk statements     Psychodynamic: This writer used open ended questions and reflective listening to assist patient in processing his thoughts and emotions as related to the reported symptoms and presenting concerns.        Assessments completed prior to visit:  The following assessments were completed by patient for this visit:  PHQ9:       4/25/2023     5:04 PM 6/13/2024     9:28 AM 8/22/2024     6:53 AM 1/16/2025     9:29 AM   PHQ-9 SCORE   PHQ-9 Total Score MyChart  11 (Moderate depression) 3 (Minimal depression)    PHQ-9 Total Score 12 11 3 1     GAD7:       4/25/2023     5:04 PM 6/13/2024    11:05 AM 1/16/2025     9:29 AM   KONRAD-7 SCORE   Total Score 5 2 3         ASSESSMENT: Current Emotional / Mental Status (status of significant symptoms):   Risk status (Self / Other harm or suicidal ideation)   Patient denies current fears or concerns for personal safety.   Patient denies current or recent suicidal ideation or behaviors.   Patient denies current or recent homicidal ideation or behaviors.   Patient denies current or recent self injurious behavior or ideation.   Patient denies other safety concerns.   Patient reports there has been  no change in risk factors since their last session.     Patient reports there has been no change in protective factors since their last session.     Recommended that patient call 911 or go to the local ED should there be a change in any of these risk factors     Appearance:   Appropriate    Eye Contact:   Good    Psychomotor Behavior: Normal    Attitude:   Cooperative  Interested Attentive   Orientation:   All   Speech    Rate / Production: Normal     Volume:  Normal    Mood:    Normal   Affect:    Appropriate    Thought Content:  Clear    Thought Form:  Coherent  Logical    Insight:    Good      Medication Review:   No current psychiatric medications prescribed     Medication Compliance:   NA     Changes in Health Issues:   None reported     Chemical Use Review:   Substance Use: Chemical use reviewed, no active concerns identified      Tobacco Use: No current tobacco use.      Diagnosis:  F33.1 Major Depressive Disorder, recurrent episode, moderate  F10.91 Alcohol Use Disorder, in remission    Collateral Reports Completed:   Not Applicable    PLAN: (Patient Tasks / Therapist Tasks / Other)  Between now and his next appointment, patient will spend some time being mindful of the progress he has made toward his goals over the past two years, and intentionally acknowledge/celebrate his successes.       Deann Duarte, [facility]                                                         ______________________________________________________________________    Individual Treatment Plan     Patient's Name: Juan Pablo Abraham                 YOB: 1970     Date of Creation: 7/3/2024  Date Treatment Plan Last Reviewed/Revised: 1/16/2025     DSM5 Diagnoses: 296.32 (F33.1) Major Depressive Disorder, Recurrent Episode, Moderate _ or Substance-Related & Addictive Disorders Alcohol Use Disorder   303.90 (F10.20) Moderate In sustained remission  Psychosocial / Contextual Factors: Patient reports that he was referred for  "therapy by his  with the [facility] program, following him completing inpatient treatment at [facility] last year, as well as aftercare services. He reports that he has been sober for 1 year now. Patient notes experiencing some guilt in regard to multiple areas of life, as well as some self-critical thinking patterns, both of which impact his mental health. He reports that he was never able to have biological children of his own, which has been a source of stress for him, and has impacted his mental health as well. Patient reports that he has been  to his wife for 24 years, and notes some occasional stress in their relationship, though a good relationship overall at this time.   PROMIS (reviewed every 90 days): completed on 1/16/2025, with a score of 32     Referral / Collaboration:  Referral to another professional/service is not indicated at this time..     Anticipated number of session for this episode of care:  18-24 sessions  Anticipation frequency of session: Weekly  Anticipated Duration of each session: 38-52 minutes  Treatment plan will be reviewed in 90 days or when goals have been changed.         MeasurableTreatment Goal(s) related to diagnosis / functional impairment(s)  Goal 1: Patient will further develop helpful coping skills to assist him with management of depression and anxiety symptoms, and practice using these skills at least 1-2 times daily, as reported by him, over 3 months' time.   \"I will know I've met my goal when I notice myself having a better outlook on life.\"     Objective #A (Patient Action)                        Patient will identify at least 3-4 worries or thought patterns that contribute to feeling anxious or depressed.  Status: Continued - Date: 1/16/2025     Intervention(s)  Therapist will ask patient to track his symptoms, to identify any potential patterns with thoughts, emotions, triggers, situations/circumstances.     Objective #B  Patient will learn at least 3-4 " "new coping skills for anxiety and depression symptom management, and will practice using these skills at least 1-2 times daily.  Status: Continued - Date: 1/16/2025     Intervention(s)  Therapist will teach and practice mindfulness based and cognitive coping skills in session with patient, and will ask him to practice these skills in between appointments as well.      Goal 2: Patient will be able to effectively manage self-critical and other unhelpful thought patterns, as they occur, at least 1-2 times daily, as reported by him, over 3 months' time.  \"I will know I've met my goal when I feel better about myself, and am not so hard on myself.\"      Objective #A (Patient Action)                          Status: Continued - Date: 1/16/2025     Patient will be able to identify 5-15 characteristics  or strengths that he likes about himself.     Intervention(s)  Therapist will ask patient to identify characteristics or strengths that he or others have noticed throughout his lifetime.      Objective #B  Patient will be able to identify two areas of life that he would like to have improved functioning.               Status: Continued - Date: 1/16/2025   Intervention(s)  Therapist will assist patient in identifying achievable goals for improved functioning, as well as ways in which he might utilize his strengths and abilities to pursue those goals.      Objective #C  Patient will practice the use of self-affirming language, at least 1-2 times daily.  Status: Continued - Date: 1/16/2025      Intervention(s)  Therapist will assist patient in identifying language/phrases that focus on his strengths and abilities, and resonate with him as affirming, will practice using this language in session with him, and will ask him to practice using this language outside of session as well.         Patient has reviewed and agreed to the above plan.        Deann Duarte, [facility]                 January 16, 2025        "

## 2025-07-22 DIAGNOSIS — I10 ESSENTIAL HYPERTENSION: ICD-10-CM

## 2025-07-22 RX ORDER — NIFEDIPINE 30 MG/1
TABLET, EXTENDED RELEASE ORAL
Qty: 90 TABLET | Refills: 0 | Status: SHIPPED | OUTPATIENT
Start: 2025-07-22

## 2025-07-23 ENCOUNTER — PATIENT OUTREACH (OUTPATIENT)
Dept: CARE COORDINATION | Facility: CLINIC | Age: 55
End: 2025-07-23
Payer: COMMERCIAL

## 2025-08-06 ENCOUNTER — PATIENT OUTREACH (OUTPATIENT)
Dept: CARE COORDINATION | Facility: CLINIC | Age: 55
End: 2025-08-06
Payer: COMMERCIAL

## 2025-09-02 SDOH — HEALTH STABILITY: PHYSICAL HEALTH: ON AVERAGE, HOW MANY MINUTES DO YOU ENGAGE IN EXERCISE AT THIS LEVEL?: 10 MIN

## 2025-09-02 SDOH — HEALTH STABILITY: PHYSICAL HEALTH: ON AVERAGE, HOW MANY DAYS PER WEEK DO YOU ENGAGE IN MODERATE TO STRENUOUS EXERCISE (LIKE A BRISK WALK)?: 1 DAY

## 2025-09-02 ASSESSMENT — ASTHMA QUESTIONNAIRES
QUESTION_4 LAST FOUR WEEKS HOW OFTEN HAVE YOU USED YOUR RESCUE INHALER OR NEBULIZER MEDICATION (SUCH AS ALBUTEROL): THREE OR MORE TIMES PER DAY
QUESTION_5 LAST FOUR WEEKS HOW WOULD YOU RATE YOUR ASTHMA CONTROL: SOMEWHAT CONTROLLED
QUESTION_3 LAST FOUR WEEKS HOW OFTEN DID YOUR ASTHMA SYMPTOMS (WHEEZING, COUGHING, SHORTNESS OF BREATH, CHEST TIGHTNESS OR PAIN) WAKE YOU UP AT NIGHT OR EARLIER THAN USUAL IN THE MORNING: NOT AT ALL
QUESTION_1 LAST FOUR WEEKS HOW MUCH OF THE TIME DID YOUR ASTHMA KEEP YOU FROM GETTING AS MUCH DONE AT WORK, SCHOOL OR AT HOME: SOME OF THE TIME
ACT_TOTALSCORE: 14
QUESTION_2 LAST FOUR WEEKS HOW OFTEN HAVE YOU HAD SHORTNESS OF BREATH: ONCE A DAY

## 2025-09-02 ASSESSMENT — PATIENT HEALTH QUESTIONNAIRE - PHQ9
SUM OF ALL RESPONSES TO PHQ QUESTIONS 1-9: 0
SUM OF ALL RESPONSES TO PHQ QUESTIONS 1-9: 0
10. IF YOU CHECKED OFF ANY PROBLEMS, HOW DIFFICULT HAVE THESE PROBLEMS MADE IT FOR YOU TO DO YOUR WORK, TAKE CARE OF THINGS AT HOME, OR GET ALONG WITH OTHER PEOPLE: NOT DIFFICULT AT ALL

## 2025-09-03 ENCOUNTER — OFFICE VISIT (OUTPATIENT)
Dept: FAMILY MEDICINE | Facility: CLINIC | Age: 55
End: 2025-09-03
Attending: FAMILY MEDICINE
Payer: COMMERCIAL

## 2025-09-03 VITALS
TEMPERATURE: 98.1 F | SYSTOLIC BLOOD PRESSURE: 124 MMHG | OXYGEN SATURATION: 98 % | HEART RATE: 79 BPM | BODY MASS INDEX: 32.04 KG/M2 | RESPIRATION RATE: 12 BRPM | DIASTOLIC BLOOD PRESSURE: 84 MMHG | WEIGHT: 257.7 LBS | HEIGHT: 75 IN

## 2025-09-03 DIAGNOSIS — Z12.5 SCREENING FOR PROSTATE CANCER: ICD-10-CM

## 2025-09-03 DIAGNOSIS — E11.9 TYPE 2 DIABETES MELLITUS WITHOUT COMPLICATION, WITHOUT LONG-TERM CURRENT USE OF INSULIN (H): ICD-10-CM

## 2025-09-03 DIAGNOSIS — I10 ESSENTIAL HYPERTENSION: ICD-10-CM

## 2025-09-03 DIAGNOSIS — J45.40 MODERATE PERSISTENT ASTHMA WITHOUT COMPLICATION: ICD-10-CM

## 2025-09-03 DIAGNOSIS — Z00.00 ROUTINE GENERAL MEDICAL EXAMINATION AT A HEALTH CARE FACILITY: Primary | ICD-10-CM

## 2025-09-03 DIAGNOSIS — Z87.19 HISTORY OF ALCOHOLIC HEPATITIS: ICD-10-CM

## 2025-09-03 LAB
ALBUMIN SERPL BCG-MCNC: 4 G/DL (ref 3.5–5.2)
ALP SERPL-CCNC: 105 U/L (ref 40–150)
ALT SERPL W P-5'-P-CCNC: 22 U/L (ref 0–70)
ANION GAP SERPL CALCULATED.3IONS-SCNC: 11 MMOL/L (ref 7–15)
AST SERPL W P-5'-P-CCNC: 18 U/L (ref 0–45)
BILIRUB SERPL-MCNC: 0.5 MG/DL
BUN SERPL-MCNC: 12.2 MG/DL (ref 6–20)
CALCIUM SERPL-MCNC: 9.6 MG/DL (ref 8.8–10.4)
CHLORIDE SERPL-SCNC: 106 MMOL/L (ref 98–107)
CHOLEST SERPL-MCNC: 145 MG/DL
CREAT SERPL-MCNC: 0.91 MG/DL (ref 0.67–1.17)
CREAT UR-MCNC: 225.3 MG/DL
EGFRCR SERPLBLD CKD-EPI 2021: >90 ML/MIN/1.73M2
EST. AVERAGE GLUCOSE BLD GHB EST-MCNC: 146 MG/DL
FASTING STATUS PATIENT QL REPORTED: ABNORMAL
FASTING STATUS PATIENT QL REPORTED: ABNORMAL
GLUCOSE SERPL-MCNC: 160 MG/DL (ref 70–99)
HBA1C MFR BLD: 6.7 % (ref 0–5.6)
HCO3 SERPL-SCNC: 21 MMOL/L (ref 22–29)
HDLC SERPL-MCNC: 36 MG/DL
LDLC SERPL CALC-MCNC: 91 MG/DL
MICROALBUMIN UR-MCNC: <12 MG/L
MICROALBUMIN/CREAT UR: NORMAL MG/G{CREAT}
NONHDLC SERPL-MCNC: 109 MG/DL
POTASSIUM SERPL-SCNC: 4.2 MMOL/L (ref 3.4–5.3)
PROT SERPL-MCNC: 7.9 G/DL (ref 6.4–8.3)
PSA SERPL DL<=0.01 NG/ML-MCNC: 2.31 NG/ML (ref 0–3.5)
SODIUM SERPL-SCNC: 138 MMOL/L (ref 135–145)
TRIGL SERPL-MCNC: 91 MG/DL

## 2025-09-03 PROCEDURE — 1126F AMNT PAIN NOTED NONE PRSNT: CPT | Performed by: FAMILY MEDICINE

## 2025-09-03 PROCEDURE — 80053 COMPREHEN METABOLIC PANEL: CPT | Performed by: FAMILY MEDICINE

## 2025-09-03 PROCEDURE — 36415 COLL VENOUS BLD VENIPUNCTURE: CPT | Performed by: FAMILY MEDICINE

## 2025-09-03 PROCEDURE — 99396 PREV VISIT EST AGE 40-64: CPT | Mod: 25 | Performed by: FAMILY MEDICINE

## 2025-09-03 PROCEDURE — 99214 OFFICE O/P EST MOD 30 MIN: CPT | Mod: 25 | Performed by: FAMILY MEDICINE

## 2025-09-03 PROCEDURE — 3079F DIAST BP 80-89 MM HG: CPT | Performed by: FAMILY MEDICINE

## 2025-09-03 PROCEDURE — 82043 UR ALBUMIN QUANTITATIVE: CPT | Performed by: FAMILY MEDICINE

## 2025-09-03 PROCEDURE — G2211 COMPLEX E/M VISIT ADD ON: HCPCS | Performed by: FAMILY MEDICINE

## 2025-09-03 PROCEDURE — G0103 PSA SCREENING: HCPCS | Performed by: FAMILY MEDICINE

## 2025-09-03 PROCEDURE — 3044F HG A1C LEVEL LT 7.0%: CPT | Performed by: FAMILY MEDICINE

## 2025-09-03 PROCEDURE — 83036 HEMOGLOBIN GLYCOSYLATED A1C: CPT | Performed by: FAMILY MEDICINE

## 2025-09-03 PROCEDURE — 3074F SYST BP LT 130 MM HG: CPT | Performed by: FAMILY MEDICINE

## 2025-09-03 PROCEDURE — 80061 LIPID PANEL: CPT | Performed by: FAMILY MEDICINE

## 2025-09-03 PROCEDURE — 82570 ASSAY OF URINE CREATININE: CPT | Performed by: FAMILY MEDICINE

## 2025-09-03 RX ORDER — ALBUTEROL SULFATE 90 UG/1
2 INHALANT RESPIRATORY (INHALATION) EVERY 4 HOURS PRN
Qty: 8.5 G | Refills: 11 | Status: SHIPPED | OUTPATIENT
Start: 2025-09-03

## 2025-09-03 ASSESSMENT — PAIN SCALES - GENERAL: PAINLEVEL_OUTOF10: NO PAIN (0)

## 2025-09-04 ENCOUNTER — PATIENT OUTREACH (OUTPATIENT)
Dept: CARE COORDINATION | Facility: CLINIC | Age: 55
End: 2025-09-04
Payer: COMMERCIAL

## (undated) DEVICE — ENDO SNARE EXACTO COLD 9MM LOOP 2.4MMX230CM 00711115

## (undated) RX ORDER — LIDOCAINE HYDROCHLORIDE 10 MG/ML
INJECTION, SOLUTION EPIDURAL; INFILTRATION; INTRACAUDAL; PERINEURAL
Status: DISPENSED
Start: 2024-09-23